# Patient Record
Sex: FEMALE | Race: WHITE | NOT HISPANIC OR LATINO | Employment: FULL TIME | URBAN - NONMETROPOLITAN AREA
[De-identification: names, ages, dates, MRNs, and addresses within clinical notes are randomized per-mention and may not be internally consistent; named-entity substitution may affect disease eponyms.]

---

## 2018-07-30 ENCOUNTER — OFFICE VISIT CONVERTED (OUTPATIENT)
Dept: FAMILY MEDICINE CLINIC | Age: 38
End: 2018-07-30
Attending: NURSE PRACTITIONER

## 2018-08-10 ENCOUNTER — OFFICE VISIT CONVERTED (OUTPATIENT)
Dept: FAMILY MEDICINE CLINIC | Age: 38
End: 2018-08-10
Attending: NURSE PRACTITIONER

## 2018-09-26 ENCOUNTER — OFFICE VISIT CONVERTED (OUTPATIENT)
Dept: FAMILY MEDICINE CLINIC | Age: 38
End: 2018-09-26
Attending: FAMILY MEDICINE

## 2018-09-26 ENCOUNTER — OFFICE VISIT CONVERTED (OUTPATIENT)
Dept: OTOLARYNGOLOGY | Facility: CLINIC | Age: 38
End: 2018-09-26
Attending: OTOLARYNGOLOGY

## 2018-09-26 ENCOUNTER — CONVERSION ENCOUNTER (OUTPATIENT)
Dept: OTOLARYNGOLOGY | Facility: CLINIC | Age: 38
End: 2018-09-26

## 2018-10-09 ENCOUNTER — CONVERSION ENCOUNTER (OUTPATIENT)
Dept: OTHER | Facility: HOSPITAL | Age: 38
End: 2018-10-09

## 2019-02-12 ENCOUNTER — HOSPITAL ENCOUNTER (OUTPATIENT)
Dept: OTHER | Facility: HOSPITAL | Age: 39
Discharge: HOME | End: 2019-02-12

## 2019-02-12 ENCOUNTER — OFFICE VISIT CONVERTED (OUTPATIENT)
Dept: FAMILY MEDICINE CLINIC | Age: 39
End: 2019-02-12
Attending: NURSE PRACTITIONER

## 2019-02-12 LAB
ALBUMIN SERPL-MCNC: 4 G/DL (ref 3.5–5)
ALBUMIN/GLOB SERPL: 1.3 {RATIO} (ref 1.4–2.6)
ALP SERPL-CCNC: 64 U/L (ref 42–98)
ALT SERPL-CCNC: 26 U/L (ref 10–40)
ANION GAP SERPL CALC-SCNC: 13 MMOL/L (ref 8–19)
AST SERPL-CCNC: 17 U/L (ref 15–50)
BASOPHILS # BLD MANUAL: 0.05 10*3/UL (ref 0–0.2)
BASOPHILS NFR BLD MANUAL: 0.5 % (ref 0–3)
BILIRUB SERPL-MCNC: 0.27 MG/DL (ref 0.2–1.3)
BUN SERPL-MCNC: 8 MG/DL (ref 5–25)
BUN/CREAT SERPL: 10 {RATIO} (ref 6–20)
CALCIUM SERPL-MCNC: 8.9 MG/DL (ref 8.7–10.4)
CHLORIDE SERPL-SCNC: 105 MMOL/L (ref 99–111)
CHOLEST SERPL-MCNC: 242 MG/DL (ref 107–200)
CHOLEST/HDLC SERPL: 5.9 {RATIO} (ref 3–6)
CONV CO2: 25 MMOL/L (ref 22–32)
CONV TOTAL PROTEIN: 7 G/DL (ref 6.3–8.2)
CREAT UR-MCNC: 0.83 MG/DL (ref 0.5–0.9)
DEPRECATED RDW RBC AUTO: 43.7 FL
EOSINOPHIL # BLD MANUAL: 0.12 10*3/UL (ref 0–0.7)
EOSINOPHIL NFR BLD MANUAL: 1.1 % (ref 0–7)
ERYTHROCYTE [DISTWIDTH] IN BLOOD BY AUTOMATED COUNT: 12.8 % (ref 11.5–14.5)
GFR SERPLBLD BASED ON 1.73 SQ M-ARVRAT: >60 ML/MIN/{1.73_M2}
GLOBULIN UR ELPH-MCNC: 3 G/DL (ref 2–3.5)
GLUCOSE SERPL-MCNC: 87 MG/DL (ref 65–99)
GRANS (ABSOLUTE): 6.78 10*3/UL (ref 2–8)
GRANS: 61.3 % (ref 30–85)
HBA1C MFR BLD: 13.5 G/DL (ref 12–16)
HCT VFR BLD AUTO: 40.5 % (ref 37–47)
HDLC SERPL-MCNC: 41 MG/DL (ref 40–60)
IMM GRANULOCYTES # BLD: 0.02 10*3/UL (ref 0–0.54)
IMM GRANULOCYTES NFR BLD: 0.2 % (ref 0–0.43)
LDLC SERPL CALC-MCNC: 165 MG/DL (ref 70–100)
LYMPHOCYTES # BLD MANUAL: 3.53 10*3/UL (ref 1–5)
LYMPHOCYTES NFR BLD MANUAL: 5 % (ref 3–10)
MCH RBC QN AUTO: 30.6 PG (ref 27–31)
MCHC RBC AUTO-ENTMCNC: 33.3 G/DL (ref 33–37)
MCV RBC AUTO: 91.8 FL (ref 81–99)
MONOCYTES # BLD AUTO: 0.55 10*3/UL (ref 0.2–1.2)
OSMOLALITY SERPL CALC.SUM OF ELEC: 286 MOSM/KG (ref 273–304)
PLATELET # BLD AUTO: 386 10*3/UL (ref 130–400)
PMV BLD AUTO: 8.7 FL (ref 7.4–10.4)
POTASSIUM SERPL-SCNC: 4.2 MMOL/L (ref 3.5–5.3)
RBC # BLD AUTO: 4.41 10*6/UL (ref 4.2–5.4)
SODIUM SERPL-SCNC: 139 MMOL/L (ref 135–147)
TRIGL SERPL-MCNC: 181 MG/DL (ref 40–150)
TSH SERPL-ACNC: 1.62 M[IU]/L (ref 0.27–4.2)
VARIANT LYMPHS NFR BLD MANUAL: 31.9 % (ref 20–45)
VLDLC SERPL-MCNC: 36 MG/DL (ref 5–37)
WBC # BLD AUTO: 11.05 10*3/UL (ref 4.8–10.8)

## 2019-02-13 LAB
ASO AB SERPL-ACNC: 59 [IU]/ML (ref 0–200)
CONV ANTI NUCLEAR ANTIBODY WITH REFLEX: NEGATIVE
CONV RHEUMATOID FACTOR IGM: <10 [IU]/ML (ref 0–14)
CRP SERPL-MCNC: 8.9 MG/L (ref 0–5)
ERYTHROCYTE [SEDIMENTATION RATE] IN BLOOD: 19 MM/H (ref 0–20)
PHOSPHATE SERPL-MCNC: 3.1 MG/DL (ref 2.4–4.5)
URATE SERPL-MCNC: 5.9 MG/DL (ref 2.5–7.5)

## 2019-03-19 ENCOUNTER — HOSPITAL ENCOUNTER (OUTPATIENT)
Dept: OTHER | Facility: HOSPITAL | Age: 39
Discharge: HOME | End: 2019-03-19
Attending: FAMILY MEDICINE

## 2019-03-19 ENCOUNTER — OFFICE VISIT CONVERTED (OUTPATIENT)
Dept: FAMILY MEDICINE CLINIC | Age: 39
End: 2019-03-19
Attending: FAMILY MEDICINE

## 2019-03-21 LAB — BACTERIA SPEC AEROBE CULT: NORMAL

## 2019-08-01 ENCOUNTER — OFFICE VISIT CONVERTED (OUTPATIENT)
Dept: FAMILY MEDICINE CLINIC | Age: 39
End: 2019-08-01
Attending: NURSE PRACTITIONER

## 2019-08-01 ENCOUNTER — HOSPITAL ENCOUNTER (OUTPATIENT)
Dept: OTHER | Facility: HOSPITAL | Age: 39
Discharge: HOME | End: 2019-08-01

## 2019-08-15 ENCOUNTER — HOSPITAL ENCOUNTER (OUTPATIENT)
Dept: OTHER | Facility: HOSPITAL | Age: 39
Discharge: HOME | End: 2019-08-15

## 2019-08-15 ENCOUNTER — OFFICE VISIT CONVERTED (OUTPATIENT)
Dept: FAMILY MEDICINE CLINIC | Age: 39
End: 2019-08-15
Attending: NURSE PRACTITIONER

## 2019-08-15 LAB
ALBUMIN SERPL-MCNC: 4.3 G/DL (ref 3.5–5)
ALBUMIN/GLOB SERPL: 1.4 {RATIO} (ref 1.4–2.6)
ALP SERPL-CCNC: 72 U/L (ref 42–98)
ALT SERPL-CCNC: 45 U/L (ref 10–40)
ANION GAP SERPL CALC-SCNC: 18 MMOL/L (ref 8–19)
AST SERPL-CCNC: 24 U/L (ref 15–50)
BASOPHILS # BLD AUTO: 0.07 10*3/UL (ref 0–0.2)
BASOPHILS # BLD: 0 % (ref 0–3)
BASOPHILS NFR BLD AUTO: 0.7 % (ref 0–3)
BILIRUB SERPL-MCNC: 0.33 MG/DL (ref 0.2–1.3)
BUN SERPL-MCNC: 12 MG/DL (ref 5–25)
BUN/CREAT SERPL: 16 {RATIO} (ref 6–20)
CALCIUM SERPL-MCNC: 9.2 MG/DL (ref 8.7–10.4)
CHLORIDE SERPL-SCNC: 105 MMOL/L (ref 99–111)
CHOLEST SERPL-MCNC: 256 MG/DL (ref 107–200)
CHOLEST/HDLC SERPL: 6.2 {RATIO} (ref 3–6)
CONV ABS BANDS: 0 % (ref 1–5)
CONV ABS IMM GRAN: 0.03 10*3/UL (ref 0–0.2)
CONV ATYPICAL LYMPHOCYTES: 19 % (ref 0–5)
CONV CO2: 20 MMOL/L (ref 22–32)
CONV IMMATURE GRAN: 0.3 % (ref 0–1.8)
CONV SEGMENTED NEUTROPHILS: 53 % (ref 45–70)
CONV TOTAL PROTEIN: 7.3 G/DL (ref 6.3–8.2)
CREAT UR-MCNC: 0.75 MG/DL (ref 0.5–0.9)
DEPRECATED RDW RBC AUTO: 44.3 FL (ref 36.4–46.3)
EOSINOPHIL # BLD AUTO: 0.13 10*3/UL (ref 0–0.7)
EOSINOPHIL # BLD AUTO: 1.2 % (ref 0–7)
EOSINOPHIL NFR BLD AUTO: 1 % (ref 0–7)
ERYTHROCYTE [DISTWIDTH] IN BLOOD BY AUTOMATED COUNT: 13 % (ref 11.7–14.4)
GFR SERPLBLD BASED ON 1.73 SQ M-ARVRAT: >60 ML/MIN/{1.73_M2}
GLOBULIN UR ELPH-MCNC: 3 G/DL (ref 2–3.5)
GLUCOSE SERPL-MCNC: 90 MG/DL (ref 65–99)
HCT VFR BLD AUTO: 39 % (ref 37–47)
HDLC SERPL-MCNC: 41 MG/DL (ref 40–60)
HGB BLD-MCNC: 13.1 G/DL (ref 12–16)
LDLC SERPL CALC-MCNC: 177 MG/DL (ref 70–100)
LYMPHOCYTES # BLD AUTO: 4.07 10*3/UL (ref 1–5)
LYMPHOCYTES NFR BLD AUTO: 37.9 % (ref 20–45)
MCH RBC QN AUTO: 31.2 PG (ref 27–31)
MCHC RBC AUTO-ENTMCNC: 33.6 G/DL (ref 33–37)
MCV RBC AUTO: 92.9 FL (ref 81–99)
MONOCYTES # BLD AUTO: 0.58 10*3/UL (ref 0.2–1.2)
MONOCYTES NFR BLD AUTO: 5.4 % (ref 3–10)
MONOCYTES NFR BLD MANUAL: 7 % (ref 3–10)
NEUTROPHILS # BLD AUTO: 5.86 10*3/UL (ref 2–8)
NEUTROPHILS NFR BLD AUTO: 54.5 % (ref 30–85)
NRBC CBCN: 0 % (ref 0–0.7)
NUC CELL # PRT MANUAL: 0 /100{WBCS}
OSMOLALITY SERPL CALC.SUM OF ELEC: 287 MOSM/KG (ref 273–304)
PLAT MORPH BLD: NORMAL
PLATELET # BLD AUTO: 364 10*3/UL (ref 130–400)
PMV BLD AUTO: 9.2 FL (ref 9.4–12.3)
POTASSIUM SERPL-SCNC: 4 MMOL/L (ref 3.5–5.3)
RBC # BLD AUTO: 4.2 10*6/UL (ref 4.2–5.4)
SMALL PLATELETS BLD QL SMEAR: ADEQUATE
SODIUM SERPL-SCNC: 139 MMOL/L (ref 135–147)
TRIGL SERPL-MCNC: 192 MG/DL (ref 40–150)
TSH SERPL-ACNC: 1.37 M[IU]/L (ref 0.27–4.2)
VARIANT LYMPHS NFR BLD MANUAL: 20 % (ref 20–45)
VLDLC SERPL-MCNC: 38 MG/DL (ref 5–37)
WBC # BLD AUTO: 10.74 10*3/UL (ref 4.8–10.8)

## 2019-12-02 ENCOUNTER — HOSPITAL ENCOUNTER (OUTPATIENT)
Dept: OTHER | Facility: HOSPITAL | Age: 39
Discharge: HOME | End: 2019-12-02
Attending: FAMILY MEDICINE

## 2019-12-02 ENCOUNTER — OFFICE VISIT CONVERTED (OUTPATIENT)
Dept: FAMILY MEDICINE CLINIC | Age: 39
End: 2019-12-02
Attending: FAMILY MEDICINE

## 2019-12-04 LAB — BACTERIA SPEC AEROBE CULT: NORMAL

## 2019-12-17 ENCOUNTER — OFFICE VISIT CONVERTED (OUTPATIENT)
Dept: FAMILY MEDICINE CLINIC | Age: 39
End: 2019-12-17
Attending: NURSE PRACTITIONER

## 2020-01-02 ENCOUNTER — HOSPITAL ENCOUNTER (OUTPATIENT)
Dept: OTHER | Facility: HOSPITAL | Age: 40
Discharge: HOME | End: 2020-01-02
Attending: FAMILY MEDICINE

## 2020-01-02 LAB
ALT SERPL-CCNC: 36 U/L (ref 10–40)
CHOLEST SERPL-MCNC: 265 MG/DL (ref 107–200)
CHOLEST/HDLC SERPL: 5.8 {RATIO} (ref 3–6)
HDLC SERPL-MCNC: 46 MG/DL (ref 40–60)
LDLC SERPL CALC-MCNC: 177 MG/DL (ref 70–100)
TRIGL SERPL-MCNC: 208 MG/DL (ref 40–150)
VLDLC SERPL-MCNC: 42 MG/DL (ref 5–37)

## 2020-01-03 LAB — TSH SERPL-ACNC: 2.67 M[IU]/L (ref 0.27–4.2)

## 2020-01-15 ENCOUNTER — HOSPITAL ENCOUNTER (OUTPATIENT)
Dept: OTHER | Facility: HOSPITAL | Age: 40
Discharge: HOME | End: 2020-01-15
Attending: NURSE PRACTITIONER

## 2020-01-20 ENCOUNTER — HOSPITAL ENCOUNTER (OUTPATIENT)
Dept: OTHER | Facility: HOSPITAL | Age: 40
Discharge: HOME | End: 2020-01-20
Attending: NURSE PRACTITIONER

## 2020-03-09 ENCOUNTER — HOSPITAL ENCOUNTER (OUTPATIENT)
Dept: OTHER | Facility: HOSPITAL | Age: 40
Discharge: HOME | End: 2020-03-09
Attending: NURSE PRACTITIONER

## 2020-03-09 ENCOUNTER — OFFICE VISIT CONVERTED (OUTPATIENT)
Dept: FAMILY MEDICINE CLINIC | Age: 40
End: 2020-03-09
Attending: NURSE PRACTITIONER

## 2020-03-11 LAB — BACTERIA SPEC AEROBE CULT: NORMAL

## 2020-03-31 ENCOUNTER — OFFICE VISIT CONVERTED (OUTPATIENT)
Dept: FAMILY MEDICINE CLINIC | Age: 40
End: 2020-03-31
Attending: NURSE PRACTITIONER

## 2020-07-20 ENCOUNTER — HOSPITAL ENCOUNTER (OUTPATIENT)
Dept: OTHER | Facility: HOSPITAL | Age: 40
Discharge: HOME | End: 2020-07-20
Attending: NURSE PRACTITIONER

## 2020-07-21 LAB
CHOLEST SERPL-MCNC: 164 MG/DL (ref 107–200)
CHOLEST/HDLC SERPL: 5 {RATIO} (ref 3–6)
HDLC SERPL-MCNC: 33 MG/DL (ref 40–60)
LDLC SERPL CALC-MCNC: 105 MG/DL (ref 70–100)
TRIGL SERPL-MCNC: 129 MG/DL (ref 40–150)
VLDLC SERPL-MCNC: 26 MG/DL (ref 5–37)

## 2020-08-12 ENCOUNTER — HOSPITAL ENCOUNTER (OUTPATIENT)
Dept: OTHER | Facility: HOSPITAL | Age: 40
Discharge: HOME | End: 2020-08-12
Attending: NURSE PRACTITIONER

## 2020-08-12 ENCOUNTER — OFFICE VISIT CONVERTED (OUTPATIENT)
Dept: FAMILY MEDICINE CLINIC | Age: 40
End: 2020-08-12
Attending: NURSE PRACTITIONER

## 2020-08-13 ENCOUNTER — HOSPITAL ENCOUNTER (OUTPATIENT)
Dept: OTHER | Facility: HOSPITAL | Age: 40
Discharge: HOME | End: 2020-08-13
Attending: NURSE PRACTITIONER

## 2020-08-14 LAB
BACTERIA SPEC AEROBE CULT: NORMAL
SARS-COV-2 RNA SPEC QL NAA+PROBE: NOT DETECTED

## 2020-10-14 ENCOUNTER — OFFICE VISIT CONVERTED (OUTPATIENT)
Dept: FAMILY MEDICINE CLINIC | Age: 40
End: 2020-10-14
Attending: NURSE PRACTITIONER

## 2020-10-19 ENCOUNTER — OFFICE VISIT CONVERTED (OUTPATIENT)
Dept: FAMILY MEDICINE CLINIC | Age: 40
End: 2020-10-19
Attending: NURSE PRACTITIONER

## 2020-10-28 ENCOUNTER — HOSPITAL ENCOUNTER (OUTPATIENT)
Dept: OTHER | Facility: HOSPITAL | Age: 40
Discharge: HOME | End: 2020-10-28
Attending: NURSE PRACTITIONER

## 2020-10-28 ENCOUNTER — OFFICE VISIT CONVERTED (OUTPATIENT)
Dept: FAMILY MEDICINE CLINIC | Age: 40
End: 2020-10-28
Attending: NURSE PRACTITIONER

## 2020-10-28 LAB
25(OH)D3 SERPL-MCNC: 18.9 NG/ML (ref 30–100)
ALBUMIN SERPL-MCNC: 4.3 G/DL (ref 3.5–5)
ALBUMIN/GLOB SERPL: 1.3 {RATIO} (ref 1.4–2.6)
ALP SERPL-CCNC: 82 U/L (ref 42–98)
ALT SERPL-CCNC: 18 U/L (ref 10–40)
ANION GAP SERPL CALC-SCNC: 16 MMOL/L (ref 8–19)
AST SERPL-CCNC: 12 U/L (ref 15–50)
BILIRUB SERPL-MCNC: 0.29 MG/DL (ref 0.2–1.3)
BUN SERPL-MCNC: 14 MG/DL (ref 5–25)
BUN/CREAT SERPL: 15 {RATIO} (ref 6–20)
CALCIUM SERPL-MCNC: 9.8 MG/DL (ref 8.7–10.4)
CHLORIDE SERPL-SCNC: 102 MMOL/L (ref 99–111)
CHOLEST SERPL-MCNC: 163 MG/DL (ref 107–200)
CHOLEST/HDLC SERPL: 3.8 {RATIO} (ref 3–6)
CONV CO2: 24 MMOL/L (ref 22–32)
CONV TOTAL PROTEIN: 7.6 G/DL (ref 6.3–8.2)
CREAT UR-MCNC: 0.91 MG/DL (ref 0.5–0.9)
ERYTHROCYTE [DISTWIDTH] IN BLOOD BY AUTOMATED COUNT: 13.5 % (ref 11.7–14.4)
GFR SERPLBLD BASED ON 1.73 SQ M-ARVRAT: >60 ML/MIN/{1.73_M2}
GLOBULIN UR ELPH-MCNC: 3.3 G/DL (ref 2–3.5)
GLUCOSE SERPL-MCNC: 103 MG/DL (ref 65–99)
HCT VFR BLD AUTO: 43.2 % (ref 37–47)
HDLC SERPL-MCNC: 43 MG/DL (ref 40–60)
HGB BLD-MCNC: 14.3 G/DL (ref 12–16)
LDLC SERPL CALC-MCNC: 95 MG/DL (ref 70–100)
MCH RBC QN AUTO: 30.9 PG (ref 27–31)
MCHC RBC AUTO-ENTMCNC: 33.1 G/DL (ref 33–37)
MCV RBC AUTO: 93.3 FL (ref 81–99)
OSMOLALITY SERPL CALC.SUM OF ELEC: 285 MOSM/KG (ref 273–304)
PLATELET # BLD AUTO: 394 10*3/UL (ref 130–400)
PMV BLD AUTO: 9.1 FL (ref 9.4–12.3)
POTASSIUM SERPL-SCNC: 5 MMOL/L (ref 3.5–5.3)
RBC # BLD AUTO: 4.63 10*6/UL (ref 4.2–5.4)
SODIUM SERPL-SCNC: 137 MMOL/L (ref 135–147)
TRIGL SERPL-MCNC: 127 MG/DL (ref 40–150)
TSH SERPL-ACNC: 1.33 M[IU]/L (ref 0.27–4.2)
VLDLC SERPL-MCNC: 25 MG/DL (ref 5–37)
WBC # BLD AUTO: 10.43 10*3/UL (ref 4.8–10.8)

## 2020-11-25 ENCOUNTER — HOSPITAL ENCOUNTER (OUTPATIENT)
Dept: OTHER | Facility: HOSPITAL | Age: 40
Discharge: HOME | End: 2020-11-25
Attending: NURSE PRACTITIONER

## 2020-12-08 ENCOUNTER — HOSPITAL ENCOUNTER (OUTPATIENT)
Dept: PHYSICAL THERAPY | Facility: CLINIC | Age: 40
Setting detail: RECURRING SERIES
Discharge: HOME | End: 2021-01-07
Attending: NURSE PRACTITIONER

## 2020-12-09 ENCOUNTER — OFFICE VISIT CONVERTED (OUTPATIENT)
Dept: FAMILY MEDICINE CLINIC | Age: 40
End: 2020-12-09
Attending: NURSE PRACTITIONER

## 2021-01-04 ENCOUNTER — OFFICE VISIT CONVERTED (OUTPATIENT)
Dept: FAMILY MEDICINE CLINIC | Age: 41
End: 2021-01-04
Attending: NURSE PRACTITIONER

## 2021-01-05 ENCOUNTER — OFFICE VISIT CONVERTED (OUTPATIENT)
Dept: FAMILY MEDICINE CLINIC | Age: 41
End: 2021-01-05
Attending: NURSE PRACTITIONER

## 2021-01-30 ENCOUNTER — HOSPITAL ENCOUNTER (OUTPATIENT)
Dept: OTHER | Facility: HOSPITAL | Age: 41
Discharge: HOME | End: 2021-01-30
Attending: FAMILY MEDICINE

## 2021-02-03 LAB — SARS-COV-2 RNA SPEC QL NAA+PROBE: NOT DETECTED

## 2021-02-17 ENCOUNTER — OFFICE VISIT CONVERTED (OUTPATIENT)
Dept: FAMILY MEDICINE CLINIC | Age: 41
End: 2021-02-17
Attending: NURSE PRACTITIONER

## 2021-02-18 ENCOUNTER — OFFICE VISIT CONVERTED (OUTPATIENT)
Dept: FAMILY MEDICINE CLINIC | Age: 41
End: 2021-02-18
Attending: FAMILY MEDICINE

## 2021-03-29 ENCOUNTER — OFFICE VISIT CONVERTED (OUTPATIENT)
Dept: CARDIOLOGY | Facility: CLINIC | Age: 41
End: 2021-03-29
Attending: INTERNAL MEDICINE

## 2021-04-14 ENCOUNTER — CONVERSION ENCOUNTER (OUTPATIENT)
Dept: CARDIOLOGY | Facility: CLINIC | Age: 41
End: 2021-04-14
Attending: INTERNAL MEDICINE

## 2021-04-21 ENCOUNTER — OFFICE VISIT CONVERTED (OUTPATIENT)
Dept: FAMILY MEDICINE CLINIC | Age: 41
End: 2021-04-21
Attending: NURSE PRACTITIONER

## 2021-05-10 NOTE — H&P
History and Physical      Patient Name: Aurora Mendez   Patient ID: [id number]   Sex: Female   YOB: 1980    Primary Care Provider: Gabbie ARCEO   Referring Provider: Gabbie ARCEO    Visit Date: March 29, 2021    Provider: Nader Duran MD   Location: [facility]   Location Address: [address] [place] [address], [address]   [place]  [id number]   Location Phone: [phone]          Chief Complaint     Chest pain and shortness of breath.       History Of Present Illness  Consult requested by: Gabbie ARCEO   Aurora Mendez is a 40 year old /White female with morbid obesity, polycystic ovarian disease, and manic depressive disorder. She was referred for cardiac evaluation because of chest pain and shortness of breath. The symptoms are present for the past several months. It is described as a sharp and intermittent pain on the left side of the chest, which is present both at rest and sometimes on activities. She also has shortness of breath on activities along with heaviness. The shortness of breath also happens while she is lying down in the bed at night. She denies any palpitations. No dizziness. No syncopal episodes. No pedal edema. She had a visit to the emergency room on 02/18/2021 for chest pain. Acute coronary syndrome was ruled out and she was discharged home. She has no history of any previous cardiac illness and denies any recent cardiac testing as well.   PAST MEDICAL HISTORY: 1) Manic depressive disorder. 2) Polycystic ovarian disease. 3) Hyperlipidemia. 4) Hypothyroidism. 5) Negative for diabetes mellitus or coronary artery disease.   PSYCHOSOCIAL HISTORY: Currently smoker who smokes 1/2 pack of cigarettes per day. She drinks alcohol rarely. No recreational drug usage. She reports some mood changes and depression.   FAMILY HISTORY: Positive for diabetes and hypertension. No family history of premature coronary artery disease.   CURRENT MEDICATIONS:  "Metformin 400 mg b.i.d.; buspirone 10 mg b.i.d.; Wellbutrin 450 mg daily; atorvastatin 40 mg daily; Synthroid 125 mcg daily; Lamictal 100 mg daily; norethindrone 35 mg daily; Seroquel 25 mg p.r.n.; vitamin D 1000 units daily.   ALLERGIES: No known drug allergies.       Review of Systems  · Constitutional  o Admits  o : fatigue  o Denies  o : recent weight changes   · Eyes  o Admits  o : double vision  · HENT  o Denies  o : hearing loss or ringing, chronic sinus problem, swollen glands in neck  · Cardiovascular  o Admits  o : chest pain, palpitations (fast, fluttering, or skipping beats), swelling (feet, ankles, hands), shortness of breath while walking or lying flat  · Respiratory  o Denies  o : chronic or frequent cough, asthma or wheezing, COPD  · Gastrointestinal  o Denies  o : ulcers, nausea or vomiting  · Neurologic  o Admits  o : lightheaded or dizzy, headaches  o Denies  o : stroke  · Musculoskeletal  o Admits  o : joint pain, back pain  · Endocrine  o Admits  o : excessive thirst or urination  o Denies  o : thyroid disease, diabetes, heat or cold intolerance  · Heme-Lymph  o Denies  o : bleeding or bruising tendency, anemia      Vitals  Date Time BP Position Site L\R Cuff Size HR RR TEMP (F) WT  HT  BMI kg/m2 BSA m2 O2 Sat FR L/min FiO2 HC       03/29/2021 08:33 /82 Sitting    82 - R   259lbs 0oz 5'  7\" 40.56 2.36             Physical Examination  · Constitutional  o Appearance  o : Awake, alert, in no acute distress.  · Head and Face  o HEENT  o : No pallor, anicteric. Eyes normal. Moist mucous membranes.  · Neck  o Inspection/Palpation  o : Supple.   o Jugular Veins  o : No JVD. No carotid bruits.  · Respiratory  o Auscultation of Lungs  o : Clear to auscultation bilaterally. No crackles or wheezing.  · Cardiovascular  o Heart  o : S1, S2 is normally heard. No S3. No murmur, rubs, or gallops.  · Gastrointestinal  o Abdominal Examination  o : Soft, non-distended. No palpable hepatosplenomegaly. Bowel " sounds heard in all four quadrants.  · Musculoskeletal  o General  o : Normal muscle tone and strength.  · Skin and Subcutaneous Tissue  o General Inspection  o : No skin rashes.  · Extremities  o Extremities  o : Warm and well perfused. Distal pulses present. No pitting pedal edema.  · EKG  o EKG  o : EKG done at [facility] emergency room on 02/18/2021 showed sinus rhythm, borderline right axis deviation, nonspecific T-wave changes in multiple leads, abnormal EKG.   · Labs  o Labs  o : Done on 02/18/2021: Sodium 135, potassium 3.8, chloride 100, BUN 12, creatinine 0.74, magnesium 1.78, AST 16, ALT 26, total CK 89, total protein 6.9. On 10/28/2020 triglycerides were 127, total cholesterol 163, LDL 95, HDL 43.           Assessment     ASSESSMENT & PLAN:    1.  Chest pain/shortness of breath.  Symptoms have both typical and atypical features for angina.  She has        multiple risk factors for coronary artery disease including hyperlipidemia and tobacco use.  EKG showed        nonsignificant T-wave abnormalities in multiple leads.  Ischemia needs to be ruled out.  We will proceed        with an echocardiogram to assess the LV systolic and diastolic function and to rule out significant valvular        abnormalities.  The patient will also need a SPECT stress test to rule out reversible ischemia.  SPECT is        needed since baseline EKG is abnormal.  Moreover, the patient is unable to exercise in a satisfactory        manner to reach the goal heart rate.   2.  Follow up with the echocardiogram and SPECT reports.     Nader Duran MD   JV/rt                Electronically Signed by: Jerrica Montero-, Other -Author on April 10, 2021 09:35:22 AM  Electronically Co-signed by: Nader Duran MD -Reviewer on April 11, 2021 06:09:56 PM

## 2021-05-11 NOTE — PROCEDURES
"   Procedure Note      Patient Name: Aurora Mendez   Patient ID: [id number]   Sex: Female   YOB: 1980    Primary Care Provider: Gabbie ARCEO   Referring Provider: Gabbie ARCEO    Visit Date: April 14, 2021    Provider: Nader Duran MD   Location[facility]   Location Address: [address] [place] [address]  [address]  [place], [place]  [id number]   Location Phone: [phone]          FINAL REPORT   TRANSTHORACIC ECHOCARDIOGRAM REPORT    Diagnosis: Chest pain, shortness of breath   Height: 5'7\" Weight: 259 B/P: 138/82 BSA: 2.26   Tech: JLW   MEASUREMENTS:  RVID (Diastole) : RVID. (NORMAL: 0.7 to 2.4 cm max)   LVID (Systole): 2.7 cm (Diastole): 4.1 cm . (NORMAL: 3.7 - 5.4 cm)   Posterior Wall Thickness (Diastole): 1 cm. (NORMAL: 0.8 - 1.1 cm)   Septal Thickness (Diastole): 1 cm. (NORMAL: 0.7 - 1.2 cm)   LAID (Systole): 3.1 cm. (NORMAL: 1.9 - 3.8 cm)   Aortic Root Diameter (Diastole): 3.2 cm. (NORMAL: 2.0 - 3.7 cm)   COMMENTS:  The patient underwent 2-D, M-Mode, and Doppler examination, including pulse-wave, continuous-wave, and color-flow analysis; the study is technically adequate.   FINDINGS:  MITRAL VALVE: Normal in appearance, opens well. No evidence of mitral valve prolapse. No mitral stenosis. Trace mitral regurgitation.   AORTIC VALVE: Normal trileaflet aortic valve, opens well. No evidence of aortic stenosis or regurgitation on Doppler examination.   TRICUSPID VALVE: Normal in appearance, opens well. Trace tricuspid regurgitation. Normal pulmonary artery systolic pressure.   PULMONIC VALVE: Grossly normal.   AORTIC ROOT: Normal in size with adequate motion.   LEFT ATRIUM: Normal in size. No intracavitary masses or clots seen. LA volume index is 28 mL/m2.   LEFT VENTRICLE: The left ventricular chamber size is normal. The left ventricular wall thickness is normal. Left ventricular systolic function is normal. Estimated LV ejection fraction is 55-60%. There are no " regional wall motion abnormalities.   RIGHT VENTRICLE: Normal size and function.   RIGHT ATRIUM: Normal in size.   PERICARDIUM: No evidence of pericardial effusion.   INFERIOR VENA CAVA: Measures 1.3 cm in diameter and there is more than 50% collapse during inspiration.   DOPPLER: E/A ratio is 1.4. DT= 202 msec. IVRT is 90 msec. E/E' is 11.   Faxed: 04/20/2021      CONCLUSION:  1.  Normal left ventricular systolic function with estimated LV ejection fraction of 55-60%.  2.  No hemodynamically significant valvular abnormalities.      Nader Duran MD  JV/pap                 Electronically Signed by: Abimbola Fisher-, Other -Author on April 20, 2021 03:35:31 PM  Electronically Co-signed by: Nader Duran MD -Reviewer on April 20, 2021 03:36:07 PM

## 2021-05-14 VITALS
HEIGHT: 67 IN | BODY MASS INDEX: 40.65 KG/M2 | HEART RATE: 82 BPM | WEIGHT: 259 LBS | DIASTOLIC BLOOD PRESSURE: 82 MMHG | SYSTOLIC BLOOD PRESSURE: 138 MMHG

## 2021-05-16 VITALS
OXYGEN SATURATION: 98 % | WEIGHT: 250 LBS | HEART RATE: 90 BPM | RESPIRATION RATE: 20 BRPM | BODY MASS INDEX: 39.24 KG/M2 | TEMPERATURE: 98.4 F | DIASTOLIC BLOOD PRESSURE: 78 MMHG | SYSTOLIC BLOOD PRESSURE: 131 MMHG | HEIGHT: 67 IN

## 2021-05-18 NOTE — PROGRESS NOTES
Aurora Mendez  1980     Office/Outpatient Visit    Visit Date: Tue, Jan 5, 2021 09:42 am    Provider: Gabbie Barnard N.P. (Assistant: Veena Osborne MA)    Location: [facility]        Electronically signed by Gabbie Barnard N.P. on  01/05/2021 10:02:02 AM                             Subjective:        CC: Ms. Mendez is a 40 year old White female.  note for work/fmla; pt states she hasnt started new dosage of metformin [phone] phone call         HPI:           Complaint of adjustment disorder with depressed mood..  The symptom began years ago.  Aurora is following up today due to her inability to return to work as planned from her FMLA. She reports that she did not get any sleep last night due to her anxiety and worry over her return to work She is planning on intermittent FMLA in the future should she need to have time off related to her appt/ anxiety but she feels like she is at the point now that she needs to remain continuous for 2 more weeks until we resolve her current sleep problems           Complaint of psychophysiologic insomnia..  Aurora was unable to sleep last night . She has been up since 6am yesterday and feels like it is due to her anxeity over returning to work She is not taking any medication at this point to help with her sleep nor has she in the past     ROS:     CONSTITUTIONAL:  Negative for chills, fatigue and fever.      CARDIOVASCULAR:  Negative for chest pain and pedal edema.      RESPIRATORY:  Negative for recent cough and dyspnea.      PSYCHIATRIC:  Positive for anxiety, crying spells, depression, feelings of stress, anhedonia, mood swings, difficulty concentrating, sadness, sleep disturbance and suicidal thoughts ( fleeting thoughts, no plan ).          Past Medical History / Family History / Social History:         Last Reviewed on 12/22/2019 10:57 PM by Gabbie Barnard    Past Medical History:             PREVENTIVE HEALTH MAINTENANCE              DENTAL CLEANING: was last done - [place]     EYE EXAM: was last done been some time     MAMMOGRAM: Done within last 2 years and results in are chart was last done 2020 with normal results     PAP SMEAR: hysterectomy         Surgical History:         Hysterectomy: stage III dysplasia;         Family History:         Positive for Coronary Artery Disease ( pat. GM ) and Myocardial Infarction ( mat. GF ).      Positive for Type 2 Diabetes ( pat. GM ).  Father:  at age 63; Cause of death was pancreatic cacner     Mother: Healthy     Son(s): Healthy; 2 son(s) total     Daughter(s): 1 daughter(s) total; 1 ;  pneumonia/ at 17         Social History:     Occupation: [employer] supervisor     Marital Status:      Children: 3 children         Tobacco/Alcohol/Supplements:     Last Reviewed on 2021 08:47 AM by Tori Pickard    Tobacco: Current Smoker: She currently smokes some days, 1/2 pack per day.      Caffeine:  She admits to consuming caffeine via coffee ( 2 servings per day ) and tea ( 2 servings per day ).          Substance Abuse History:     Last Reviewed on 2019 10:57 PM by Gabbie Barnard    NEGATIVE         Mental Health History:     Last Reviewed on 2019 10:57 PM by Gabbie Barnard        Generalized Anxiety Disorder    Posttraumatic Stress Disorder ( from loss of her daughter in  )     Major Depression         Communicable Diseases (eg STDs):     Last Reviewed on 2019 10:57 PM by Gabibe Barnard        Current Problems:     Last Reviewed on 2020 09:47 PM by Gabbie Barnard    Hypothyroidism, unspecified    Dysthymic disorder    Vitamin D deficiency, unspecified    Polycystic ovarian syndrome    Pure hyperglyceridemia    Anogenital (venereal) warts    Mixed hyperlipidemia    Nicotine dependence, unspecified, uncomplicated    Major depressive disorder, recurrent severe without psychotic features    Generalized anxiety disorder    Strain of  unspecified muscle, fascia and tendon at shoulder and upper arm level, left arm, initial encounter    Pain in right knee    Pain in left knee    Adjustment disorder with depressed mood        Immunizations:     Fluzone Quadrivalent (3+ years) 10/30/2017    PPD 1/30/2007        Allergies:     Last Reviewed on 1/05/2021 09:44 AM by Veena Osborne    Effexor: palpitations     Prozac:          Current Medications:     Last Reviewed on 1/05/2021 09:44 AM by Veena Osborne    TylenoL 325 mg oral tablet [take 1 - 2 tablets (325 - 650 mg) by oral route every 4-6 hours as needed]    buPROPion  mg oral Tablet, Extended Release 24 hr [take 1 tablet (450 mg) by oral route once daily swallowing whole. Do not crush, chew and/or divide.]    lamoTRIgine 100 mg oral tablet [take 1 tablet (100 mg) by oral route at night ]    Synthroid 125 mcg oral tablet [1 tab daily]    norethindrone (contraceptive) 0.35 mg oral tablet [Take 1 tablet(s) by mouth daily as directed.]    atorvastatin 40 mg oral tablet [take 1 tablet (40 mg) by oral route once daily @ HS]    busPIRone 10 mg oral tablet    metFORMIN 500 mg oral Tablet, Extended Release 24 hr [take 1 tablet (500 mg) by oral route twice daily]        Assessment:         F43.21   Adjustment disorder with depressed mood       F41.1   Generalized anxiety disorder       F51.04   Psychophysiologic insomnia           Plan:         Adjustment disorder with depressed mood        RECOMMENDATIONS given include: we will extend her FMLA through 2 more weeks.  She will follderic tavarez in office here next week after her [facility] visits.  Telehealth: Verbal consent obtained for visit to occur via phone call; Staff, other than provider, present during telephone visit include only patient and provider; Total time spent was 7 minutes; [id number]--Wswwxlbou E/M 5-10 minutes         Generalized anxiety disorder        RECOMMENDATIONS given include: follow up with Mohini as recommended on Friday.           Psychophysiologic insomnia        RECOMMENDATIONS given include: I have recommended that Aurora try OTC melatonin for now  start with 4mg  add 2mg after 1 hour if no improvement - Next night, start with 6 mg  up to 10mg max of OTC.  She will discuss this further with Mohini on Friday for insomnia intervention.              Patient Recommendations:        For  Adjustment disorder with depressed mood:    I also recommend we will extend her FMLA through 2 more weeks.  She will follderic tavarez in office here next week after her [employer] visits.          For  Generalized anxiety disorder:    I also recommend follow up with Mohini as recommended on Friday.          For  Psychophysiologic insomnia:    I also recommend I have recommended that Aurora try OTC melatonin for now  start with 4mg  add 2mg after 1 hour if no improvement - Next night, start with 6 mg  up to 10mg max of OTC.  She will discuss this further with Mohini on Friday for insomnia intervention.              Charge Capture:         Primary Diagnosis:     F43.21  Adjustment disorder with depressed mood           Orders:      [id number]  Phys/QHP telephone evaluation 5-10 min  (In-House)              F41.1  Generalized anxiety disorder     F51.04  Psychophysiologic insomnia

## 2021-05-18 NOTE — PROGRESS NOTES
Aurora MendezSue 1980     Office/Outpatient Visit    Visit Date:  01:46 pm    Provider: Gabbie Barnard N.P. (Assistant: Sabra Messina)    Location: [facility]        Electronically signed by Gabbie Barnard N.P. on  2018 11:38:52 AM                             SUBJECTIVE:        CC:     Ms. Mendez is a 37 year old White female.  presents today due to Nausea, sore throat         HPI:         With regard to the upper respiratory illness, these have been present for the past 3 days.  The symptoms include sore throat nausea without vomiting.  She denies fever, headache, nasal congestion or sputum production.  She denies exposure to ill contacts.  She has already tried to relieve the symptoms with nyquil.      ROS:     CONSTITUTIONAL:  Negative for chills, fatigue, fever, and weight change.      E/N/T:  Positive for nasal congestion and sore throat.      CARDIOVASCULAR:  Negative for chest pain, orthopnea, paroxysmal nocturnal dyspnea and pedal edema.      RESPIRATORY:  Negative for dyspnea.      GASTROINTESTINAL:  Positive for nausea.   Negative for abdominal pain or vomiting.      GENITOURINARY:  Positive for vaginal discharge (thin - fishy odor, history of BV).      ALLERGIC/IMMUNOLOGIC:  Positive for seasonal allergies.          PMH/FMH/SH:     Last Reviewed on 2017 04:04 PM by Abimbola Roman    Past Medical History:             PREVENTIVE HEALTH MAINTENANCE             PAP SMEAR: hysterectomy         Surgical History:         Hysterectomy: stage III dysplasia;         Family History:         Positive for Coronary Artery Disease ( pat. GM ) and Myocardial Infarction ( mat. GF ).      Positive for Type 2 Diabetes ( pat. GM ).  Father:  at age 63; Cause of death was pancreatic cacner     Mother: Healthy     Son(s): Healthy; 2 son(s) total     Daughter(s): 1 daughter(s) total; 1 ;  pneumonia/ at 17         Social History:     Occupation: [employer]  supervisor     Marital Status:      Children: 3 children         Tobacco/Alcohol/Supplements:     Last Reviewed on 7/30/2018 01:53 PM by Sabra Messina    Tobacco: Current Smoker: She currently smokes every day, 1 pack per day.      Caffeine:  She admits to consuming caffeine via coffee ( 2 servings per day ) and tea ( 2 servings per day ).          Substance Abuse History:     Last Reviewed on 12/11/2017 04:04 PM by Abimbola Roman    NEGATIVE         Mental Health History:     Last Reviewed on 12/11/2017 04:04 PM by Abimbola Roman        Communicable Diseases (eg STDs):     Last Reviewed on 12/11/2017 04:04 PM by Abimbola Roman            Current Problems:     Last Reviewed on 12/11/2017 04:04 PM by Abimbola Roman    Nasal Congestion     Leukocytosis, unspecified     Plantar wart     Acquired hypothyroidism, unspecified cause     Urinary frequency     Elevated fasting glucose     History of recurrent UTI's     GERD     Vitamin D deficiency     Ovarian cyst     High-risk sexual behavior     Obstructive sleep apnea (adult) (pediatric)     Depression with anxiety     Acquired hypothyroidism     Allergic rhinitis, unspecified cause     Nausea         Immunizations:     Fluzone Quadrivalent (3+ years) 10/30/2017     PPD 1/30/2007         Allergies:     Last Reviewed on 7/30/2018 01:46 PM by Sabra Messina    Effexor: palpitations    Prozac:        Current Medications:     Last Reviewed on 7/30/2018 01:52 PM by Sabra Messina    Norethindrone Acetate 0.35mg Tablet Take 1 tablet(s) by mouth daily as directed.     Metformin HCl 750mg Tablets, Extended Release 1 tab daily     Vitamin D3 2,000IU Capsules 1 capsule daily     Synthroid 0.125mg Tablet 1 tab daily     Metronidazole 500mg Tablet Take 1 tablet(s) by mouth bid for 7 days         OBJECTIVE:        Vitals:         Current: 7/30/2018 1:49:56 PM    Ht:  5 ft, 6.5 in;  Wt: 248.2 lbs;  BMI: 39.5    T: 97.3 F (oral);  BP: 115/78 mm Hg  (left arm, sitting);  P: 81 bpm (left arm (BP Cuff), sitting);  sCr: 0.76 mg/dL;  GFR: 126.15        Exams:     PHYSICAL EXAM:     GENERAL: vital signs recorded - well developed, well nourished;  no apparent distress;     E/N/T: EARS:  normal external auditory canals and tympanic membranes;  grossly normal hearing; OROPHARYNX: posterior pharynx shows 3+ tonsils, cobblestone appearance, and erythema;     NECK: range of motion is normal; thyroid is non-palpable;     RESPIRATORY: normal respiratory rate and pattern with no distress; normal breath sounds with no rales, rhonchi, wheezes or rubs;     CARDIOVASCULAR: normal rate; rhythm is regular;  no systolic murmur; no edema;     GASTROINTESTINAL: nontender; normal bowel sounds;     NEUROLOGICAL:  cranial nerves, motor and sensory function, reflexes, gait and coordination are all intact;     PSYCHIATRIC:  appropriate affect and demeanor; normal speech pattern; grossly normal memory;         ASSESSMENT:           477.9   J30.9  Allergic rhinitis, unspecified cause              DDx:     787.02   R11.0  Nausea              DDx:         ORDERS:         Meds Prescribed:       Zofran (Ondansetron HCl) 4mg Tablet 1 po q 6 hours prn  #20 (Twenty) tablet(s) Refills: 0                 PLAN:          Allergic rhinitis, unspecified cause         RECOMMENDATIONS given include: This appears to be nasal drainage - will recommend OTC allegra / zyrtec to help dry up drainage - humidifier at night  - If not better 2 weeks RTO or sooner if worsening or new symptoms.      FOLLOW-UP:.   Chronic visit follow up          Nausea           Prescriptions:       Zofran (Ondansetron HCl) 4mg Tablet 1 po q 6 hours prn  #20 (Twenty) tablet(s) Refills: 0             Patient Recommendations:        For  Allergic rhinitis, unspecified cause:     I also recommend This appears to be nasal drainage - will recommend OTC allegra / zyrtec to help dry up drainage - humidifier at night  - If not better 2 weeks  RTO or sooner if worsening or new symptoms.                  APPOINTMENT INFORMATION:        Monday Tuesday Wednesday Thursday Friday Saturday Sunday            Time:___________________AM  PM   Date:_____________________             CHARGE CAPTURE:           Primary Diagnosis:     477.9 Allergic rhinitis, unspecified cause            J30.9    Allergic rhinitis, unspecified              Orders:          68574   Office/outpatient visit; established patient, level 3  (In-House)           787.02 Nausea            R11.0    Nausea

## 2021-05-18 NOTE — PROGRESS NOTES
Lakeisha Mendez  1980     Office/Outpatient Visit    Visit Date: Wed, Oct 28, 2020 09:55 am    Provider: Gabbie Barnard N.P. (Assistant: Sury Trejo, )    Location: [facility]        Electronically signed by Gabbie Barnard N.P. on  10/28/2020 01:09:11 PM                             Subjective:        CC: Ms. Mendez is a 39 year old White female.  discuss FMLA paperwork;         HPI:           Complaint of major depressive disorder, recurrent severe without psychotic features..  lakeisha is here today for follow up on her anxiety disorder/ depression/ PTSD from the death of her daughter at a young age.  She has been referred to [facility] for counseling and medication management  She is here today needing FMLA to be able to get her mental health under better control and to protect her job.  She has , in the past, had breakdowns at work requiring her to leave.  She did see [facility] on 10/22 and is scheduled for a follow up iwnt the counselor again on 11/16  but is scheduled to see the NP(Moh[facility])  tomorrow for medication management  .  She reports that the current medication is not doing much for her symptoms.  she is trying to get out of bed daily however, Monday was a bad day and was not able to get up.            Complaint of strain of unspecified muscle, fascia and tendon at shoulder and upper arm level, left arm, initial encounter..  The symptom began several weeks ago.  Lakeisha reports that she has noticed a pain in her left upper arm/axillary area when she is applying pressure or weight to the arm.  She does not remember straining or injuring that area.  She has not done any repetative movements She has been trying to take tylenol but no relief     ROS:     CONSTITUTIONAL:  Positive for fatigue ( severe ).   Negative for chills or fever.      CARDIOVASCULAR:  Negative for chest pain and pedal edema.      RESPIRATORY:  Negative for recent cough and dyspnea.      MUSCULOSKELETAL:   Positive for arthralgias and (bialteral knees) myalgias (left axillary area when weight bearing to left arm).      NEUROLOGICAL:  Negative for paresthesias.      PSYCHIATRIC:  Positive for anxiety, depression, feelings of stress, anhedonia, difficulty concentrating, sadness and sleep disturbance.   Negative for suicidal thoughts.          Past Medical History / Family History / Social History:         Last Reviewed on 2019 10:57 PM by Gabbie Barnard    Past Medical History:             PREVENTIVE HEALTH MAINTENANCE             DENTAL CLEANING: was last done - [place]     EYE EXAM: was last done been some time     MAMMOGRAM: Done within last 2 years and results in are chart was last done 2020 with normal results     PAP SMEAR: hysterectomy         Surgical History:         Hysterectomy: stage III dysplasia;         Family History:         Positive for Coronary Artery Disease ( pat. GM ) and Myocardial Infarction ( mat. GF ).      Positive for Type 2 Diabetes ( pat. GM ).  Father:  at age 63; Cause of death was pancreatic cacner     Mother: Healthy     Son(s): Healthy; 2 son(s) total     Daughter(s): 1 daughter(s) total; 1 ;  pneumonia/ at 17         Social History:     Occupation: [employer] supervisor     Marital Status:      Children: 3 children         Tobacco/Alcohol/Supplements:     Last Reviewed on 10/19/2020 03:47 PM by Veena Osborne    Tobacco: Current Smoker: She currently smokes some days, 1/2 pack per day.      Caffeine:  She admits to consuming caffeine via coffee ( 2 servings per day ) and tea ( 2 servings per day ).          Substance Abuse History:     Last Reviewed on 2019 10:57 PM by Gabbie Barnard    NEGATIVE         Mental Health History:     Last Reviewed on 2019 10:57 PM by Gabbie Barnard        Communicable Diseases (eg STDs):     Last Reviewed on 2019 10:57 PM by Gabbie Barnard        Current Problems:     Last Reviewed on  3/31/2020 11:58 AM by Spurling, Sarah C    Hypothyroidism, unspecified    Dysthymic disorder    Vitamin D deficiency, unspecified    Polycystic ovarian syndrome    Pure hyperglyceridemia    Anogenital (venereal) warts    Mixed hyperlipidemia    Encounter for general adult medical examination without abnormal findings    Encounter for screening mammogram for malignant neoplasm of breast    Inconclusive mammogram    Streptococcal pharyngitis    Nicotine dependence, unspecified, uncomplicated    Encounter for screening for depression    Cough    Headache    Acute upper respiratory infection, unspecified    Encounter for screening for other viral diseases    Encounter for screening for cardiovascular disorders    Major depressive disorder, recurrent severe without psychotic features    Generalized anxiety disorder        Immunizations:     Fluzone Quadrivalent (3+ years) 10/30/2017    PPD 1/30/2007        Allergies:     Last Reviewed on 10/19/2020 03:47 PM by Veena Osborne    Effexor: palpitations     Prozac:          Current Medications:     Last Reviewed on 10/19/2020 03:44 PM by Veena Osborne    TylenoL 325 mg oral tablet [take 1 - 2 tablets (325 - 650 mg) by oral route every 4-6 hours as needed]    Birth control pill  [1 daily po]    Synthroid 125 mcg oral tablet [1 tab daily]    metFORMIN 750 mg oral Tablet, Extended Release 24 hr [1 tab daily]    norethindrone (contraceptive) 0.35 mg oral tablet [Take 1 tablet(s) by mouth daily as directed.]    buPROPion  mg oral Tablet, Extended Release 24 hr [take two tablets once daily ]    atorvastatin 40 mg oral tablet [take 1 tablet (40 mg) by oral route once daily @ HS]        Objective:        Vitals:         Current: 10/28/2020 10:00:41 AM    Ht:  5 ft, 6.5 in;  Wt: 248.4 lbs;  BMI: 39.5T: 97.7 F (temporal);  BP: 137/82 mm Hg (right arm, sitting);  P: 93 bpm (right arm (BP Cuff), sitting);  sCr: 0.75 mg/dL;  GFR: 125.43        Exams:     PHYSICAL EXAM:     GENERAL:  vital signs recorded - well developed, well nourished;  no apparent distress, tired-appearing, tearful;     RESPIRATORY: normal appearance and symmetric expansion of chest wall; normal respiratory rate and pattern with no distress; normal breath sounds with no rales, rhonchi, wheezes or rubs;     CARDIOVASCULAR: normal rate; rhythm is regular;  no edema;     LYMPHATIC: no enlargement of cervical or facial nodes; no supraclavicular nodes;     MUSCULOSKELETAL: normal gait; normal range of motion of all major muscle groups; pain with range of motion in: left shoulder;     NEUROLOGIC: mental status: alert and oriented x 3;     PSYCHIATRIC: affect/demeanor: depressed, tearful, flat;  normal speech pattern; normal thought and perception;         Assessment:         F33.2   Major depressive disorder, recurrent severe without psychotic features       F41.1   Generalized anxiety disorder       S46.912A   Strain of unspecified muscle, fascia and tendon at shoulder and upper arm level, left arm, initial encounter           ORDERS:         Meds Prescribed:       [New Rx] diclofenac sodium 75 mg oral tablet, delayed release (enteric coated) [take 1 tablet (75 mg) by oral route 2 times per day], #60 (sixty) tablets, Refills: 1 (one)                 Plan:         Major depressive disorder, recurrent severe without psychotic featuresI am going to give her continuous FMLA through December 14, 2020- this will allow her to be on medication for ample amount of time, as well as have a few sessions with the counselor.  I will have her follow up here in the office on 12/11  She will come in sooner should she not get in sooner with the counselor. She is on a wait list .  We did discuss at the time of return, we can do intermittent FMLA to allow her to continue with her appts and keep ongoing treatment I do feel, at minimum she should be in counseling 3 solid months with long term follow up after         Generalized anxiety disorderkeep her  appt with [facility] tomorrow and follow up as recommended with counselor        Strain of unspecified muscle, fascia and tendon at shoulder and upper arm level, left arm, initial encountersounds like a strain - if this is not improved after 2 weeks, she will need to follow up for further recommendations          Prescriptions:       [New Rx] diclofenac sodium 75 mg oral tablet, delayed release (enteric coated) [take 1 tablet (75 mg) by oral route 2 times per day], #60 (sixty) tablets, Refills: 1 (one)             Charge Capture:         Primary Diagnosis:     F33.2  Major depressive disorder, recurrent severe without psychotic features           Orders:      16501  Office/outpatient visit; established patient, level 4  (In-House)              F41.1  Generalized anxiety disorder     S46.912A  Strain of unspecified muscle, fascia and tendon at shoulder and upper arm level, left arm, initial encounter

## 2021-05-18 NOTE — PROGRESS NOTES
Aurora Mendez  1980     Office/Outpatient Visit    Visit Date: Wed, Oct 14, 2020 10:12 am    Provider: Gabbie Barnard N.P. (Assistant: Giselle Raman RN)    Location: [facility]        Electronically signed by Gabbie Barnard N.P. on  10/18/2020 11:42:55 PM                             Subjective:        CC: Ms. Mendez is a 39 year old White female.  medication refills (phone call- [phone]) *phone unable to support video);         HPI:           Ms. Mendez presents with hypothyroidism, unspecified.  This was first diagnosed several years ago.  She is currently taking Levothyroid, 125 mcg daily.  TSH was last checked more than 6 months ago.  The result was reported as normal.  She denies any related symptoms.            With regard to the dysthymic disorder, her anxiety disorder was originally diagnosed many years ago.  currently taking bupropion feels like she may need increase           Complaint of polycystic ovarian syndrome..  PCOS  controlled with medication  no current problems or concern           Additionally, she presents with history of mixed hyperlipidemia.  compliance with treatment has been good.  She specifically denies associated symptoms, including muscle pain and weakness.            Complaint of nicotine dependence, unspecified, uncomplicated..  The symptom began years ago.  no inteest in quitting     ROS:     CONSTITUTIONAL:  Negative for chills, fatigue and fever.      CARDIOVASCULAR:  Negative for chest pain and pedal edema.      RESPIRATORY:  Negative for recent cough and dyspnea.      NEUROLOGICAL:  Negative for dizziness, fainting, headaches and paresthesias.      ENDOCRINE:  Negative for hair loss, polydipsia and polyphagia.      ALLERGIC/IMMUNOLOGIC:  Negative for seasonal allergies.      PSYCHIATRIC:  Negative for anxiety, depression and suicidal thoughts.          Past Medical History / Family History / Social History:         Last Reviewed on  2019 10:57 PM by Gabbie Barnard    Past Medical History:             PREVENTIVE HEALTH MAINTENANCE             DENTAL CLEANING: was last done 2019- [place]     EYE EXAM: was last done been some time     MAMMOGRAM: Done within last 2 years and results in are chart was last done 2020 with normal results     PAP SMEAR: hysterectomy         Surgical History:         Hysterectomy: stage III dysplasia;         Family History:         Positive for Coronary Artery Disease ( pat. GM ) and Myocardial Infarction ( mat. GF ).      Positive for Type 2 Diabetes ( pat. GM ).  Father:  at age 63; Cause of death was pancreatic cacner     Mother: Healthy     Son(s): Healthy; 2 son(s) total     Daughter(s): 1 daughter(s) total; 1 ;  pneumonia/ at 17         Social History:     Occupation: [employer] supervisor     Marital Status:      Children: 3 children         Tobacco/Alcohol/Supplements:     Last Reviewed on 2020 08:49 AM by Veena Osborne    Tobacco: Current Smoker: She currently smokes some days, 1/2 pack per day.      Caffeine:  She admits to consuming caffeine via coffee ( 2 servings per day ) and tea ( 2 servings per day ).          Substance Abuse History:     Last Reviewed on 2019 10:57 PM by Gabbie Barnard    NEGATIVE         Mental Health History:     Last Reviewed on 2019 10:57 PM by Gabbie Barnard        Communicable Diseases (eg STDs):     Last Reviewed on 2019 10:57 PM by Gabbie Barnard        Current Problems:     Last Reviewed on 3/31/2020 11:58 AM by Spurling, Sarah C    Hypothyroidism, unspecified    Dysthymic disorder    Vitamin D deficiency, unspecified    Polycystic ovarian syndrome    Pure hyperglyceridemia    Anogenital (venereal) warts    Mixed hyperlipidemia    Encounter for general adult medical examination without abnormal findings    Encounter for screening mammogram for malignant neoplasm of breast    Inconclusive mammogram    Encounter  for screening for depression    Nicotine dependence, unspecified, uncomplicated    Streptococcal pharyngitis    Cough    Headache    Acute upper respiratory infection, unspecified    Encounter for screening for other viral diseases        Immunizations:     Fluzone Quadrivalent (3+ years) 10/30/2017    PPD 1/30/2007        Allergies:     Last Reviewed on 10/14/2020 10:12 AM by Giselle Raman    Effexor: palpitations     Prozac:          Current Medications:     Last Reviewed on 10/14/2020 10:14 AM by Giselle Raman    TylenoL 325 mg oral tablet [take 1 - 2 tablets (325 - 650 mg) by oral route every 4-6 hours as needed]    Birth control pill  [1 daily po]    Synthroid 125 mcg oral tablet [1 tab daily]    metFORMIN 750 mg oral Tablet, Extended Release 24 hr [1 tab daily]    buPROPion  mg oral Tablet, Extended Release 24 hr [take 1 tablet (150 mg) by oral route once daily]    atorvastatin 40 mg oral tablet [take 1 tablet (40 mg) by oral route once daily @ HS]    fluticasone propionate 50 mcg/actuation Intranasal Spray, Suspension [inhale 1 spray (50 mcg) in each nostril by intranasal route 1 times per day]        Assessment:         E03.9   Hypothyroidism, unspecified       F34.1   Dysthymic disorder       E55.9   Vitamin D deficiency, unspecified       E28.2   Polycystic ovarian syndrome       E78.2   Mixed hyperlipidemia       F17.200   Nicotine dependence, unspecified, uncomplicated       Z13.6   Encounter for screening for cardiovascular disorders           ORDERS:         Meds Prescribed:       [Refilled] Synthroid 125 mcg oral tablet [1 tab daily], #90 (ninety) tablets, Refills: 0 (zero)       [Refilled] atorvastatin 40 mg oral tablet [take 1 tablet (40 mg) by oral route once daily @ HS], #90 (ninety) tablets, Refills: 3 (three)       [Refilled] metFORMIN 750 mg oral Tablet, Extended Release 24 hr [1 tab daily], #90 (ninety) tablets, Refills: 3 (three)       [Refilled] norethindrone (contraceptive) 0.35  mg oral tablet [Take 1 tablet(s) by mouth daily as directed.], #3 (three) packets, Refills: 3 (three)         Lab Orders:       FUTURE  Future order to be done at patients convenience  (Send-Out)            80598  TSH - [facility] TSH  (Send-Out)            FUTURE  Future order to be done at patients convenience  (Send-Out)            70837  BDCB2 - [facility] CBC w/o diff  (Send-Out)            51885  COMP - [facility] Comp. Metabolic Panel  (Send-Out)            53220  LPDP - [facility] Lipid Panel  (Send-Out)            FUTURE  Future order to be done at patients convenience  (Send-Out)            41607  VITD - [facility] Vitamin D, 25 Hydroxy  (Send-Out)                      Plan:         Hypothyroidism, unspecified    Telehealth: Verbal consent obtained for visit to occur via phone call; Staff, other than provider, present during telephone visit include only patient and provider; Total time spent was 11 minutes; 59884--Rklvvhcoa E/M 11-20 minutes     FOLLOW-UP TESTING #1: FOLLOW-UP LABORATORY:  Labs to be scheduled in the future include TSH.            Prescriptions:       [Refilled] Synthroid 125 mcg oral tablet [1 tab daily], #90 (ninety) tablets, Refills: 0 (zero)           Orders:       FUTURE  Future order to be done at patients convenience  (Send-Out)            30200  [facility] TSH  (Send-Out)              Vitamin D deficiency, unspecified        FOLLOW-UP TESTING #1: FOLLOW-UP LABORATORY:  Labs to be scheduled in the future include Vitamin D 25.            Orders:       FUTURE  Future order to be done at patients convenience  (Send-Out)            34093  VITD - [facility] Vitamin D, 25 Hydroxy  (Send-Out)              Polycystic ovarian syndrome          Prescriptions:       [Refilled] metFORMIN 750 mg oral Tablet, Extended Release 24 hr [1 tab daily], #90 (ninety) tablets, Refills: 3 (three)       [Refilled] norethindrone (contraceptive) 0.35 mg oral tablet [Take 1 tablet(s) by mouth daily as directed.], #3 (three) packets, Refills: 3 (three)          Mixed hyperlipidemia          Prescriptions:       [Refilled] atorvastatin 40 mg oral tablet [take 1 tablet (40 mg) by oral route once daily @ HS], #90 (ninety) tablets, Refills: 3 (three)         Encounter for screening for cardiovascular disorders        FOLLOW-UP TESTING #1: FOLLOW-UP LABORATORY:  Labs to be scheduled in the future include CBC without diff, CMP, and lipid panel.            Orders:       FUTURE  Future order to be done at patients convenience  (Send-Out)            90067  BDCB2 - [facility] CBC w/o diff  (Send-Out)            22188  COMP [facility] Comp. Metabolic Panel  (Send-Out)            52453  DP [facility] Lipid Panel  (Send-Out)                  Patient Recommendations:        For  Hypothyroidism, unspecified:            The following laboratory testing has been ordered: TSH         For  Vitamin D deficiency, unspecified:            The following laboratory testing has been ordered:         For  Encounter for screening for cardiovascular disorders:            The following laboratory testing has been ordered: metabolic panel, comprehensive lipid panel             Charge Capture:         Primary Diagnosis:     E03.9  Hypothyroidism, unspecified           Orders:      82066  Phys/QHP telephone evaluation 11-20 minutes  (In-House)              F34.1  Dysthymic disorder     E55.9  Vitamin D deficiency, unspecified     E28.2  Polycystic ovarian syndrome     E78.2  Mixed hyperlipidemia     F17.200  Nicotine dependence, unspecified, uncomplicated     Z13.6  Encounter for screening for cardiovascular disorders

## 2021-05-18 NOTE — PROGRESS NOTES
"Aurora Mendez  1980     Office/Outpatient Visit    Visit Date: Tue, Mar 31, 2020 11:58 am    Provider: Gabbie Barnard N.P. (Assistant: Spurling, Sarah C, MA)    Location: [facility]        Electronically signed by Gabbie Barnard N.P. on  2020 05:51:00 PM                             Subjective:        CC: Ms. Mendez is a 39 year old White female.  headache, no fever, but she said she is exhausted and her body has been hurting.;         HPI:           Headache noted.  Onset was yesterday.  Associated symptoms include ccough, chest discomfort/pleuritic chest discomfort.  She denies fever.  recently treat for possible strep (although culture and swab negative)  recent flu negative - but continues with some symptoms  works in factory around people.  She is very fatigued and body aches     ROS:     CONSTITUTIONAL:  Positive for fatigue.      CARDIOVASCULAR:  Negative for chest pain, orthopnea, paroxysmal nocturnal dyspnea and pedal edema.      RESPIRATORY:  Positive for recent cough ( typically dry; \"normal smoker cough\"  nothing new for her ) and pleuritic chest pain.   Negative for dyspnea.      NEUROLOGICAL:  Positive for headaches.      PSYCHIATRIC:  Negative for anxiety, depression, and sleep disturbances.          Past Medical History / Family History / Social History:         Last Reviewed on 2019 10:57 PM by Gabbie Barnard    Past Medical History:             PREVENTIVE HEALTH MAINTENANCE             DENTAL CLEANING: was last done - [place]     EYE EXAM: was last done been some time     MAMMOGRAM: Done within last 2 years and results in are chart was last done 2020 with normal results     PAP SMEAR: hysterectomy         Surgical History:         Hysterectomy: stage III dysplasia;         Family History:         Positive for Coronary Artery Disease ( pat. GM ) and Myocardial Infarction ( mat. GF ).      Positive for Type 2 Diabetes ( pat. GM ).  Father:  " at age 63; Cause of death was pancreatic cacner     Mother: Healthy     Son(s): Healthy; 2 son(s) total     Daughter(s): 1 daughter(s) total; 1 ;  pneumonia/ at 17         Social History:     Occupation: [employer] supervisor     Marital Status:      Children: 3 children         Tobacco/Alcohol/Supplements:     Last Reviewed on 3/31/2020 11:24 AM by Veena Osborne    Tobacco: Current Smoker: She currently smokes some days, 1/2 pack per day.      Caffeine:  She admits to consuming caffeine via coffee ( 2 servings per day ) and tea ( 2 servings per day ).          Substance Abuse History:     Last Reviewed on 2019 10:57 PM by Gabbie Barnard    NEGATIVE         Mental Health History:     Last Reviewed on 2019 10:57 PM by Gabbie Barnard        Communicable Diseases (eg STDs):     Last Reviewed on 2019 10:57 PM by Gabbie Barnard        Current Problems:     Last Reviewed on 3/31/2020 11:58 AM by Spurling, Sarah C    Hypothyroidism, unspecified    Dysthymic disorder    Vitamin D deficiency, unspecified    Polycystic ovarian syndrome    Pure hyperglyceridemia    Anogenital (venereal) warts    Mixed hyperlipidemia    Encounter for general adult medical examination without abnormal findings    Encounter for screening mammogram for malignant neoplasm of breast    Inconclusive mammogram    Encounter for screening for depression    Nicotine dependence, unspecified, uncomplicated    Streptococcal pharyngitis        Immunizations:     Fluzone Quadrivalent (3+ years) 10/30/2017    PPD 2007        Allergies:     Last Reviewed on 3/31/2020 11:58 AM by Spurling, Sarah C    Effexor: palpitations     Prozac:          Current Medications:     Last Reviewed on 3/31/2020 12:00 PM by Spurling, Sarah C    Synthroid 0.125mg Tablet [1 tab daily]    metFORMIN 750 mg oral Tablet, Extended Release 24 hr [1 tab daily]    buPROPion HCl 150 mg oral Tablet, Extended Release 24 hr [take 1 tablet (150 mg)  by oral route once daily]    atorvastatin 40 mg oral tablet [take 1 tablet (40 mg) by oral route once daily @ HS]    TylenoL 325 mg oral tablet [take 1 - 2 tablets (325 - 650 mg) by oral route every 4-6 hours as needed]    Birth control pill  [1 daily po]        Objective:        Exams:     PHYSICAL EXAM:     GENERAL: vital signs recorded - well developed, well nourished;  no apparent distress;     EYES: extraocular movements intact; conjunctiva and cornea are normal; PERRLA;     E/N/T:  normal EACs, TMs, nasal/oral mucosa, teeth, gingiva, and oropharynx;     NECK: range of motion is normal; thyroid is non-palpable;     RESPIRATORY: normal respiratory rate and pattern with no distress; normal breath sounds with no rales, rhonchi, wheezes or rubs;     CARDIOVASCULAR: normal rate; rhythm is regular;  no systolic murmur; no edema;     GASTROINTESTINAL: nontender; normal bowel sounds; no organomegaly;     NEUROLOGICAL:  cranial nerves, motor and sensory function, reflexes, gait and coordination are all intact;     PSYCHIATRIC:  appropriate affect and demeanor; normal speech pattern; grossly normal memory;         Assessment:         R51   Headache       R05   Cough           ORDERS:         Meds Prescribed:       [New Rx] cetirizine 10 mg oral tablet [take 1 tablet (10 mg) by oral route once daily], #30 (thirty) tablets, Refills: 0 (zero)       [New Rx] fluticasone propionate 50 mcg/actuation Intranasal Spray, Suspension [inhale 1 spray (50 mcg) in each nostril by intranasal route 1 times per day], #16 (sixteen) grams, Refills: 0 (zero)                 Plan:         Headache    Telehealth: Verbal consent obtained for visit to occur via phone call; Total time spent was 9 minutes; [id number]--Gsaplojdg E/M 5-10 minutes           Prescriptions:       [New Rx] cetirizine 10 mg oral tablet [take 1 tablet (10 mg) by oral route once daily], #30 (thirty) tablets, Refills: 0 (zero)       [New Rx] fluticasone propionate 50  mcg/actuation Intranasal Spray, Suspension [inhale 1 spray (50 mcg) in each nostril by intranasal route 1 times per day], #16 (sixteen) grams, Refills: 0 (zero)         CoughRecommend that she self isolate - this has been ongoing for some time - her symptoms are quite vague - but  given her symptoms and that she works closely with others would recommend isolate for 7 days from the start of symptoms and to be at least 3 days symptoms freeShe has been instructed to stay home - quarantine and good hand hygiene            Charge Capture:         Primary Diagnosis:     R51  Headache           Orders:      38573  Phys/QHP telephone evaluation 5-10 min  (In-House)              R05  Cough

## 2021-05-18 NOTE — PROGRESS NOTES
Aurora Mendez  1980     Office/Outpatient Visit    Visit Date: Mon, Oct 19, 2020 03:44 pm    Provider: Gabbie Barnard N.P. (Assistant: Veena Osborne MA)    Location: [facility]        Electronically signed by Gabbie Barnard N.P. on  10/22/2020 05:47:22 AM                             Subjective:        CC: Ms. Mendez is a 39 year old White female.  anxiety;         HPI:           Concerning major depressive disorder, recurrent severe without psychotic features, visit today is because of worsening symptoms.  The diagnosis of depression was made several years ago.  Ms Mendez is here today for follow up for her worsening symptoms.  She reports today taht she is having a hard time with day to day functions.  Her mother passed away in june and they went over to clean out her house and she found some of her daughters things there that she was not pre[pared to find.Her daughter passed away in 2014 from pneumonia and she has never fully recovered from the grief and the guilt of this event. She reports today that she is struggling with getting out of bed  struggling with relationships She reports today that she has 2 sons that keep her going, and if not for them 'I would not be here today'  The depression is one that comes and goes, but never fully goes away     ROS:     CONSTITUTIONAL:  Negative for chills, fatigue and fever.      CARDIOVASCULAR:  Negative for chest pain and pedal edema.      RESPIRATORY:  Negative for recent cough and dyspnea.      GASTROINTESTINAL:  Negative for abdominal pain, constipation, diarrhea, heartburn, nausea and vomiting.      GENITOURINARY:  Negative for dysuria and change in urine stream.      MUSCULOSKELETAL:  Negative for arthralgias and myalgias.      INTEGUMENTARY/BREAST:  Negative for pruritis and rash.      NEUROLOGICAL:  Negative for dizziness, fainting, headaches and paresthesias.      ENDOCRINE:  Negative for hair loss, polydipsia and polyphagia.       ALLERGIC/IMMUNOLOGIC:  Negative for seasonal allergies.      PSYCHIATRIC:  Positive for crying spells, depression, anhedonia, difficulty concentrating, sadness, sleep disturbance and suicidal thoughts ( no actual plan ).   Negative for anxiety.          Past Medical History / Family History / Social History:         Last Reviewed on 2019 10:57 PM by Gabbie Barnard    Past Medical History:             PREVENTIVE HEALTH MAINTENANCE             DENTAL CLEANING: was last done - [place]     EYE EXAM: was last done been some time     MAMMOGRAM: Done within last 2 years and results in are chart was last done 2020 with normal results     PAP SMEAR: hysterectomy         Surgical History:         Hysterectomy: stage III dysplasia;         Family History:         Positive for Coronary Artery Disease ( pat. GM ) and Myocardial Infarction ( mat. GF ).      Positive for Type 2 Diabetes ( pat. GM ).  Father:  at age 63; Cause of death was pancreatic cacner     Mother: Healthy     Son(s): Healthy; 2 son(s) total     Daughter(s): 1 daughter(s) total; 1 ;  pneumonia/ at 17         Social History:     Occupation: [employer] supervisor     Marital Status:      Children: 3 children         Tobacco/Alcohol/Supplements:     Last Reviewed on 10/14/2020 10:14 AM by Giselle Raman    Tobacco: Current Smoker: She currently smokes some days, 1/2 pack per day.      Caffeine:  She admits to consuming caffeine via coffee ( 2 servings per day ) and tea ( 2 servings per day ).          Substance Abuse History:     Last Reviewed on 2019 10:57 PM by Gabbie Barnard    NEGATIVE         Mental Health History:     Last Reviewed on 2019 10:57 PM by Gabbie Barnard        Communicable Diseases (eg STDs):     Last Reviewed on 2019 10:57 PM by Gabbie Barnard        Current Problems:     Last Reviewed on 3/31/2020 11:58 AM by Spurling, Sarah C    Hypothyroidism, unspecified    Dysthymic  disorder    Vitamin D deficiency, unspecified    Polycystic ovarian syndrome    Pure hyperglyceridemia    Anogenital (venereal) warts    Mixed hyperlipidemia    Encounter for general adult medical examination without abnormal findings    Encounter for screening mammogram for malignant neoplasm of breast    Inconclusive mammogram    Encounter for screening for depression    Nicotine dependence, unspecified, uncomplicated    Streptococcal pharyngitis    Cough    Headache    Acute upper respiratory infection, unspecified    Encounter for screening for other viral diseases    Encounter for screening for cardiovascular disorders        Immunizations:     Fluzone Quadrivalent (3+ years) 10/30/2017    PPD 1/30/2007        Allergies:     Last Reviewed on 10/19/2020 03:47 PM by Veena Osborne    Effexor: palpitations     Prozac:          Current Medications:     Last Reviewed on 10/19/2020 03:44 PM by Veena Osborne    TylenoL 325 mg oral tablet [take 1 - 2 tablets (325 - 650 mg) by oral route every 4-6 hours as needed]    Birth control pill  [1 daily po]    Synthroid 125 mcg oral tablet [1 tab daily]    metFORMIN 750 mg oral Tablet, Extended Release 24 hr [1 tab daily]    norethindrone (contraceptive) 0.35 mg oral tablet [Take 1 tablet(s) by mouth daily as directed.]    buPROPion  mg oral Tablet, Extended Release 24 hr [take two tablets once daily ]    atorvastatin 40 mg oral tablet [take 1 tablet (40 mg) by oral route once daily @ HS]        Objective:        Vitals:         Current: 10/19/2020 3:50:49 PM    Ht:  5 ft, 6.5 in;  Wt: 250.6 lbs;  BMI: 39.8T: 98 F (temporal);  BP: 139/78 mm Hg (right arm, sitting);  P: 92 bpm (right arm (BP Cuff), sitting);  sCr: 0.75 mg/dL;  GFR: 125.90        Exams:     PHYSICAL EXAM:     GENERAL: vital signs recorded - well developed, well nourished;  no apparent distress;     RESPIRATORY: normal appearance and symmetric expansion of chest wall; normal respiratory rate and pattern with  no distress; normal breath sounds with no rales, rhonchi, wheezes or rubs;     MUSCULOSKELETAL: normal gait; normal range of motion of all major muscle groups; no limb or joint pain with range of motion;     NEUROLOGIC: mental status: alert and oriented x 3;     PSYCHIATRIC: affect/demeanor: depressed, tearful;  normal speech pattern;         Assessment:         F41.1   Generalized anxiety disorder       F33.2   Major depressive disorder, recurrent severe without psychotic features           ORDERS:         Procedures Ordered:       REFER  Referral to Specialist or Other Facility  (Send-Out)              Other Orders:         Depression screen positive and follow up plan documented  (In-House)                      Plan:         Generalized anxiety disorderref[facility] minutes spent face to face consultation         REFERRALS:  Referral initiated to a psychologist ( [facility] ).            Orders:       REFER  Referral to Specialist or Other Facility  (Send-Out)              Major depressive disorder, recurrent severe without psychotic features    MIPS PHQ-9 Depression Screening: Completed form scanned and in chart; Total Score 27 Positive Depression Screen: Suicide Risk Assessment completed--denies suicidal/homicidal ideation; Referral will be initated to provider qualified to treat depression; Pharmacologic intervention initiated/modified           Orders:         Depression screen positive and follow up plan documented  (In-House)                  Charge Capture:         Primary Diagnosis:     F41.1  Generalized anxiety disorder           Orders:      24767  Office/outpatient visit; established patient, level 4 (31 minutes)  (In-House)              F33.2  Major depressive disorder, recurrent severe without psychotic features           Orders:        Depression screen positive and follow up plan documented  (In-House)

## 2021-05-18 NOTE — PROGRESS NOTES
Auorra Mendez  1980     Office/Outpatient Visit    Visit Date: Mon, Dec 2, 2019 01:40 pm    Provider: Jimmy Fenton MD (Assistant: Giselle Raman RN)    Location: [facility]        Electronically signed by Jimmy Fenton MD on  2019 06:47:23 AM                             Subjective:        CC: congested    HPI:       Aurora is in today for follow up on congestion and sore throat.  She says that this developed over the weekend.  She has had some cough and sore throat.  She is also congested in her head.  She has not felt well all weekend.  She did have some chills and achy feeling today.  She has not had flu shot.  She will cough up some phlegm if she coughs hard enough.    ROS:     CONSTITUTIONAL:  Positive for chills.   Negative for fever.      CARDIOVASCULAR:  Negative for chest pain and palpitations.      RESPIRATORY:  Positive for recent cough.   Negative for dyspnea.      GASTROINTESTINAL:  Negative for abdominal pain, nausea and vomiting.      INTEGUMENTARY/BREAST:  Negative for atypical mole(s) and rash.          Past Medical History / Family History / Social History:         Last Reviewed on 2019 01:59 PM by Jimmy Fenton    Past Medical History:             PREVENTIVE HEALTH MAINTENANCE             PAP SMEAR: hysterectomy         Surgical History:         Hysterectomy: stage III dysplasia;         Family History:         Positive for Coronary Artery Disease ( pat. GM ) and Myocardial Infarction ( mat. GF ).      Positive for Type 2 Diabetes ( pat. GM ).  Father:  at age 63; Cause of death was pancreatic cacner     Mother: Healthy     Son(s): Healthy; 2 son(s) total     Daughter(s): 1 daughter(s) total; 1 ;  pneumonia/ at 17         Social History:     Occupation: [employer] supervisor     Marital Status:      Children: 3 children         Tobacco/Alcohol/Supplements:     Last Reviewed on 2019 01:59 PM by Jimmy Fenton  Jhoan    Tobacco: Current Smoker: She currently smokes some days, 1-5 cigarettes per day.      Caffeine:  She admits to consuming caffeine via coffee ( 2 servings per day ) and tea ( 2 servings per day ).          Substance Abuse History:     Last Reviewed on 12/02/2019 01:59 PM by Jimmy Fenton    NEGATIVE         Mental Health History:     Last Reviewed on 12/02/2019 01:59 PM by Jimmy Fenton        Communicable Diseases (eg STDs):     Last Reviewed on 12/02/2019 01:59 PM by Jimmy Fenton        Current Problems:     Last Reviewed on 12/02/2019 01:59 PM by Jimmy Fenton    Hypothyroidism, unspecified    Dysthymic disorder    Vitamin D deficiency, unspecified    Polycystic ovarian syndrome    Pure hyperglyceridemia    Anogenital (venereal) warts    Acute pharyngitis, unspecified    Myalgia, unspecified site        Immunizations:     Fluzone Quadrivalent (3+ years) 10/30/2017    PPD 1/30/2007        Allergies:     Last Reviewed on 12/02/2019 01:59 PM by Jimmy Fenton    Effexor: palpitations     Prozac:          Current Medications:     Last Reviewed on 12/02/2019 01:59 PM by Jimmy Fenton    Synthroid 0.125mg Tablet [1 tab daily]    Norethindrone Acetate 0.35mg Tablet [Take 1 tablet(s) by mouth daily as directed.]    Metformin HCl 750mg Tablets, Extended Release [1 tab daily]    Chantix Continuing Month Box 1mg Tablet [1 tablet twice daily]    Nystatin 100,000U/1gm Topical Powder [Apply twice per day until rash cleared, then as needed]    buPROPion HCl 75 mg oral tablet [once daily]        Objective:        Vitals:         Current: 12/2/2019 1:46:33 PM    Ht:  5 ft, 6.5 in;  Wt: 252.2 lbs;  BMI: 40.1T: 98.2 F (oral);  BP: 136/72 mm Hg (left arm, sitting);  P: 99 bpm (left arm (BP Cuff), sitting);  sCr: 0.75 mg/dL;  GFR: 127.47        Exams:     PHYSICAL EXAM:     GENERAL: vital signs recorded - well developed, well nourished;  no apparent distress;     EYES:  extraocular movements intact; conjunctiva and cornea are normal; PERRL;     E/N/T:  normal EACs, TMs, nasal/oral mucosa, teeth, gingiva, and oropharynx;     NECK: range of motion is normal; thyroid is non-palpable;     RESPIRATORY: normal respiratory rate and pattern with no distress; normal breath sounds with no rales, rhonchi, wheezes or rubs;     CARDIOVASCULAR: normal rate; rhythm is regular;  no systolic murmur; no edema;     GASTROINTESTINAL: nontender; normal bowel sounds; no organomegaly;     LYMPHATIC: no enlargement of cervical or facial nodes; no supraclavicular nodes;     BREAST/INTEGUMENT: SKIN: no significant rashes or lesions; no suspicious moles;         Lab/Test Results:         Rapid Strep Screen: Negative (12/02/2019),     Performed by:: tls (12/02/2019),     Influenza A and B: Negative (12/02/2019),             Assessment:         J02.9   Acute pharyngitis, unspecified       M79.10   Myalgia, unspecified site       E78.2   Mixed hyperlipidemia           ORDERS:         Meds Prescribed:       [New Rx] pseudoephedrine-guaifenesin  mg oral Tablet, Extended Release 12 hr [Take 1 tablet by mouth every 12 hours as needed], #20 (twenty) tablets, Refills: 0 (zero)         Lab Orders:       [id number]  Group A Streptococcus detection by immunoassay with direct optical observation  (In-House)            83534-54  Infectious agent antigen detection by immunoassay; Influenza  (In-House)            54132  Infectious agent antigen detection by immunoassay; Influenza  (In-House)            01299  [facility] Throat culture, strep  (Send-Out)            92611  ALT - [facility] ALT (SGPT)  (Send-Out)            29746  DP [facility] Lipid Panel  (Send-Out)                      Plan:         Acute pharyngitis, unspecified        RECOMMENDATIONS given include: This is most likely a viral syndrome.  Her strep and flu tests look good.  We will cover her symptoms as noted and ask her to contact us if not improving later in the  week.  No other changes are anticipated.  She is due for repeat evaluation on the cholesterol.  We will make those arrangements..  She did leave work early today and will be off tomorrow.  She will plan to return on Wednesday.           Prescriptions:       [New Rx] pseudoephedrine-guaifenesin  mg oral Tablet, Extended Release 12 hr [Take 1 tablet by mouth every 12 hours as needed], #20 (twenty) tablets, Refills: 0 (zero)           Orders:       [id number]  Group A Streptococcus detection by immunoassay with direct optical observation  (In-House)            96722  [facility] Throat culture, strep  (Send-Out)                Patient Education Handouts:       Antibiotics          Myalgia, unspecified site          Orders:       87160-40  Infectious agent antigen detection by immunoassay; Influenza  (In-House)            48306  Infectious agent antigen detection by immunoassay; Influenza  (In-House)              Mixed hyperlipidemia    LABORATORY:  Labs ordered to be performed today include ALT and lipid panel.            Orders:       78320  ALT - [facility] ALT (SGPT)  (Send-Out)            15394  [facility] Lipid Panel  (Send-Out)                  Charge Capture:         Primary Diagnosis:     J02.9  Acute pharyngitis, unspecified           Orders:      11828  Office/outpatient visit; established patient, level 4  (In-House)            99574  Group A Streptococcus detection by immunoassay with direct optical observation  (In-House)              M79.10  Myalgia, unspecified site           Orders:      [id number]  Infectious agent antigen detection by immunoassay; Influenza  (In-House)            07880  Infectious agent antigen detection by immunoassay; Influenza  (In-House)              E78.2  Mixed hyperlipidemia

## 2021-05-18 NOTE — PROGRESS NOTES
Aurora Mendez 1980     Office/Outpatient Visit    Visit Date: Tue, Feb 12, 2019 10:41 am    Provider: Abimbola Roman N.P. (Assistant: Cris Tavera MA)    Location: [facility]        Electronically signed by Abimbola Roman N.P. on  02/16/2019 10:56:16 AM                             SUBJECTIVE:        CC:     Ms. Mendez is a 38 year old White female.  This is a follow-up visit.  Patient presents today for six month follow up, also wants to discuss back and hip pain, complaints of vaginal discharge and odor, bump on left eye;         HPI:         Patient complains of hip pain.      States R hip pain x 5 months. States family hx of bad joints. States laying in bed with pain and not lying on that side. She wonders if her weight is part of the problem.  Denies injury.         With regard to the vaginal discharge, pt states vaginal discharge x several weeks. She could not come in to the office. She has had BV in the past. She had cream at home and ran out. States seeing a difference when using just didn't have enought to treat it completely.          L eye: Pt states L eye pain and nodule x 4-5 months. States finding a nodule to the L ourter upper lid. She feels a sharp stabbing pain. She was worried about infection but it hasn't gone away.          Acquired hypothyroidism details; states doing well on med. Here for f/u and refills.          Concerning hyperlipidemia, pt had a slight elevation of cholesterol at her last visit in 8/2018. Here for f/u. States she does not eat healthy or exercise. She is wanting to make changes.          PCOS: States doing well on meds. Here for f/u and refills.      ROS:     CONSTITUTIONAL:  Negative for chills, fatigue, fever, and weight change.      EYES:  Negative for blurred vision.      CARDIOVASCULAR:  Negative for chest pain, orthopnea, paroxysmal nocturnal dyspnea and pedal edema.      RESPIRATORY:  Negative for dyspnea.      GASTROINTESTINAL:   Positive for vaginal discharge.   Negative for abdominal pain, constipation, diarrhea, nausea or vomiting.      MUSCULOSKELETAL:  Positive for hip pain.      NEUROLOGICAL:  Negative for dizziness, headaches, paresthesias, and weakness.      PSYCHIATRIC:  Negative for anxiety, depression, and sleep disturbances.          PMH/FMH/SH:     Last Reviewed on 2019 11:04 AM by Abimbola Roman    Past Medical History:             PREVENTIVE HEALTH MAINTENANCE             PAP SMEAR: hysterectomy         Surgical History:         Hysterectomy: stage III dysplasia;         Family History:         Positive for Coronary Artery Disease ( pat. GM ) and Myocardial Infarction ( mat. GF ).      Positive for Type 2 Diabetes ( pat. GM ).  Father:  at age 63; Cause of death was pancreatic cacner     Mother: Healthy     Son(s): Healthy; 2 son(s) total     Daughter(s): 1 daughter(s) total; 1 ;  pneumonia/ at 17         Social History:     Occupation: [employer] supervisor     Marital Status:      Children: 3 children         Tobacco/Alcohol/Supplements:     Last Reviewed on 2019 11:04 AM by Abimbola Roman    Tobacco: She has a past history of cigarette smoking; quit date:  .      Caffeine:  She admits to consuming caffeine via coffee ( 2 servings per day ) and tea ( 2 servings per day ).          Substance Abuse History:     Last Reviewed on 2019 11:04 AM by Abimbola Roman    NEGATIVE         Mental Health History:     Last Reviewed on 2019 11:04 AM by Abimbola Roman        Communicable Diseases (eg STDs):     Last Reviewed on 2019 11:04 AM by Abimbola Roman            Current Problems:     Last Reviewed on 2019 11:04 AM by Abimbola Roman    Sinus headaches     Hypertrophy of tonsils     Screening for hyperlipidemia     Polycystic ovaries     Nasal Congestion     Leukocytosis, unspecified     Plantar wart     Acquired hypothyroidism, unspecified cause     Urinary  frequency     Elevated fasting glucose     History of recurrent UTI's     GERD     Vitamin D deficiency     Ovarian cyst     High-risk sexual behavior     Obstructive sleep apnea (adult) (pediatric)     Depression with anxiety     Acquired hypothyroidism         Immunizations:     Fluzone Quadrivalent (3+ years) 10/30/2017     PPD 1/30/2007         Allergies:     Last Reviewed on 2/12/2019 11:04 AM by Abimbola Roman    Effexor: palpitations    Prozac:        Current Medications:     Last Reviewed on 2/12/2019 11:04 AM by Abimbola Roman    Chantix Continuing Month Box 1mg Tablet 1 tablet twice daily     Synthroid 0.125mg Tablet 1 tab daily     Claritin 10mg Tablet 1 tab(s) by mouth daily     Vitamin D3 5,000IU Capsules 1 capsule every day     OTC Calcium w/ Vitamin D3 Take one tablet once daily     Norethindrone Acetate 0.35mg Tablet Take 1 tablet(s) by mouth daily as directed.     Metformin HCl 750mg Tablets, Extended Release 1 tab daily         OBJECTIVE:        Vitals:         Current: 2/12/2019 10:47:10 AM    Ht:  5 ft, 6.5 in;  Wt: 250.8 lbs;  BMI: 39.9    T: 98.2 F (oral);  BP: 113/82 mm Hg (left arm, sitting);  P: 101 bpm (left arm (BP Cuff), sitting);  sCr: 0.82 mg/dL;  GFR: 116.31        Exams:     PHYSICAL EXAM:     GENERAL: vital signs recorded - well developed, well nourished;  no apparent distress;     EYES: extraocular movements intact; conjunctiva and cornea are normal; PERRLA;     NECK: range of motion is normal; thyroid is non-palpable;     RESPIRATORY: normal respiratory rate and pattern with no distress; normal breath sounds with no rales, rhonchi, wheezes or rubs;     CARDIOVASCULAR: normal rate; rhythm is regular;  no systolic murmur; no edema;     GASTROINTESTINAL: nontender; normal bowel sounds; no organomegaly;     BREAST/INTEGUMENT: L upper lateral eylid with hard, nonmobile nodule, no erythema.;     MUSCULOSKELETAL: R hip tenderness;     NEUROLOGICAL:  cranial nerves, motor and sensory  function, reflexes, gait and coordination are all intact;     PSYCHIATRIC:  appropriate affect and demeanor; normal speech pattern; grossly normal memory;         ASSESSMENT:           719.45   M25.551  Hip pain              DDx:     616.10   N76.0  Vaginal discharge              DDx:     379.91   H57.12  Eye pain              DDx:     244.8   E03.9  Acquired hypothyroidism              DDx:     272.4   E78.1  Hyperlipidemia              DDx:     256.4   E28.2  Polycystic ovaries              DDx:         ORDERS:         Meds Prescribed:       MetroGel (Metronidazole) 0.75% Vaginal Gel Insert 1 applicatorful(s) in vagina at bedtime for 7 days  #70 (Seventy) gm Refills: 0       Refill of: Synthroid (Levothyroxine Sodium) 0.125mg Tablet 1 tab daily  #90 (Ninety) tablet(s) Refills: 1       Refill of: Metformin HCl 750mg Tablets, Extended Release 1 tab daily  #90 (Ninety) tablet(s) Refills: 1         Lab Orders:       [id number]  [facility] Lipid Panel  (Send-Out)         93277  [facility] CBC with 3 part diff  (Send-Out)         45546  COMP - [facility] Comp. Metabolic Panel  (Send-Out)         35418  TSH - [facility] TSH  (Send-Out)         [facility]  [facility]  ([facility]           Procedures Ordered:       REFER  Referral to Specialist or Other Facility  (Send-Out)                   PLAN:          Hip pain     LABORATORY:  Labs ordered to be performed today include Arthritis Profile.            Orders:       [id number]  [facility] Arthritis Profile  (Send-Out)            Vaginal discharge Recommended Luvena           Prescriptions:       MetroGel (Metronidazole) 0.75% Vaginal Gel Insert 1 applicatorful(s) in vagina at bedtime for 7 days  #70 (Seventy) gm Refills: 0          Eye pain         REFERRALS:  Referral initiated to a dermatologist ( Dr. Rubi Pollock; for evaluation of L eyelid nodule ).           Acquired hypothyroidism     LABORATORY:  Labs ordered to be performed today include CBC, Comprehensive metabolic  panel, lipid panel, and TSH.            Prescriptions:       Refill of: Synthroid (Levothyroxine Sodium) 0.125mg Tablet 1 tab daily  #90 (Ninety) tablet(s) Refills: 1           Orders:       [id number]  BDCBC - [facility] CBC with 3 part diff  (Send-Out)         26087  COMP [facility] Comp. Metabolic Panel  (Send-Out)         76828  TSH - [facility] TSH  (Send-Out)            Hyperlipidemia         REFERRALS:  Referral initiated to Dietitian.            Orders:       [id number]  [facility] Lipid Panel  (Send-Out)         REFER  Referral to Specialist or Other Facility  (Send-Out)            Polycystic ovaries           Prescriptions:       Refill of: Metformin HCl 750mg Tablets, Extended Release 1 tab daily  #90 (Ninety) tablet(s) Refills: 1             Patient Recommendations:        For  Hip pain:     I also recommend ^.              CHARGE CAPTURE:           Primary Diagnosis:     719.45 Hip pain            M25.551    Pain in right hip              Orders:          [id number]   Office/outpatient visit; established patient, level 4  (In-House)           616.10 Vaginal discharge            N76.0    Acute vaginitis    379.91 Eye pain            H57.12    Ocular pain, left eye    244.8 Acquired hypothyroidism            E03.9    Hypothyroidism, unspecified    272.4 Hyperlipidemia            E78.1    Pure hyperglyceridemia    256.4 Polycystic ovaries            E28.2    Polycystic ovarian syndrome

## 2021-05-18 NOTE — PROGRESS NOTES
Aurora Mendez 1980     Office/Outpatient Visit    Visit Date: Tue, Mar 19, 2019 11:29 am    Provider: Ginger Contreras MD (Assistant: Cris Tavera MA)    Location: [facility]        Electronically signed by Ginger Contreras MD on  2019 01:31:57 PM                             SUBJECTIVE:        CC:     Ms. Mendez is a 38 year old White female.  presents today due to complaints of sore throat, body aches, cough X 1 day         HPI:     Aurora is here today with complaint of cough and body aches for the past day.  Sx started last night with sore throat.  +Night sweats.   +Fatigue and malaise, no fevers or chills.  No headaches.  No rhinorrhea, +congestion.  No sore throat.  +Cough, minimal production of yellow sputum.  +Chest tightness but no SOB.  No abd pain, N/V/D.  +Body aches.  Sick contacts: none known (no flu).  Coworker last week missed for Strep throat.     ROS:     CONSTITUTIONAL:  Positive for fatigue and malaise.   Negative for chills or fever.      EYES:  Negative for blurred vision.      E/N/T:  Positive for nasal congestion and sore throat.   Negative for ear pain, frequent rhinorrhea or sinus pressure.      CARDIOVASCULAR:  Negative for chest pain and palpitations.      RESPIRATORY:  Positive for recent cough.   Negative for dyspnea, pleuritic chest pain or frequent wheezing.      GASTROINTESTINAL:  Negative for abdominal pain, diarrhea, nausea and vomiting.      MUSCULOSKELETAL:  Positive for myalgias.          [facility]/:     Last Reviewed on 3/19/2019 11:49 AM by Ginger Contreras    Past Medical History:             PREVENTIVE HEALTH MAINTENANCE             PAP SMEAR: hysterectomy         Surgical History:         Hysterectomy: stage III dysplasia;         Family History:         Positive for Coronary Artery Disease ( pat. GM ) and Myocardial Infarction ( mat. GF ).      Positive for Type 2 Diabetes ( pat. GM ).  Father:  at age 63; Cause of death was pancreatic  sahra     Mother: Healthy     Son(s): Healthy; 2 son(s) total     Daughter(s): 1 daughter(s) total; 1 ;  pneumonia/ at 17         Social History:     Occupation: [employer] supervisor     Marital Status:      Children: 3 children         Tobacco/Alcohol/Supplements:     Last Reviewed on 3/19/2019 11:49 AM by Ginger Contreras    Tobacco: She has a past history of cigarette smoking; quit date:  .      Caffeine:  She admits to consuming caffeine via coffee ( 2 servings per day ) and tea ( 2 servings per day ).          Substance Abuse History:     Last Reviewed on 3/19/2019 11:49 AM by Ginger Contreras    NEGATIVE         Mental Health History:     Last Reviewed on 3/19/2019 11:49 AM by Ginger Contreras        Communicable Diseases (eg STDs):     Last Reviewed on 3/19/2019 11:49 AM by Ginger Contreras            Current Problems:     Last Reviewed on 2019 11:04 AM by Abimbola Roman    Hyperlipidemia     Hip pain     Hypertrophy of tonsils     Screening for hyperlipidemia     Polycystic ovaries     Nasal Congestion     Acquired hypothyroidism, unspecified cause     Elevated fasting glucose     History of recurrent UTI's     GERD     Vitamin D deficiency     High-risk sexual behavior     Obstructive sleep apnea (adult) (pediatric)     Depression with anxiety     Acquired hypothyroidism     Eye pain     Vaginal discharge         Immunizations:     Fluzone Quadrivalent (3+ years) 10/30/2017     PPD 2007         Allergies:     Last Reviewed on 2019 11:04 AM by Abimbola Roman    Effexor: palpitations    Prozac:        Current Medications:     Last Reviewed on 2019 11:04 AM by Abimbola Roman    Synthroid 0.125mg Tablet 1 tab daily     Chantix Continuing Month Box 1mg Tablet 1 tablet twice daily     Metformin HCl 750mg Tablets, Extended Release 1 tab daily     Claritin 10mg Tablet 1 tab(s) by mouth daily     Vitamin D3 5,000IU Capsules 1 capsule every day     OTC Calcium w/  Vitamin D3 Take one tablet once daily     Norethindrone Acetate 0.35mg Tablet Take 1 tablet(s) by mouth daily as directed.         OBJECTIVE:        Vitals:         Current: 3/19/2019 11:33:18 AM    Ht:  5 ft, 6.5 in;  Wt: 248.8 lbs;  BMI: 39.6    T: 98.8 F (oral);  BP: 123/67 mm Hg (left arm, sitting);  P: 97 bpm (left arm (BP Cuff), sitting);  sCr: 0.83 mg/dL;  GFR: 114.52        Exams:     PHYSICAL EXAM:     GENERAL: vital signs recorded - well developed, well nourished;  well groomed;  no apparent distress;     EYES: extraocular movements intact; conjunctiva and cornea are normal; PERRLA;     E/N/T: NOSE: nasal mucosa is edematous and erythematous;  OROPHARYNX: oral mucosa reveals erythema;  posterior pharynx shows no exudate;     RESPIRATORY: normal respiratory rate and pattern with no distress; normal breath sounds with no rales, rhonchi, wheezes or rubs;     CARDIOVASCULAR: normal rate; rhythm is regular;  no systolic murmur;     GASTROINTESTINAL: nontender; normal bowel sounds;     LYMPHATIC: no enlargement of cervical or facial nodes;     MUSCULOSKELETAL: normal gait; normal overall tone         Lab/Test Results:             Rapid Strep Screen:  Negative (03/19/2019),     Influenza A and B:  Negative (03/19/2019),     Performed by::  osito (03/19/2019),             ASSESSMENT           465.9   J00  Viral URI              DDx:     786.2   R05  Cough              DDx:     462   J02.8  Sore throat              DDx:         ORDERS:         Meds Prescribed:       Tessalon Perles (Benzonatate) 100mg Capsules Take 1 capsule PO TID PRN for cough  #45 (Forty Five) capsule(s) Refills: 0         Lab Orders:       [id number]  Infectious agent antigen detection by immunoassay; Influenza  (In-House)         48916-67  Infectious agent antigen detection by immunoassay; Influenza  (In-House)         30741  Group A Streptococcus detection by immunoassay with direct optical observation  (In-House)         12383  [facility] Throat  culture, strep  (Send-Out)                   PLAN:          Viral URI Viral URI.  No evidence of bacterial infection.  Symptomatic care recommended with OTC analgesics for pain/fever, OTC decongestants and cough suppressants prn.  Stay well hydrated.  Humidifier in the bedroom at night.  Hot tea with lemon and honey.  RTC prn worsening or failure to improve of sx.  Work note given for today and tomorrow; however, if she is still feeling sick on Thursday, she may call back and it is OK to give an extension.           Prescriptions:       Tessalon Perles (Benzonatate) 100mg Capsules Take 1 capsule PO TID PRN for cough  #45 (Forty Five) capsule(s) Refills: 0           Patient Education Handouts:       Common Cold           Cough           Orders:       [id number]  Infectious agent antigen detection by immunoassay; Influenza  (In-House)         34924-39  Infectious agent antigen detection by immunoassay; Influenza  (In-House)            Sore throat           Orders:       [id number]  Group A Streptococcus detection by immunoassay with direct optical observation  (In-House)         42877  [facility] Throat culture, strep  (Send-Out)               CHARGE CAPTURE           **Please note: ICD descriptions below are intended for billing purposes only and may not represent clinical diagnoses**        Primary Diagnosis:         465.9 Viral URI            J00    Acute nasopharyngitis [common cold]              Orders:          [id number]   Office/outpatient visit; established patient, level 3  (In-House)           786.2 Cough            R05    Cough              Orders:          39970   Infectious agent antigen detection by immunoassay; Influenza  (In-House)             31670 -59  Infectious agent antigen detection by immunoassay; Influenza  (In-House)           462 Sore throat            J02.8    Acute pharyngitis due to other specified organisms              Orders:          12011   Group A Streptococcus detection by immunoassay with  direct optical observation  (In-House)

## 2021-05-18 NOTE — PROGRESS NOTES
Aurora Mendez  1980     Office/Outpatient Visit    Visit Date: Mon, Jan 4, 2021 08:44 am    Provider: Gabbie Barnard N.P. (Assistant: Tori Pickard MA)    Location: [facility]        Electronically signed by Gabbie Barnard N.P. on  01/04/2021 12:37:44 PM                             Subjective:        CC: Ms. Mendez is a 40 year old White female.  This is a follow-up visit.          HPI:           Dx with adjustment disorder with depressed mood; this is a routine follow-up.  The diagnosis of depression was made several years ago.  Aurora is here for follow up  - she is currently on FMLA for her depression.  We have anticipated her to RTW tomorrow 1/5.  At her last visit on 12/9 - we discussed some activities that she shoudl try to encourage her to help with her grieving process - she was encouraged to find a grief support group / make a plan to help her be more active during the day - Today she reports that her counselor is trying to find a support person for her to reach out to on an individual basis to see if this will help.  She also has been avoiding naps during the day, but continues to have a hard time with insomnia - she is unable to shut her mind off with worry and contemplating what the next day will bring.  [place] 1/8 appt for follow up  continues to see Chery zapata  Aurora would like a referral to psychiatrist Dr. Welch for further discussion as she is concerned that there may be more going on with her than her current providers are able to provide at [facility]    ROS:     CONSTITUTIONAL:  Positive for fatigue.   Negative for chills or fever.      CARDIOVASCULAR:  Negative for chest pain and pedal edema.      RESPIRATORY:  Negative for recent cough and dyspnea.      GASTROINTESTINAL:  Negative for abdominal pain, constipation, diarrhea, heartburn, nausea and vomiting.      PSYCHIATRIC:  Positive for crying spells, depression, feelings of stress, anhedonia, mood swings,  difficulty concentrating, sadness, sleep disturbance and suicidal thoughts ( fleeting thoughts, no plan ).   Negative for anxiety.          Past Medical History / Family History / Social History:         Last Reviewed on 2019 10:57 PM by Gabbie Barnard    Past Medical History:             PREVENTIVE HEALTH MAINTENANCE             DENTAL CLEANING: was last done - [place]     EYE EXAM: was last done been some time     MAMMOGRAM: Done within last 2 years and results in are chart was last done 2020 with normal results     PAP SMEAR: hysterectomy         Surgical History:         Hysterectomy: stage III dysplasia;         Family History:         Positive for Coronary Artery Disease ( pat. GM ) and Myocardial Infarction ( mat. GF ).      Positive for Type 2 Diabetes ( pat. GM ).  Father:  at age 63; Cause of death was pancreatic cacner     Mother: Healthy     Son(s): Healthy; 2 son(s) total     Daughter(s): 1 daughter(s) total; 1 ;  pneumonia/ at 17         Social History:     Occupation: [employer] supervisor     Marital Status:      Children: 3 children         Tobacco/Alcohol/Supplements:     Last Reviewed on 2020 09:40 AM by Veena Osborne    Tobacco: Current Smoker: She currently smokes some days, 1/2 pack per day.      Caffeine:  She admits to consuming caffeine via coffee ( 2 servings per day ) and tea ( 2 servings per day ).          Substance Abuse History:     Last Reviewed on 2019 10:57 PM by Gabbie Barnard    NEGATIVE         Mental Health History:     Last Reviewed on 2019 10:57 PM by Gabbie Barnard        Generalized Anxiety Disorder    Posttraumatic Stress Disorder ( from loss of her daughter in  )     Major Depression         Communicable Diseases (eg STDs):     Last Reviewed on 2019 10:57 PM by Gabbie Barnard        Current Problems:     Last Reviewed on 2020 09:47 PM by Gabbie Barnard    Hypothyroidism, unspecified     Dysthymic disorder    Vitamin D deficiency, unspecified    Polycystic ovarian syndrome    Pure hyperglyceridemia    Anogenital (venereal) warts    Mixed hyperlipidemia    Nicotine dependence, unspecified, uncomplicated    Major depressive disorder, recurrent severe without psychotic features    Generalized anxiety disorder    Strain of unspecified muscle, fascia and tendon at shoulder and upper arm level, left arm, initial encounter    Pain in right knee    Pain in left knee    Adjustment disorder with depressed mood        Immunizations:     Fluzone Quadrivalent (3+ years) 10/30/2017    PPD 1/30/2007        Allergies:     Last Reviewed on 12/09/2020 09:40 AM by Veena Osborne    Effexor: palpitations     Prozac:          Current Medications:     Last Reviewed on 12/09/2020 09:42 AM by Veena Osborne    TylenoL 325 mg oral tablet [take 1 - 2 tablets (325 - 650 mg) by oral route every 4-6 hours as needed]    buPROPion  mg oral Tablet, Extended Release 24 hr [take 1 tablet (450 mg) by oral route once daily swallowing whole. Do not crush, chew and/or divide.]    busPIRone 7.5 mg oral tablet [take 1 tablet (7.5 mg) by oral route 2 times per day prn ]    lamoTRIgine 100 mg oral tablet [take 1 tablet (100 mg) by oral route at night ]    Synthroid 125 mcg oral tablet [1 tab daily]    metFORMIN 750 mg oral Tablet, Extended Release 24 hr [1 tab daily]    norethindrone (contraceptive) 0.35 mg oral tablet [Take 1 tablet(s) by mouth daily as directed.]    atorvastatin 40 mg oral tablet [take 1 tablet (40 mg) by oral route once daily @ HS]    diclofenac sodium 75 mg oral tablet, delayed release (enteric coated) [take 1 tablet (75 mg) by oral route 2 times per day]        Objective:        Vitals:         Current: 1/4/2021 8:48:45 AM    Ht:  5 ft, 6.5 in;  Wt: 264.6 lbs;  BMI: 42.1T: 96.9 F (temporal);  BP: 143/85 mm Hg (left arm, sitting);  P: 104 bpm (left arm (BP Cuff), sitting);  sCr: 0.91 mg/dL;  GFR: 105.15         Exams:     PHYSICAL EXAM:     GENERAL: vital signs recorded - well developed, well nourished;  no apparent distress;     RESPIRATORY: normal appearance and symmetric expansion of chest wall; normal respiratory rate and pattern with no distress; normal breath sounds with no rales, rhonchi, wheezes or rubs;     CARDIOVASCULAR: normal rate; rhythm is regular;  no edema;     MUSCULOSKELETAL: normal gait; normal range of motion of all major muscle groups; no limb or joint pain with range of motion;     NEUROLOGIC: mental status: alert and oriented x 3;     PSYCHIATRIC: affect/demeanor: depressed, flat;  normal speech pattern; normal thought and perception;         Assessment:         F33.2   Major depressive disorder, recurrent severe without psychotic features       F43.21   Adjustment disorder with depressed mood       E28.2   Polycystic ovarian syndrome           ORDERS:         Meds Prescribed:       [New Rx] metFORMIN 500 mg oral Tablet, Extended Release 24 hr [take 1 tablet (500 mg) by oral route twice daily], #60 (sixty) tablets, Refills: 2 (two)         Procedures Ordered:       REFER  Referral to Specialist or Other Facility  (Send-Out)                      Plan:         Major depressive disorder, recurrent severe without psychotic featuresAurora will return to work tomorrow, but we have discussed intermittent FMLA for her as well.  I would recommend that she take 8 days per month x 3 months to cover her counseling/ appts  and will reevaluate at that time        REFERRALS:  Referral initiated to a psychiatrist and a psychologist ( Dr. Dr Welch ).            Orders:       REFER  Referral to Specialist or Other Facility  (Send-Out)              Adjustment disorder with depressed moodAurora still struggles with the loss of her daughter in 2014 from pneumonia - from the guilt - she has been unable to fully get through this. She has been undergoing counseling for some time with [facility] and is currently seeing [place]  BAR[facility] there as well and has had some medication adjustments.  She is concerned that she may need more so we will refer her to Psychiatris Dr. Welch for further evaluation         Polycystic ovarian syndromeDue to weight gain, will try to increase her metformin and see if this helps  I have discussed that often , medications can sometimes cause the weight gain as well as currently unable to participate in her normal day to day activities          Prescriptions:       [New Rx] metFORMIN 500 mg oral Tablet, Extended Release 24 hr [take 1 tablet (500 mg) by oral route twice daily], #60 (sixty) tablets, Refills: 2 (two)             Charge Capture:         Primary Diagnosis:     F33.2  Major depressive disorder, recurrent severe without psychotic features           Orders:      15953  Office/outpatient visit; established patient, level 4  (In-House)              F43.21  Adjustment disorder with depressed mood     E28.2  Polycystic ovarian syndrome

## 2021-05-18 NOTE — PROGRESS NOTES
Aurora Mendez  1980     Office/Outpatient Visit    Visit Date: Wed, Feb 17, 2021 03:45 pm    Provider: Gabbie Barnard N.P. (Assistant: Veena Osborne MA)    Location: [facility]        Electronically signed by Gabbie Barnard N.P. on  02/18/2021 11:18:24 PM                             Subjective:        CC: [phone] phone call Ms. Mendez is a 40 year old White female.  This is a follow-up visit.          HPI:           Chest pain, unspecified noted.  The discomfort is located primarily in the left anterior chest.  There is no radiation.  The pain initially began weeks ago.  Typically, individual episodes of chest pain are variable in duration.  She characterizes the pain as mild in intensity and mild to moderate in intensity.  There are no identifiable aggravating factors.  Associated symptoms include anxiety, tingling, rapid heart beat, palpitations and dyspnea.  She denies associated clammy sensation, nausea or diaphoresis.  The patient gives a history of prior chest discomfort symptoms not similar to the symptoms described today.  Cardiac risk factors include a family history of coronary artery disease.  Pertinent medical history is positive for gastroesophageal reflux, anxiety disorder, gastritis and panic attacks.            Additionally, she presents with history of rash and other nonspecific skin eruption.  this has been a problem for the past several weeks.  The rash has not occurred previously.  It is located primarily inguinal region and under breasts,  bilateral axilla.  She describes the rash as pink and foul odor under arms, moist.  She denies any associated symptoms.            Has been in quarantine for 2 weeks (today last day) since son diagnosed with covid.  She has been completely symptom free           Concerning dysthymic disorder, her anxiety disorder was originally diagnosed many years ago.  Aurora reports that she is doing much better.  She is back to work and  still has intremittent FMLA to cover her appts from time to time.  She is still speaking with counselor every 2 weeks now.  She is stable on her medications     ROS:     CONSTITUTIONAL:  Negative for chills, fatigue and fever.      CARDIOVASCULAR:  Positive for chest pain ( unrelated to exertion ) and palpitations.   Negative for pedal edema.      RESPIRATORY:  Negative for recent cough and dyspnea.      MUSCULOSKELETAL:  Negative for arthralgias and myalgias.      INTEGUMENTARY/BREAST:  Positive for rash.   Negative for pruritis.      PSYCHIATRIC:  Positive for anxiety, geronimo depression ( (stable) ) and feelings of stress ( (stable) ).   Negative for sleep disturbance or suicidal thoughts.          Past Medical History / Family History / Social History:         Last Reviewed on 2021 04:52 PM by Ginger Contreras    Past Medical History:             PREVENTIVE HEALTH MAINTENANCE             DENTAL CLEANING: was last done - [place]     EYE EXAM: was last done been some time     MAMMOGRAM: Done within last 2 years and results in are chart was last done 2020 with normal results     PAP SMEAR: hysterectomy         Surgical History:         Hysterectomy: stage III dysplasia;         Family History:         Positive for Coronary Artery Disease ( pat. GM ) and Myocardial Infarction ( mat. GF ).      Positive for Type 2 Diabetes ( pat. GM ).  Father:  at age 63; Cause of death was pancreatic cacner     Mother: Healthy     Son(s): Healthy; 2 son(s) total     Daughter(s): 1 daughter(s) total; 1 ;  pneumonia/ at 17         Social History:     Occupation: [employer] supervisor     Marital Status:      Children: 3 children         Tobacco/Alcohol/Supplements:     Last Reviewed on 2021 04:52 PM by Ginger Contreras    Tobacco: Current Smoker: She currently smokes some days, 1/2 pack per day.      Caffeine:  She admits to consuming caffeine via coffee ( 2 servings per day ) and tea ( 2 servings  per day ).          Substance Abuse History:     Last Reviewed on 2/18/2021 04:52 PM by Ginger Contreras    NEGATIVE         Mental Health History:     Last Reviewed on 2/18/2021 04:52 PM by Ginger Contreras        Generalized Anxiety Disorder    Posttraumatic Stress Disorder ( from loss of her daughter in 2014 )     Major Depression         Communicable Diseases (eg STDs):     Last Reviewed on 2/18/2021 04:52 PM by Ginger Contreras        Current Problems:     Last Reviewed on 2/18/2021 04:52 PM by Ginger Contreras    Hypothyroidism, unspecified    Dysthymic disorder    Vitamin D deficiency, unspecified    Polycystic ovarian syndrome    Pure hyperglyceridemia    Anogenital (venereal) warts    Mixed hyperlipidemia    Nicotine dependence, unspecified, uncomplicated    Contact with and (suspected) exposure to other viral communicable diseases    Major depressive disorder, recurrent severe without psychotic features    Generalized anxiety disorder    Strain of unspecified muscle, fascia and tendon at shoulder and upper arm level, left arm, initial encounter    Pain in right knee    Pain in left knee    Adjustment disorder with depressed mood    Psychophysiologic insomnia    Chest pain, unspecified    Contact with and (suspected) exposure to COVID-19    Candidiasis of skin and nail        Immunizations:     Fluzone Quadrivalent (3+ years) 10/30/2017    PPD 1/30/2007        Allergies:     Last Reviewed on 2/18/2021 04:52 PM by Ginger Contreras    Effexor: palpitations     Prozac:          Current Medications:     Last Reviewed on 2/18/2021 04:52 PM by Ginger Contreras    buPROPion  mg oral Tablet, Extended Release 24 hr [take 1 tablet (450 mg) by oral route once daily swallowing whole. Do not crush, chew and/or divide.]    lamoTRIgine 100 mg oral tablet [take 1 tablet (100 mg) by oral route at night ]    TylenoL 325 mg oral tablet [take 1 - 2 tablets (325 - 650 mg) by oral route every 4-6 hours as needed]    Synthroid  125 mcg oral tablet [1 tab daily]    norethindrone (contraceptive) 0.35 mg oral tablet [Take 1 tablet(s) by mouth daily as directed.]    atorvastatin 40 mg oral tablet [take 1 tablet (40 mg) by oral route once daily @ HS]    busPIRone 10 mg oral tablet    metFORMIN 500 mg oral Tablet, Extended Release 24 hr [take 1 tablet (500 mg) by oral route twice daily]    QUEtiapine 25 mg oral tablet [take 1/2-1 tablet by oral route prn ]        Assessment:         R07.9   Chest pain, unspecified       B37.2   Candidiasis of skin and nail       Z20.822   Contact with and (suspected) exposure to COVID-19       F34.1   Dysthymic disorder           ORDERS:         Meds Prescribed:       [New Rx] Desenex 2 % Topical Powder [apply to the affected area(s) by topical route 2 times per day in the morning and evening], #85 (eighty five) grams, Refills: 0 (zero)       [New Rx] ketoconazole 2 % Topical Cream [apply to the affected area(s) by topical route 2 times per day apply after visible rash gone x 1 week], #30 (thirty) grams, Refills: 0 (zero)       [New Rx] cephALEXin 500 mg oral capsule [take 1 capsule (500 mg) by oral route TID x 5 days], #15 (fifteen) capsules, Refills: 0 (zero)         Radiology/Test Orders:       [id number]  Electrocardiogram, routine with at least 12 leads; with interpretation and report  (Send-Out)            28898  Radiologic exam chest 2 views  (Send-Out)                      Plan:         Chest pain, unspecifiedI have instructed Aurora to go to ER when chest pain is occurring.  (not present now) but to stop by the office in the morning so we can get a baseline EKG to see if any changes.          TESTS/PROCEDURES:  Will proceed with an ECG to be performed/scheduled now.  Telehealth: Verbal consent obtained for visit to occur via phone call; Staff, other than provider, present during telephone visit include only patient and provider; Total time spent was 9 minutes; 28999--Ejwhwhryy E/M 5-10 minutes     FOLLOW-UP  TESTING #1:    RADIOLOGY:  I have ordered a chest x-ray (PA and lateral) to be done today.            Orders:       [id number]  Electrocardiogram, routine with at least 12 leads; with interpretation and report  (Send-Out)            04877  Radiologic exam chest 2 views  (Send-Out)              Candidiasis of skin and nail          Prescriptions:       [New Rx] Desenex 2 % Topical Powder [apply to the affected area(s) by topical route 2 times per day in the morning and evening], #85 (eighty five) grams, Refills: 0 (zero)       [New Rx] ketoconazole 2 % Topical Cream [apply to the affected area(s) by topical route 2 times per day apply after visible rash gone x 1 week], #30 (thirty) grams, Refills: 0 (zero)       [New Rx] cephALEXin 500 mg oral capsule [take 1 capsule (500 mg) by oral route TID x 5 days], #15 (fifteen) capsules, Refills: 0 (zero)         Contact with and (suspected) exposure to COVID-19follow up should she develop symptoms        Dysthymic disordercontinue current medications and follow up with [facility] as recommended            Charge Capture:         Primary Diagnosis:     R07.9  Chest pain, unspecified           Orders:      [id number]  Phys/QHP telephone evaluation 5-10 min  (In-House)              B37.2  Candidiasis of skin and nail     Z20.822  Contact with and (suspected) exposure to COVID-19     F34.1  Dysthymic disorder         ADDENDUMS:      ____________________________________    Addendum: 02/22/2021 01:05 PM - One, Team         Visit Note Faxed to:        Nader Duran  (Cardiology); Number [phone]

## 2021-05-18 NOTE — PROGRESS NOTES
Aurora Mendez  1980     Office/Outpatient Visit    Visit Date: Mon, Mar 9, 2020 03:15 pm    Provider: Gabbie Barnard N.P. (Assistant: Spurling, Sarah C, MA)    Location: [facility]        Electronically signed by Gabbie Barnard N.P. on  03/09/2020 04:04:24 PM                             Subjective:        CC: Ms. Mendez is a 39 year old White female.  She presents with sore throat.  Sore throat;         HPI:           She complains of a sore throat.  This has been noted for the past 3 days.  The discomfort occurs constantly.  Associated symptoms include chest congestion, chills and nausea.  She denies cough, fever or nasal congestion.  She reports recent exposure to illness from family members.  She has already tried to relieve the symptoms with taking byquil  tylenol for pain.  Pertinent past medical history is unremarkable.  She did go to ER yesterday to rule out the flu - was told viral  but they did not check her throat - but reports today, that now her throat is 'killing her'    ROS:     CONSTITUTIONAL:  Positive for chills, fatigue and fever.      E/N/T:  Positive for nasal congestion, sinus pressure, oral sores/ulcers and sore throat.      CARDIOVASCULAR:  Negative for chest pain, orthopnea, paroxysmal nocturnal dyspnea and pedal edema.      RESPIRATORY:  Negative for recent cough and dyspnea.      GASTROINTESTINAL:  Negative for abdominal pain, constipation, diarrhea, nausea and vomiting.      NEUROLOGICAL:  Negative for dizziness, headaches, paresthesias, and weakness.          Past Medical History / Family History / Social History:         Last Reviewed on 12/22/2019 10:57 PM by Gabbie Barnard    Past Medical History:             PREVENTIVE HEALTH MAINTENANCE             DENTAL CLEANING: was last done 2019- [place]     EYE EXAM: was last done been some time     MAMMOGRAM: Done within last 2 years and results in are chart was last done 1- with normal results     PAP  SMEAR: hysterectomy         Surgical History:         Hysterectomy: stage III dysplasia;         Family History:         Positive for Coronary Artery Disease ( pat. GM ) and Myocardial Infarction ( mat. GF ).      Positive for Type 2 Diabetes ( pat. GM ).  Father:  at age 63; Cause of death was pancreatic cacner     Mother: Healthy     Son(s): Healthy; 2 son(s) total     Daughter(s): 1 daughter(s) total; 1 ;  pneumonia/ at 17         Social History:     Occupation: [employer] supervisor     Marital Status:      Children: 3 children         Tobacco/Alcohol/Supplements:     Last Reviewed on 2019 10:57 PM by Gabbie Barnard    Tobacco: Current Smoker: She currently smokes some days, 1/2 pack per day.      Caffeine:  She admits to consuming caffeine via coffee ( 2 servings per day ) and tea ( 2 servings per day ).          Substance Abuse History:     Last Reviewed on 2019 10:57 PM by Gabbie Barnard    NEGATIVE         Mental Health History:     Last Reviewed on 2019 10:57 PM by Gabbie Barnard        Communicable Diseases (eg STDs):     Last Reviewed on 2019 10:57 PM by Gabbie Barnard        Current Problems:     Last Reviewed on 3/09/2020 03:15 PM by Spurling, Sarah C    Hypothyroidism, unspecified    Dysthymic disorder    Vitamin D deficiency, unspecified    Polycystic ovarian syndrome    Pure hyperglyceridemia    Anogenital (venereal) warts    Mixed hyperlipidemia    Subacute and chronic vaginitis    Encounter for general adult medical examination without abnormal findings    Encounter for screening mammogram for malignant neoplasm of breast    Inconclusive mammogram        Immunizations:     Fluzone Quadrivalent (3+ years) 10/30/2017    PPD 2007        Allergies:     Last Reviewed on 3/09/2020 03:15 PM by Spurling, Sarah C    Effexor: palpitations     Prozac:          Current Medications:     Last Reviewed on 3/09/2020 03:18 PM by Spurling, Sarah C     Synthroid 0.125mg Tablet [1 tab daily]    metFORMIN 750 mg oral Tablet, Extended Release 24 hr [1 tab daily]    buPROPion HCl 150 mg oral Tablet, Extended Release 24 hr [take 1 tablet (150 mg) by oral route once daily]    atorvastatin 40 mg oral tablet [take 1 tablet (40 mg) by oral route once daily @ HS]        Objective:        Vitals:         Current: 3/9/2020 3:22:53 PM    Ht:  5 ft, 6.5 in;  Wt: 246.2 lbs;  BMI: 39.1T: 98.4 F (oral);  BP: 130/73 mm Hg (left arm, sitting);  P: 105 bpm (left arm (BP Cuff), sitting);  sCr: 0.75 mg/dL;  GFR: 124.95        Exams:     PHYSICAL EXAM:     GENERAL: vital signs recorded - well developed, well nourished;  no apparent distress, diaphoretic, appears moderately ill;     E/N/T: EARS: external auditory canal edematous;  OROPHARYNX: posterior pharynx shows a posterior pharyngeal exudate, erythema, and white patches bilaterally and midline;     NECK: range of motion is normal; thyroid is non-palpable;     RESPIRATORY: normal respiratory rate and pattern with no distress; normal breath sounds with no rales, rhonchi, wheezes or rubs;     CARDIOVASCULAR: normal rate; rhythm is regular;  no systolic murmur; no edema;     GASTROINTESTINAL: nontender; normal bowel sounds; no organomegaly;     LYMPHATIC: bilateral submandibular nodes ( tender );     MUSCULOSKELETAL: normal gait; normal range of motion of all major muscle groups; no limb or joint pain with range of motion;     NEUROLOGICAL:  cranial nerves, motor and sensory function, reflexes, gait and coordination are all intact;     PSYCHIATRIC:  appropriate affect and demeanor; normal speech pattern; grossly normal memory;         Lab/Test Results:         Rapid Strep Screen: Negative (03/09/2020),     Performed by:: KATI (03/09/2020),             Assessment:         J02.0   Streptococcal pharyngitis       Z13.31   Encounter for screening for depression       F17.200   Nicotine dependence, unspecified, uncomplicated           ORDERS:          Meds Prescribed:       [New Rx] Augmentin 875-125 mg oral tablet [take 1 tablet by oral route every 12 hours x 10 days], #20 (twenty) tablets, Refills: 0 (zero)       [New Rx] Lidocaine Viscous 2 % Mucous Membrane Solution [take 15 milliliters and gargle and spit out by oral route every 4-6 hours PRN], #120 (one hundred and twenty) milliliters, Refills: 1 (one)         Lab Orders:       [id number]  Group A Streptococcus detection by immunoassay with direct optical observation  (In-House)            80543  [facility] Throat culture, strep  (Send-Out)              Other Orders:         Depression screen positive and follow up plan documented  (In-House)              Smoking and Tobacco Cessation 3 to 10 minutes  (In-House)                      Plan:         Streptococcal pharyngitistreating based on symptoms today - consider alto too viral in nature but will treat pending the culture and instructed that if she worsens, will need to get back in for further evaluation          Prescriptions:       [New Rx] Augmentin 875-125 mg oral tablet [take 1 tablet by oral route every 12 hours x 10 days], #20 (twenty) tablets, Refills: 0 (zero)       [New Rx] Lidocaine Viscous 2 % Mucous Membrane Solution [take 15 milliliters and gargle and spit out by oral route every 4-6 hours PRN], #120 (one hundred and twenty) milliliters, Refills: 1 (one)           Orders:       [id number]  Group A Streptococcus detection by immunoassay with direct optical observation  (In-House)            [id number]  [facility] Throat culture, strep  (Send-Out)              Encounter for screening for depression    MIPS PHQ-9 Depression Screening: Completed form scanned and in chart; Total Score 17 Positive Depression Screen: Stable on medications. No suicidal ideation.            Orders:         Depression screen positive and follow up plan documented  (In-House)              Nicotine dependence, unspecified, uncomplicateddiscussed options for  smoking - she has quit since this started - she will follow up if desire for medication assistance        RECOMMENDATIONS given include: Counseled on smoking cessation and advised of the benefits to patient's health if she were to stop smoking. Counseling for 3 to 10 minutes.  School/Work Excuse for Today Tomorrow           Orders:         Smoking and Tobacco Cessation 3 to 10 minutes  (In-House)                  Patient Recommendations:        For  Nicotine dependence, unspecified, uncomplicated:        Stop smoking.              Charge Capture:         Primary Diagnosis:     J02.0  Streptococcal pharyngitis           Orders:      10977  Office/outpatient visit; established patient, level 3  (In-House)            57710  Group A Streptococcus detection by immunoassay with direct optical observation  (In-House)              Z13.31  Encounter for screening for depression           Orders:        Depression screen positive and follow up plan documented  (In-House)              F17.200  Nicotine dependence, unspecified, uncomplicated           Orders:        Smoking and Tobacco Cessation 3 to 10 minutes  (In-House)

## 2021-05-18 NOTE — PROGRESS NOTES
Aurora Mednez  1980     Office/Outpatient Visit    Visit Date: Wed, Aug 12, 2020 08:47 am    Provider: Gabbie Barnard N.P. (Assistant: Veena Osborne MA)    Location: [facility]        Electronically signed by Gabbie Barnard N.P. on  08/12/2020 11:22:58 AM                             Subjective:        CC: Ms. Mendez is a 39 year old White female.  sore throat, headache, body aches, started monday; phone [phone]         HPI:           Ms. Mendez presents with acute upper respiratory infection, unspecified.  These have been present for the past one to two days.  The symptoms include body aches, Chills, cough, headache, nasal discharge and sore throat.  She denies chest congestion, ear complaints or fever.  She denies exposure to ill contacts.  She has already tried to relieve the symptoms with nyquil  tylenol.      ROS:     CONSTITUTIONAL:  Positive for chills.   Negative for fever.      E/N/T:  Positive for sore throat ( acute; This is her main complaint ).      CARDIOVASCULAR:  Negative for chest pain and pedal edema.      RESPIRATORY:  Negative for recent cough and dyspnea.      GASTROINTESTINAL:  Negative for abdominal pain, diarrhea, nausea and vomiting.      MUSCULOSKELETAL:  Positive for myalgias.      NEUROLOGICAL:  Positive for headaches.      ALLERGIC/IMMUNOLOGIC:  Positive for seasonal allergies.          Past Medical History / Family History / Social History:         Last Reviewed on 12/22/2019 10:57 PM by Gabbie Barnard    Past Medical History:             PREVENTIVE HEALTH MAINTENANCE             DENTAL CLEANING: was last done 2019- [place]     EYE EXAM: was last done been some time     MAMMOGRAM: Done within last 2 years and results in are chart was last done 1- with normal results     PAP SMEAR: hysterectomy         Surgical History:         Hysterectomy: stage III dysplasia;         Family History:         Positive for Coronary Artery Disease ( pat. GM )  and Myocardial Infarction ( mat. GF ).      Positive for Type 2 Diabetes ( pat. GM ).  Father:  at age 63; Cause of death was pancreatic cacner     Mother: Healthy     Son(s): Healthy; 2 son(s) total     Daughter(s): 1 daughter(s) total; 1 ;  pneumonia/ at 17         Social History:     Occupation: [employer] supervisor     Marital Status:      Children: 3 children         Tobacco/Alcohol/Supplements:     Last Reviewed on 3/31/2020 11:58 AM by Spurling, Sarah C    Tobacco: Current Smoker: She currently smokes some days, 1/2 pack per day.      Caffeine:  She admits to consuming caffeine via coffee ( 2 servings per day ) and tea ( 2 servings per day ).          Substance Abuse History:     Last Reviewed on 2019 10:57 PM by Gabbie Barnard    NEGATIVE         Mental Health History:     Last Reviewed on 2019 10:57 PM by Gabbie Barnard        Communicable Diseases (eg STDs):     Last Reviewed on 2019 10:57 PM by Gabbie Barnard        Current Problems:     Last Reviewed on 3/31/2020 11:58 AM by Spurling, Sarah C    Dysthymic disorder    Hypothyroidism, unspecified    Vitamin D deficiency, unspecified    Polycystic ovarian syndrome    Pure hyperglyceridemia    Anogenital (venereal) warts    Mixed hyperlipidemia    Encounter for general adult medical examination without abnormal findings    Encounter for screening mammogram for malignant neoplasm of breast    Inconclusive mammogram    Encounter for screening for depression    Nicotine dependence, unspecified, uncomplicated    Streptococcal pharyngitis    Cough    Headache        Immunizations:     Fluzone Quadrivalent (3+ years) 10/30/2017    PPD 2007        Allergies:     Last Reviewed on 2020 08:48 AM by Veena Osborne    Effexor: palpitations     Prozac:          Current Medications:     Last Reviewed on 2020 08:49 AM by Veena Osborne    TylenoL 325 mg oral tablet [take 1 - 2 tablets (325 - 650 mg) by oral route  every 4-6 hours as needed]    Birth control pill  [1 daily po]    Synthroid 125 mcg oral tablet [1 tab daily]    metFORMIN 750 mg oral Tablet, Extended Release 24 hr [1 tab daily]    buPROPion  mg oral Tablet, Extended Release 24 hr [take 1 tablet (150 mg) by oral route once daily]    atorvastatin 40 mg oral tablet [take 1 tablet (40 mg) by oral route once daily @ HS]    fluticasone propionate 50 mcg/actuation Intranasal Spray, Suspension [inhale 1 spray (50 mcg) in each nostril by intranasal route 1 times per day]        Objective:        Lab/Test Results:         Rapid Strep Screen: Negative (08/12/2020),     Performed by:: moe (08/12/2020),             Assessment:         J06.9   Acute upper respiratory infection, unspecified           ORDERS:         Lab Orders:       [id number]  [facility] - [facility] Rapid strep A  (In-House)            [id number]  [facility] Throat culture, strep  (Send-Out)                      Plan:         Acute upper respiratory infection, unspecifiedI have advised her to have a covid test - she declines stating that she has no fever and no cough - she will contact our office if she decides that she is not improving and would like to have this doneWarm salt gargles, throat lozenges and ibuprofen.  Follow up if no improvement or worsening    LABORATORY:  Labs ordered to be performed today include rapid strep test.  Telehealth: Verbal consent obtained for visit to occur via phone call; Staff, other than provider, present during telephone visit include only patient and provider; Total time spent was 8 minutes; 31008--Qbvapanhk E/M 5-10 minutes           Orders:       [id number]  [facility] - [facility] Rapid strep A  (In-House)            15940  [facility] Throat culture, strep  (Send-Out)                  Charge Capture:         Primary Diagnosis:     J06.9  Acute upper respiratory infection, unspecified           Orders:      [id number]  [facility] - [facility] Rapid strep A  (In-House)            [id number]  [facility]\"" telephone evaluation  5-10 min  (In-House)

## 2021-05-18 NOTE — PROGRESS NOTES
Aurora Mendez  1980     Office/Outpatient Visit    Visit Date: Wed, Dec 9, 2020 09:37 am    Provider: Gabbie Barnard N.P. (Assistant: Veena Osborne MA)    Location: [facility]        Electronically signed by Gabbie Barnard N.P. on  12/09/2020 10:01:51 PM                             Subjective:        CC: Ms. Mendez is a 39 year old White female.  This is a follow-up visit.  pt states she is not taking diclofenac         HPI:           Complaint of dysthymic disorder..  weekly visit with Chery, counselor   - at [facility], she is also seeing Mohini at [facility] for medication management.  Scheduled to see her on 12/24 but attempting to get in sooner.  Recently had an increase in dose of Lamictal from 25mg to 100mg - feels like she is calmer, but can not get her mind to stop dwelling , difficult time getting herself back in a routineDx with dysthymic disorder; continuing symptoms, not feeling like she is getting better.  The diagnosis of depression was made several years ago.  Presently, she admits to severe depression.  This episode of depression has been present for the past several months.  Current medications include an antidepressant, an anxiolytic, and mood stabilizer.  Current affective symptoms include anhedonia, anxious mood, a change in appetite, hypersomnia,  crying spells, decreased ability to concentrate, fatigue, guilt, sadness, feelings of worthlessness, feeling hopeless and Anger.  Other symptoms that the patient has been experiencing include palpitations, slowed movements, and weakness.  Presently, Ms. Mendez admits to fleeting thoughts of suicide ( wishes she were not here , but knows that her son needs her; she denies a suicide plan and is able to contract with me ).  She is worried since she feels that she has always in the past been able to pull out of her depression, but this time has been going on for some time.  Her depression stems from the grief she feels over the  loss of her daughter to pneumonia at the age of 17 in      ROS:     CONSTITUTIONAL:  Positive for fatigue ( severe ).   Negative for chills or fever.      CARDIOVASCULAR:  Negative for chest pain and pedal edema.      RESPIRATORY:  Negative for recent cough and dyspnea.      PSYCHIATRIC:  Positive for anxiety ( (improved on lamictal , but still present) ), crying spells, depression, feelings of stress, anhedonia, difficulty concentrating, sadness, hypersomnia and suicidal thoughts ( fleeting thoughts, no plan ).          Past Medical History / Family History / Social History:         Last Reviewed on 2019 10:57 PM by Gabbie Barnard    Past Medical History:             PREVENTIVE HEALTH MAINTENANCE             DENTAL CLEANING: was last done - [place]     EYE EXAM: was last done been some time     MAMMOGRAM: Done within last 2 years and results in are chart was last done 2020 with normal results     PAP SMEAR: hysterectomy         Surgical History:         Hysterectomy: stage III dysplasia;         Family History:         Positive for Coronary Artery Disease ( pat. GM ) and Myocardial Infarction ( mat. GF ).      Positive for Type 2 Diabetes ( pat. GM ).  Father:  at age 63; Cause of death was pancreatic cacner     Mother: Healthy     Son(s): Healthy; 2 son(s) total     Daughter(s): 1 daughter(s) total; 1 ;  pneumonia/ at 17         Social History:     Occupation: [employer] supervisor     Marital Status:      Children: 3 children         Tobacco/Alcohol/Supplements:     Last Reviewed on 2020 09:40 AM by Veena Osborne    Tobacco: Current Smoker: She currently smokes some days, 1/2 pack per day.      Caffeine:  She admits to consuming caffeine via coffee ( 2 servings per day ) and tea ( 2 servings per day ).          Substance Abuse History:     Last Reviewed on 2019 10:57 PM by Gabbie Barnard    NEGATIVE         Mental Health History:     Last Reviewed on  12/22/2019 10:57 PM by Gabbie Barnard        Generalized Anxiety Disorder    Posttraumatic Stress Disorder ( from loss of her daughter in 2014 )     Major Depression         Communicable Diseases (eg STDs):     Last Reviewed on 12/22/2019 10:57 PM by Gabbie Barnard        Current Problems:     Last Reviewed on 12/09/2020 09:47 PM by Gabbie Barnard    Dysthymic disorder    Hypothyroidism, unspecified    Vitamin D deficiency, unspecified    Polycystic ovarian syndrome    Pure hyperglyceridemia    Anogenital (venereal) warts    Mixed hyperlipidemia    Nicotine dependence, unspecified, uncomplicated    Major depressive disorder, recurrent severe without psychotic features    Generalized anxiety disorder    Strain of unspecified muscle, fascia and tendon at shoulder and upper arm level, left arm, initial encounter    Pain in right knee    Pain in left knee        Immunizations:     Fluzone Quadrivalent (3+ years) 10/30/2017    PPD 1/30/2007        Allergies:     Last Reviewed on 12/09/2020 09:40 AM by Veena Osborne    Effexor: palpitations     Prozac:          Current Medications:     Last Reviewed on 12/09/2020 09:42 AM by Veena Osborne    TylenoL 325 mg oral tablet [take 1 - 2 tablets (325 - 650 mg) by oral route every 4-6 hours as needed]    Synthroid 125 mcg oral tablet [1 tab daily]    metFORMIN 750 mg oral Tablet, Extended Release 24 hr [1 tab daily]    norethindrone (contraceptive) 0.35 mg oral tablet [Take 1 tablet(s) by mouth daily as directed.]    atorvastatin 40 mg oral tablet [take 1 tablet (40 mg) by oral route once daily @ HS]    diclofenac sodium 75 mg oral tablet, delayed release (enteric coated) [take 1 tablet (75 mg) by oral route 2 times per day]    buPROPion  mg oral Tablet, Extended Release 24 hr [take 1 tablet (450 mg) by oral route once daily swallowing whole. Do not crush, chew and/or divide.]    busPIRone 7.5 mg oral tablet [take 1 tablet (7.5 mg) by oral route 2 times per day prn  ]    lamoTRIgine 100 mg oral tablet [take 1 tablet (100 mg) by oral route at night ]        Objective:        Vitals:         Current: 12/9/2020 9:43:12 AM    Ht:  5 ft, 6.5 in;  Wt: 262 lbs;  BMI: 41.7T: 97.5 F (temporal);  BP: 132/87 mm Hg (left arm, sitting);  P: 89 bpm (left arm (BP Cuff), sitting);  sCr: 0.91 mg/dL;  GFR: 105.74        Exams:     PHYSICAL EXAM:     GENERAL: vital signs recorded - well developed, well nourished;  anxious, tearful;     RESPIRATORY: normal appearance and symmetric expansion of chest wall; normal respiratory rate and pattern with no distress; normal breath sounds with no rales, rhonchi, wheezes or rubs;     CARDIOVASCULAR: normal rate; rhythm is regular;  no edema;     MUSCULOSKELETAL: normal gait; normal range of motion of all major muscle groups; no limb or joint pain with range of motion;     NEUROLOGIC: mental status: alert and oriented x 3;     PSYCHIATRIC: affect/demeanor: depressed, tearful;  speech pattern: slowed;  normal thought and perception;         Assessment:         F34.1   Dysthymic disorder       F33.2   Major depressive disorder, recurrent severe without psychotic features       F43.21   Adjustment disorder with depressed mood           Plan: I have spent more than 30 min face to face counseling with patient today         Dysthymic disordercontinued follow up with [facility] as recommended         Major depressive disorder, recurrent severe without psychotic featuresI have encouraged Ms. Mendez to make a written list of goals for this week - measurable goals.  She will make one of her goals to get out of bed, out of pajamas every day-  get dressed, shoes  on and get herself back in a routine for her preparation of returning to work on January 5th.           Adjustment disorder with depressed moodMs. Mendez is experiencing prolonged grief over the loss of her daughter 6 years ago. She is finding this very difficult to move past.  I have highly recommended that she  reach out to a grief support group, try to find a point of contact that she may be able to reach out to on the phone, as she is finding it difficult to have face to face right now.  I have encouraged her to see if there are zoom meetings, etc that she may be able to be a bystander and observe a few sessions before she feels comfortable talking.  She will discuss this further with Chery at her next session             Charge Capture:         Primary Diagnosis:     F34.1  Dysthymic disorder           Orders:      [id number]  Office/outpatient visit; established patient, level 4 (32 minutes)  (In-House)              F33.2  Major depressive disorder, recurrent severe without psychotic features     F43.21  Adjustment disorder with depressed mood

## 2021-05-18 NOTE — PROGRESS NOTES
Aurora Mendez 1980     Office/Outpatient Visit    Visit Date: Fri, Aug 10, 2018 10:52 am    Provider: Abimbola Roman N.P. (Assistant: Mercedes Pedroza MA)    Location: [facility]        Electronically signed by Abimbola Roman N.P. on  08/10/2018 12:10:27 PM                             SUBJECTIVE:        CC:     Ms. Mendez is a 37 year old White female.  Patient is here for routine check up and medication refills. She also complains of vaginal discharge.;         HPI:         Patient to be evaluated for acquired hypothyroidism.  States doing well on med. Here for f/u and refills.          PCOS: Pt states starting on metformin from Dr. Tran. Doing well on it and would like to switch to coming here as her copay is more at his office.          Vit D deficiency: Pt currently supplementing, checking level today.          Candidiasis: Pt states being on flagyl recently. Now she has a white discharge.          Ms. Mendez desires help with smoking cessation.  Pt states being on chantix in the past. She took it for 3-4 months and then continued to be quit for 4-5 months. She then started back. She would like to take chantix again. Informed she should take for 6 months.          Depression with anxiety details; states being on lexapro, she stopped the med on her own d/t gaining weight. She is now still having emotional, blake concerns and would like to be on medication.          Hypertrophy of tonsils: Pt states having large tonsils for years. They bother her and cause her to have trouble swallowing. She would like to have a consult with an ENT to see if removal is an option.      ROS:     CONSTITUTIONAL:  Negative for chills, fatigue, fever, and weight change.      EYES:  Negative for blurred vision.      CARDIOVASCULAR:  Negative for chest pain, orthopnea, paroxysmal nocturnal dyspnea and pedal edema.      RESPIRATORY:  Negative for dyspnea.      GASTROINTESTINAL:  Negative for abdominal  pain, constipation, diarrhea, nausea and vomiting.      NEUROLOGICAL:  Negative for dizziness, headaches, paresthesias, and weakness.      PSYCHIATRIC:  Positive for depression.          PMH/FMH/SH:     Last Reviewed on 8/10/2018 11:22 AM by Abimbola Roman    Past Medical History:             PREVENTIVE HEALTH MAINTENANCE             PAP SMEAR: hysterectomy         Surgical History:         Hysterectomy: stage III dysplasia;         Family History:         Positive for Coronary Artery Disease ( pat. GM ) and Myocardial Infarction ( mat. GF ).      Positive for Type 2 Diabetes ( pat. GM ).  Father:  at age 63; Cause of death was pancreatic cacner     Mother: Healthy     Son(s): Healthy; 2 son(s) total     Daughter(s): 1 daughter(s) total; 1 ;  pneumonia/ at 17         Social History:     Occupation: [employer] supervisor     Marital Status:      Children: 3 children         Tobacco/Alcohol/Supplements:     Last Reviewed on 8/10/2018 11:22 AM by Abimbola Roman    Tobacco: Current Smoker: She currently smokes every day, 1 pack per day.      Caffeine:  She admits to consuming caffeine via coffee ( 2 servings per day ) and tea ( 2 servings per day ).          Substance Abuse History:     Last Reviewed on 8/10/2018 11:22 AM by Abimbola Roman    NEGATIVE         Mental Health History:     Last Reviewed on 8/10/2018 11:22 AM by Abimbola Roman        Communicable Diseases (eg STDs):     Last Reviewed on 8/10/2018 11:22 AM by Abimbola Roman            Current Problems:     Last Reviewed on 8/10/2018 11:22 AM by Abimbola Roman    Nasal Congestion     Leukocytosis, unspecified     Plantar wart     Acquired hypothyroidism, unspecified cause     Urinary frequency     Elevated fasting glucose     History of recurrent UTI's     GERD     Vitamin D deficiency     Ovarian cyst     High-risk sexual behavior     Obstructive sleep apnea (adult) (pediatric)     Depression with anxiety     Acquired  hypothyroidism     Allergic rhinitis, unspecified cause     Nausea         Immunizations:     Fluzone Quadrivalent (3+ years) 10/30/2017     PPD 1/30/2007         Allergies:     Last Reviewed on 8/10/2018 11:22 AM by Abimbola Roman    Effexor: palpitations    Prozac:        Current Medications:     Last Reviewed on 8/10/2018 11:22 AM by Abimbola Roman    Synthroid 0.125mg Tablet 1 tab daily     Zofran 4mg Tablet 1 po q 6 hours prn     Norethindrone Acetate 0.35mg Tablet Take 1 tablet(s) by mouth daily as directed.     Metformin HCl 750mg Tablets, Extended Release 1 tab daily     OTC Calcium w/ Vitamin D3 Take one tablet once daily         OBJECTIVE:        Vitals:         Current: 8/10/2018 10:57:32 AM    Ht:  5 ft, 6.5 in;  Wt: 249 lbs;  BMI: 39.6    T: 98.5 F (oral);  BP: 133/86 mm Hg (left arm, sitting);  P: 99 bpm (left arm (BP Cuff), sitting);  sCr: 0.76 mg/dL;  GFR: 126.32        Exams:     PHYSICAL EXAM:     GENERAL: vital signs recorded - well developed, well nourished;  no apparent distress;     EYES: extraocular movements intact; conjunctiva and cornea are normal; PERRLA;     E/N/T:  normal EACs, TMs, nasal/oral mucosa, teeth, gingiva, and oropharynx;     NECK: range of motion is normal; thyroid is non-palpable;     RESPIRATORY: normal respiratory rate and pattern with no distress; normal breath sounds with no rales, rhonchi, wheezes or rubs;     CARDIOVASCULAR: normal rate; rhythm is regular;  no systolic murmur; no edema;     GASTROINTESTINAL: nontender; normal bowel sounds; no organomegaly;     NEUROLOGICAL:  cranial nerves, motor and sensory function, reflexes, gait and coordination are all intact;     PSYCHIATRIC:  appropriate affect and demeanor; normal speech pattern; grossly normal memory;         ASSESSMENT:           244.8   E03.9  Acquired hypothyroidism              DDx:     256.4   E28.2  Polycystic ovaries              DDx:     268.9   E55.9  Vitamin D deficiency              DDx:      V77.91   Z13.220  Screening for hyperlipidemia              DDx:     112.9   B37.9  Candidiasis, unspecified site              DDx:     305.1   F17.200  Tobacco use disorder              DDx:     300.4   F34.1  Depression with anxiety              DDx:     474.11   J35.1  Hypertrophy of tonsils              DDx:         ORDERS:         Meds Prescribed:       Diflucan (Fluconazole) 150mg Tablet Take 1 tablet(s) by mouth on day #1, then 1 tablet on day #3  #2 (Two) tablet(s) Refills: 0       Varenicline Tartrate 0.5mg/1mg Tablet Take as directed per package instructions.  #56 (Fifty Six) box Refills: 0       Wellbutrin SR (Bupropion HCl) 150mg Tablets, Sustained Release Take 1 tablet(s) by mouth bid  #60 (Sixty) tablet(s) Refills: 1       Refill of: Synthroid (Levothyroxine Sodium) 0.125mg Tablet 1 tab daily  #90 (Ninety) tablet(s) Refills: 1         Lab Orders:       [id number]  TSH - [facility] TSH  (Send-Out)         95750  BDCB - [facility] CBC with 3 part diff  (Send-Out)         19812  COMP - [facility] Comp. Metabolic Panel  (Send-Out)         87232  VITD - [facility] Vitamin D, 25 Hydroxy  (Send-Out)         54000  LPDP - [facility] Lipid Panel  (Send-Out)         APPTO  Appointment need  (In-House)           Procedures Ordered:       REFER  Referral to Specialist or Other Facility  (Send-Out)                   PLAN:          Acquired hypothyroidism     LABORATORY:  Labs ordered to be performed today include CBC, Comprehensive metabolic panel, and TSH.      FOLLOW-UP: Schedule a follow-up visit in 6 months..   Chronic visit follow up           Prescriptions:       Refill of: Synthroid (Levothyroxine Sodium) 0.125mg Tablet 1 tab daily  #90 (Ninety) tablet(s) Refills: 1           Orders:       [id number]  TSH - [facility] TSH  (Send-Out)         04611  BDCB - [facility] CBC with 3 part diff  (Send-Out)         27369  COMP [facility] Comp. Metabolic Panel  (Send-Out)         APPTO  Appointment need  (In-House)             Patient Education Handouts:       Hypothyroidism            Polycystic ovaries           Prescriptions: Pt call when refill needed. Will start to fill from our office.          Vitamin D deficiency           Orders:       [id number]  VITD - [facility] Vitamin D, 25 Hydroxy  (Send-Out)            Screening for hyperlipidemia           Orders:       [id number]  LPDP [facility] Lipid Panel  (Send-Out)            Candidiasis, unspecified site           Prescriptions:       Diflucan (Fluconazole) 150mg Tablet Take 1 tablet(s) by mouth on day #1, then 1 tablet on day #3  #2 (Two) tablet(s) Refills: 0          Tobacco use disorder           Prescriptions:       Varenicline Tartrate 0.5mg/1mg Tablet Take as directed per package instructions.  #56 (Fifty Six) box Refills: 0          Depression with anxiety           Prescriptions:       Wellbutrin SR (Bupropion HCl) 150mg Tablets, Sustained Release Take 1 tablet(s) by mouth bid  #60 (Sixty) tablet(s) Refills: 1          Hypertrophy of tonsils         REFERRALS:  Referral initiated to an E/N/T ( Dr. Emiliano Ortiz, [facility] ).            Orders:       REFER  Referral to Specialist or Other Facility  (Send-Out)               Other Prescriptions: Pt to call when BCP needed, will take over at this office.         Patient Recommendations:        For  Acquired hypothyroidism:     Schedule a follow-up visit in 6 months.                APPOINTMENT INFORMATION:        Monday Tuesday Wednesday Thursday Friday Saturday Sunday            Time:___________________AM  PM   Date:_____________________             CHARGE CAPTURE:           Primary Diagnosis:     244.8 Acquired hypothyroidism            E03.9    Hypothyroidism, unspecified              Orders:          [id number]   Office/outpatient visit; established patient, level 4  (In-House)             APPTO   Appointment need  (In-House)           256.4 Polycystic ovaries            E28.2    Polycystic ovarian syndrome    268.9 Vitamin D deficiency            E55.9    Vitamin D deficiency, unspecified    V77.91  Screening for hyperlipidemia            Z13.220    Encounter for screening for lipoid disorders    112.9 Candidiasis, unspecified site            B37.9    Candidiasis, unspecified    305.1 Tobacco use disorder            F17.200    Nicotine dependence, unspecified, uncomplicated    300.4 Depression with anxiety            F34.1    Dysthymic disorder    474.11 Hypertrophy of tonsils            J35.1    Hypertrophy of tonsils        ADDENDUMS:      ____________________________________    Addendum: 08/10/2018 02:56 PM - Cynthia Ibarra         Visit Note Faxed to:        Emiliano Ortiz  (Otolaryngology); Number [phone]            Addendum: 09/12/2018 10:57 AM - Carol Ann Poon         Visit Note Faxed to:        Emiliano Ortiz  (Otolaryngology); Number [phone]

## 2021-05-18 NOTE — PROGRESS NOTES
Aurora Mendez 1980     Office/Outpatient Visit    Visit Date: Thu, Aug 1, 2019 05:16 pm    Provider: Abimbola Roman N.P. (Assistant: Veena Osborne MA)    Location: [facility]        Electronically signed by Abimbola Roman N.P. on  08/05/2019 11:36:12 AM                             SUBJECTIVE:        CC:     Ms. Mendez is a 38 year old White female.  spot has come up in vaginal area;         HPI:         Patient to be evaluated for vaginitis.  Pt states being worried about a spot that she found. Has had HPV in the past with genital warts. She thinks this may be another one.          Dx with skin nodule; skin nodule: Pt states nodule to L underarm. Has had these in the past and had to be lanced.          In regard to the depression with anxiety, pt states a lot of stress with work and home. She feels she needs medication to help calm her emotions.          Genital warts: Pt states having a history.          PHQ-9 Depression Screening: Completed form scanned and in chart; Total Score 21 Alcohol Consumption Screening: Completed form scanned and in chart; Total Score 1     ROS:     CONSTITUTIONAL:  Negative for chills, fatigue, fever, and weight change.      CARDIOVASCULAR:  Negative for chest pain, palpitations, tachycardia, orthopnea, and edema.      RESPIRATORY:  Negative for cough, dyspnea, and hemoptysis.      MUSCULOSKELETAL:  Negative for arthralgias, back pain, and myalgias.      NEUROLOGICAL:  Negative for dizziness, headaches, paresthesias, and weakness.      PSYCHIATRIC:  Negative for anxiety, depression, and sleep disturbances.          PMH/FM/:     Last Reviewed on 8/01/2019 05:50 PM by Abimbola Roman    Past Medical History:             PREVENTIVE HEALTH MAINTENANCE             PAP SMEAR: hysterectomy         Surgical History:         Hysterectomy: stage III dysplasia;         Family History:         Positive for Coronary Artery Disease ( patSue GM ) and Myocardial  Infarction ( mat. GF ).      Positive for Type 2 Diabetes ( pat. GM ).  Father:  at age 63; Cause of death was pancreatic cacner     Mother: Healthy     Son(s): Healthy; 2 son(s) total     Daughter(s): 1 daughter(s) total; 1 ;  pneumonia/ at 17         Social History:     Occupation: [employer] supervisor     Marital Status:      Children: 3 children         Tobacco/Alcohol/Supplements:     Last Reviewed on 2019 05:50 PM by Abimbola Roman    Tobacco: Current Smoker: She currently smokes some days, 1/2 pack per day.      Caffeine:  She admits to consuming caffeine via coffee ( 2 servings per day ) and tea ( 2 servings per day ).          Substance Abuse History:     Last Reviewed on 2019 05:50 PM by Abimbola Roman    NEGATIVE         Mental Health History:     Last Reviewed on 2019 05:50 PM by Abimbola Roman        Communicable Diseases (eg STDs):     Last Reviewed on 2019 05:50 PM by Abimbola Roman            Current Problems:     Last Reviewed on 2019 05:50 PM by Abimbola Roman    Hyperlipidemia     Hip pain     Hypertrophy of tonsils     Screening for hyperlipidemia     Polycystic ovaries     Nasal Congestion     Acquired hypothyroidism, unspecified cause     Elevated fasting glucose     History of recurrent UTI's     GERD     Vitamin D deficiency     High-risk sexual behavior     Obstructive sleep apnea (adult) (pediatric)     Depression with anxiety     Acquired hypothyroidism     Screening for depression         Immunizations:     Fluzone Quadrivalent (3+ years) 10/30/2017     PPD 2007         Allergies:     Last Reviewed on 2019 05:50 PM by Abimbola Roman    Effexor: palpitations    Prozac:        Current Medications:     Last Reviewed on 2019 05:50 PM by Abimbola Roman    Metformin HCl 750mg Tablets, Extended Release 1 tab daily     Synthroid 0.125mg Tablet 1 tab daily     Chantix Continuing Month Box 1mg Tablet 1 tablet twice  daily     Norethindrone Acetate 0.35mg Tablet Take 1 tablet(s) by mouth daily as directed.         OBJECTIVE:        Vitals:         Current: 8/1/2019 5:32:36 PM    Ht:  5 ft, 6.5 in;  Wt: 257.8 lbs;  BMI: 41.0    T: 98.4 F (oral);  BP: 125/83 mm Hg (left arm, sitting);  P: 95 bpm (left arm (BP Cuff), sitting);  sCr: 0.83 mg/dL;  GFR: 116.26        Exams:     PHYSICAL EXAM:     GENERAL:  well developed and nourished; appropriately groomed; in no apparent distress;     RESPIRATORY:  lungs clear to auscultation and percussion; symmetric expansion; no dyspnea;     CARDIOVASCULAR: regular rate and rhythm; normal S1, S2; no murmur, rub, or gallop; normal PMI;     GASTROINTESTINAL: nontender, nondistended; no hepatosplenomegaly or masses; no bruits;     GENITOURINARY: external genitalia: condyloma;     BREAST/INTEGUMENT: L underarm with nodule, tender, hardened, erythema.;     MUSCULOSKELETAL:  Normal range of motion, strength and tone;     NEUROLOGICAL:  cranial nerves, motor and sensory function, reflexes, gait and coordination are all intact;     PSYCHIATRIC: appropriate affect and demeanor; normal psychomotor function; normal speech pattern; normal thought and perception;         ASSESSMENT           616.10   N76.0  Vaginitis              DDx:     782.2   R22.9  Skin nodule              DDx:     300.4   F34.1  Depression with anxiety              DDx:     078.11   A63.0  Genital warts              DDx:     V79.0   Z13.31  Screening for depression              DDx:         ORDERS:         Meds Prescribed:       Bupropion HCl 75mg Tablet 1 tab daily in am  #90 (Ninety) tablet(s) Refills: 1       Bactrim DS (Trimethoprim/Sulfamethoxazole ) Tablet Take 1 tablet(s) by mouth q12h for 10 days  #20 (Twenty) tablet(s) Refills: 0       Imiquimod 5% Cream Apply thin film to affected area 3 times weekly before bedtime and leave on for 6 to 10 hours  #24 (Twenty Four) packet(s) Refills: 0       Diflucan (Fluconazole) 150mg Tablet  Take 1 tablet(s) by mouth on day #1, then 1 tablet on day #3  #2 (Two) tablet(s) Refills: 0         Lab Orders:       [id number]  VAGSC - [facility] VAG SCREEN CANDIDA,STARR, & TRICH [id number]  (Send-Out)                   PLAN:          Vaginitis Luvena recommeded     LABORATORY:  Labs ordered to be performed today include Vaginal Screen.            Orders:       [id number]  VAGSC - [facility] VAG SCREEN CANDIDA,STARR, & TRICH [id number]  (Send-Out)            Skin nodule PT to get otc probiotic           Prescriptions:       Bactrim DS (Trimethoprim/Sulfamethoxazole ) Tablet Take 1 tablet(s) by mouth q12h for 10 days  #20 (Twenty) tablet(s) Refills: 0       Diflucan (Fluconazole) 150mg Tablet Take 1 tablet(s) by mouth on day #1, then 1 tablet on day #3  #2 (Two) tablet(s) Refills: 0          Depression with anxiety           Prescriptions:       Bupropion HCl 75mg Tablet 1 tab daily in am  #90 (Ninety) tablet(s) Refills: 1          Genital warts           Prescriptions:       Imiquimod 5% Cream Apply thin film to affected area 3 times weekly before bedtime and leave on for 6 to 10 hours  #24 (Twenty Four) packet(s) Refills: 0             CHARGE CAPTURE           **Please note: ICD descriptions below are intended for billing purposes only and may not represent clinical diagnoses**        Primary Diagnosis:         616.10 Vaginitis            N76.0    Acute vaginitis              Orders:          [id number]   Office/outpatient visit; established patient, level 3  (In-House)           782.2 Skin nodule            R22.9    Localized swelling, mass and lump, unspecified    300.4 Depression with anxiety            F34.1    Dysthymic disorder    078.11 Genital warts            A63.0    Anogenital (venereal) warts    V79.0 Screening for depression            Z13.31    Encounter for screening for depression

## 2021-05-18 NOTE — PROGRESS NOTES
Aurora Mendez 1980     Office/Outpatient Visit    Visit Date: Wed, Sep 26, 2018 01:51 pm    Provider: Imtiaz Gloria,   (Assistant: Tori Pickard MA)    Location: [facility], [place]        Electronically signed by Imtiaz Gloria,   on  10/04/2018 07:49:35 AM                             SUBJECTIVE:        CC:     Ms. Mendez is a 37 year old White female.  presents today due to headache, congestion, sore throat         HPI:     Pt woke up with sore throat on Sunday, she took a nyquil and sore throat has been getting better. This morning patient reports having a headache, dizzy. No fever during this time. Coughing a lot on waking and nose is not congested. Pt gets headaches at least once a week. Headaches are behind and above the eyes. Today's headache is different in that its on the top of her head, sharp. Not releived by laying down or placing pressure on her eyes. Has not tried ibuprofen or tylenol.     As above, headache associated with sinusitis, somewhat different than her usual headache.     ROS:     CONSTITUTIONAL:  Negative for fatigue and fever.      EYES:  Negative for blurred vision.      E/N/T:  Positive for nasal congestion.   Negative for diminished hearing.      CARDIOVASCULAR:  Negative for chest pain and palpitations.      RESPIRATORY:  Positive for recent cough.   Negative for dyspnea.      GASTROINTESTINAL:  Negative for abdominal pain, constipation, diarrhea, nausea and vomiting.      GENITOURINARY:  Negative for dysuria and urinary incontinence.      MUSCULOSKELETAL:  Negative for arthralgias and myalgias.      INTEGUMENTARY/BREAST:  Negative for atypical mole(s) and rash.      NEUROLOGICAL:  Positive for dizziness and headaches.   Negative for paresthesias or weakness.      PSYCHIATRIC:  Negative for anxiety, depression and sleep disturbance.          PMH/FMH/SH:     Last Reviewed on 8/10/2018 11:22 AM by Abimbola Roman    Past Medical History:             PREVENTIVE HEALTH  MAINTENANCE             PAP SMEAR: hysterectomy         Surgical History:         Hysterectomy: stage III dysplasia;         Family History:         Positive for Coronary Artery Disease ( pat. GM ) and Myocardial Infarction ( mat. GF ).      Positive for Type 2 Diabetes ( pat. GM ).  Father:  at age 63; Cause of death was pancreatic cacner     Mother: Healthy     Son(s): Healthy; 2 son(s) total     Daughter(s): 1 daughter(s) total; 1 ;  pneumonia/ at 17         Social History:     Occupation: [employer] supervisor     Marital Status:      Children: 3 children         Tobacco/Alcohol/Supplements:     Last Reviewed on 8/10/2018 11:22 AM by Abimbola Roman    Tobacco: Current Smoker: She currently smokes every day, 1 pack per day.      Caffeine:  She admits to consuming caffeine via coffee ( 2 servings per day ) and tea ( 2 servings per day ).          Substance Abuse History:     Last Reviewed on 8/10/2018 11:22 AM by Abimbola Roman    NEGATIVE         Mental Health History:     Last Reviewed on 8/10/2018 11:22 AM by Abimbola Roman        Communicable Diseases (eg STDs):     Last Reviewed on 8/10/2018 11:22 AM by Abimbola Roman            Current Problems:     Last Reviewed on 8/10/2018 11:22 AM by Abimbola Roman    Hypertrophy of tonsils     Screening for hyperlipidemia     Polycystic ovaries     Nasal Congestion     Leukocytosis, unspecified     Plantar wart     Acquired hypothyroidism, unspecified cause     Urinary frequency     Elevated fasting glucose     History of recurrent UTI's     GERD     Vitamin D deficiency     Ovarian cyst     High-risk sexual behavior     Obstructive sleep apnea (adult) (pediatric)     Depression with anxiety     Acquired hypothyroidism     Tobacco use disorder     Candidiasis, unspecified site     Allergic rhinitis, unspecified cause     Nausea         Immunizations:     Fluzone Quadrivalent (3+ years) 10/30/2017     PPD 2007         Allergies:      Last Reviewed on 9/26/2018 01:55 PM by Tori Pickard    Effexor: palpitations    Prozac:        Current Medications:     Last Reviewed on 9/26/2018 01:56 PM by Tori Pickard    Synthroid 0.125mg Tablet 1 tab daily     Wellbutrin SR 150mg Tablets, Sustained Release Take 1 tablet(s) by mouth bid     Vitamin D3 5,000IU Capsules 1 capsule every day     OTC Calcium w/ Vitamin D3 Take one tablet once daily     Norethindrone Acetate 0.35mg Tablet Take 1 tablet(s) by mouth daily as directed.     Metformin HCl 750mg Tablets, Extended Release 1 tab daily         OBJECTIVE:        Vitals:         Current: 9/26/2018 1:58:49 PM    Ht:  5 ft, 6.5 in;  Wt: 250.6 lbs;  BMI: 39.8    T: 98.4 F (oral);  BP: 113/72 mm Hg (left arm, sitting);  P: 118 bpm (left arm (BP Cuff), sitting);  sCr: 0.82 mg/dL;  GFR: 117.40        Exams:     PHYSICAL EXAM:     GENERAL: vital signs recorded - well developed, well nourished;  well groomed;  no apparent distress;     EYES: extraocular movements intact; conjunctiva and cornea are normal; PERRLA;     E/N/T: OROPHARYNX:  normal mucosa, dentition, gingiva, and posterior pharynx;     NECK: range of motion is normal; thyroid exam reveals not enlarged;     RESPIRATORY: normal respiratory rate and pattern with no distress; normal breath sounds with no rales, rhonchi, wheezes or rubs;     CARDIOVASCULAR: normal rate; rhythm is regular;  no systolic murmur; no edema;     GASTROINTESTINAL: nontender; normal bowel sounds;     MUSCULOSKELETAL: normal gait; normal overall tone     NEUROLOGIC: mental status: alert and oriented x 3;     PSYCHIATRIC:  appropriate affect and demeanor; normal speech pattern; grossly normal memory;         ASSESSMENT           462   J02.8  Sore throat              DDx:     346.20   R51  Sinus headaches              DDx:         ORDERS:         Meds Prescribed:       Montelukast Sodium 10mg Tablet 1 tab hs  #30 (Thirty) tablet(s) Refills: 0       Claritin (Loratadine)  10mg Tablet 1 tab(s) by mouth daily  #30 (Thirty) tablet(s) Refills: 5       Flonase Allergy Relief (Fluticasone Propionate) 50mcg/1actuation Nasal Spray 1 spray each nostril bid x 1 week then 1 spray each nostril once daily  #16 (Sixteen) ml Refills: 0                 PLAN:          Sinus headaches     SInus headache, pt counseled to try these sinus medications to clear sinuses and to try ibuprofen and tylenol. Also discussed neti pot.            Prescriptions:       Montelukast Sodium 10mg Tablet 1 tab hs  #30 (Thirty) tablet(s) Refills: 0       Claritin (Loratadine) 10mg Tablet 1 tab(s) by mouth daily  #30 (Thirty) tablet(s) Refills: 5       Flonase Allergy Relief (Fluticasone Propionate) 50mcg/1actuation Nasal Spray 1 spray each nostril bid x 1 week then 1 spray each nostril once daily  #16 (Sixteen) ml Refills: 0             CHARGE CAPTURE           **Please note: ICD descriptions below are intended for billing purposes only and may not represent clinical diagnoses**        Primary Diagnosis:         462 Sore throat            J02.8    Acute pharyngitis due to other specified organisms              Orders:          18356   Office/outpatient visit; established patient, level 3  (In-House)           346.20 Sinus headaches            R51    Headache

## 2021-05-18 NOTE — PROGRESS NOTES
"Aurora Mendez  1980     Office/Outpatient Visit    Visit Date: Wed, Apr 21, 2021 03:22 pm    Provider: Gabbie Barnard N.P. (Assistant: Veena Osborne MA)    Location: [facility]        Electronically signed by Gabbie Barnard N.P. on  04/22/2021 07:13:04 PM                             Subjective:        CC: Ms. Mendez is a 40 year old White female.  This is a follow-up visit.  \"break out around breast/arms\"; pt states she is taking lamotrigine BID         HPI:           Complaint of furuncle of other sites..  The symptom began 1 month ago.  Aurora is in for follow up on skin concerns.  She was previously treated via telehealth for candida of the skin and upon return today, it is noted that this is not the case, these are single lesions on the bottom of her breasts  they are lesions that she reports as coming and going.  but she is never fully ever able to get rid of all of them.  She does report that she feels like they did improve with the antibiotics, but never fully went away     ROS:     CONSTITUTIONAL:  Negative for chills, fatigue and fever.      CARDIOVASCULAR:  Negative for chest pain and pedal edema.      RESPIRATORY:  Negative for recent cough and dyspnea.      INTEGUMENTARY/BREAST:  Positive for suspicous mole ( right anterior  chest wall ) and rash.          Past Medical History / Family History / Social History:         Last Reviewed on 2/18/2021 04:52 PM by Ginger Contreras    Past Medical History:             PREVENTIVE HEALTH MAINTENANCE             DENTAL CLEANING: was last done 2019- [place]     EYE EXAM: was last done been some time     MAMMOGRAM: Done within last 2 years and results in are chart was last done 1- with normal results     PAP SMEAR: hysterectomy         Surgical History:         Hysterectomy: stage III dysplasia;         Family History:         Positive for Coronary Artery Disease ( pat. GM ) and Myocardial Infarction ( mat. GF ).      Positive " for Type 2 Diabetes ( pat. GM ).  Father:  at age 63; Cause of death was pancreatic cacner     Mother: Cause of death was Neuroendocrine tumor - Age 67     Son(s): Healthy; 2 son(s) total     Daughter(s): 1 daughter(s) total; 1 ;  pneumonia/ at 17         Social History:     Occupation: [employer] supervisor     Marital Status:      Children: 3 children         Tobacco/Alcohol/Supplements:     Last Reviewed on 2021 03:31 PM by Veena Osborne    Tobacco: Current Smoker: She currently smokes some days, 1/2 pack per day.      Caffeine:  She admits to consuming caffeine via coffee ( 2 servings per day ) and tea ( 2 servings per day ).          Substance Abuse History:     Last Reviewed on 2021 04:52 PM by Ginger Contreras    NEGATIVE         Mental Health History:     Last Reviewed on 2021 04:52 PM by Ginger Contreras        Generalized Anxiety Disorder    Posttraumatic Stress Disorder ( from loss of her daughter in  )     Major Depression         Communicable Diseases (eg STDs):     Last Reviewed on 2021 04:52 PM by Ginger Contreras        Current Problems:     Last Reviewed on 2021 04:52 PM by Ginger Contreras    Hypothyroidism, unspecified    Dysthymic disorder    Vitamin D deficiency, unspecified    Polycystic ovarian syndrome    Pure hyperglyceridemia    Anogenital (venereal) warts    Mixed hyperlipidemia    Nicotine dependence, unspecified, uncomplicated    Contact with and (suspected) exposure to other viral communicable diseases    Major depressive disorder, recurrent severe without psychotic features    Generalized anxiety disorder    Strain of unspecified muscle, fascia and tendon at shoulder and upper arm level, left arm, initial encounter    Pain in right knee    Pain in left knee    Adjustment disorder with depressed mood    Psychophysiologic insomnia    Chest pain, unspecified    Candidiasis of skin and nail    Contact with and (suspected) exposure to COVID-19     Encounter for screening mammogram for malignant neoplasm of breast    Furuncle of other sites    Neoplasm of uncertain behavior of skin        Immunizations:     Fluzone Quadrivalent (3+ years) 10/30/2017    PPD 1/30/2007        Allergies:     Last Reviewed on 4/21/2021 03:31 PM by Veena Osborne    Effexor: palpitations     Prozac:          Current Medications:     Last Reviewed on 4/21/2021 03:31 PM by Veena Osborne    buPROPion  mg oral Tablet, Extended Release 24 hr [take 1 tablet (450 mg) by oral route once daily swallowing whole. Do not crush, chew and/or divide.]    lamoTRIgine 100 mg oral tablet [take 1 tablet (100 mg) by oral route BID]    QUEtiapine 25 mg oral tablet [take 1/2-1 tablet by oral route prn ]    TylenoL 325 mg oral tablet [take 1 - 2 tablets (325 - 650 mg) by oral route every 4-6 hours as needed]    Synthroid 125 mcg oral tablet [1 tab daily]    norethindrone (contraceptive) 0.35 mg oral tablet [Take 1 tablet(s) by mouth daily as directed.]    atorvastatin 40 mg oral tablet [take 1 tablet (40 mg) by oral route once daily @ HS]    metFORMIN 500 mg oral Tablet, Extended Release 24 hr [take 1 tablet (500 mg) by oral route twice daily]    Desenex 2 % Topical Powder [apply to the affected area(s) by topical route 2 times per day in the morning and evening]    busPIRone 15 mg oral tablet [take 1 tablet (15 mg) by oral route 2 times per day]        Objective:        Vitals:         Current: 4/21/2021 3:32:53 PM    Ht:  5 ft, 6.5 in;  Wt: 259 lbs;  BMI: 41.2T: 98.3 F (temporal);  BP: 135/85 mm Hg (left arm, sitting);  P: 98 bpm (left arm (BP Cuff), sitting);  sCr: 0.91 mg/dL;  GFR: 104.20        Exams:     PHYSICAL EXAM:     GENERAL: vital signs recorded - well developed, well nourished;  no apparent distress;     RESPIRATORY: normal appearance and symmetric expansion of chest wall; normal respiratory rate and pattern with no distress; normal breath sounds with no rales, rhonchi, wheezes or  rubs;     CARDIOVASCULAR: normal rate; rhythm is regular;  no edema;     LYMPHATIC: no enlargement of cervical or facial nodes; no axillary adenopathy;     BREAST/INTEGUMENT: Boil located on axilla,     MUSCULOSKELETAL: normal gait; normal range of motion of all major muscle groups; no limb or joint pain with range of motion;     NEUROLOGIC: mental status: alert and oriented x 3;     PSYCHIATRIC: appropriate affect and demeanor; normal speech pattern; normal thought and perception;         Assessment:         L02.828   Furuncle of other sites       D48.5   Neoplasm of uncertain behavior of skin           ORDERS:         Meds Prescribed:       [Refilled] metFORMIN 500 mg oral Tablet, Extended Release 24 hr [take 1 tablet (500 mg) by oral route twice daily], #60 (sixty) tablets, Refills: 2 (two)       [New Rx] doxycycline monohydrate 100 mg oral capsule [take 1 capsule (100 mg) by oral route daily x 30 days], #30 (thirty) capsules, Refills: 0 (zero)       [New Rx] Diflucan 150 mg oral tablet [take 1 tablet (150 mg) by oral route now  may repeat in 1 week], #2 (two) tablets, Refills: 0 (zero)         Procedures Ordered:       REFER  Referral to Specialist or Other Facility  (Send-Out)                      Plan:         Furuncle of other sitesWill treat for a longer period of time.  She may have recurring episodes  but will treat today.  No evidence of need for I&D on any lesions          Prescriptions:       [New Rx] doxycycline monohydrate 100 mg oral capsule [take 1 capsule (100 mg) by oral route daily x 30 days], #30 (thirty) capsules, Refills: 0 (zero)       [New Rx] Diflucan 150 mg oral tablet [take 1 tablet (150 mg) by oral route now  may repeat in 1 week], #2 (two) tablets, Refills: 0 (zero)         Neoplasm of uncertain behavior of skinAurora is concerned as her mother passed away from what was thought to be associated with a skin lesion - she did ultimately have Neuroendocrine cancer - but this lesion appears  to be an actinic keratosis.  Will refer to derm for skin check given her family history and this is a newly developed lesion        REFERRALS:  Referral initiated to a dermatologist ( Dr. Rubi Pollock ).            Orders:       REFER  Referral to Specialist or Other Facility  (Send-Out)                  Other Prescriptions:       [Refilled] metFORMIN 500 mg oral Tablet, Extended Release 24 hr [take 1 tablet (500 mg) by oral route twice daily], #60 (sixty) tablets, Refills: 2 (two)         Charge Capture:         Primary Diagnosis:     L02.828  Furuncle of other sites           Orders:      [id number]  Office/outpatient visit; established patient, level 3  (In-House)              D48.5  Neoplasm of uncertain behavior of skin

## 2021-05-18 NOTE — PROGRESS NOTES
Aurora Mendez  1980     Office/Outpatient Visit    Visit Date: Tue, Dec 17, 2019 05:21 pm    Provider: Gabbie Barnard N.P. (Assistant: Sury Trejo, )    Location: [facility]        Electronically signed by Gabbie Barnard N.P. on  12/22/2019 11:22:26 PM                             Subjective:        CC: Ms. Mendez is a 39 year old White female.  Physical-- establish care (patient of [facility]);         HPI:           Patient complains of encounter for general adult medical examination without abnormal findings.  Her last physical exam was several years ago.  She performs breast self-exams monthly.  She is not current with her influenza immunization.            In regard to the hypothyroidism, unspecified, she denies any related symptoms.  She reports no symptoms suggestive of adverse medication effect.  Last TSH was August and was normal  - has been stable on current dose for years          Dysthymic disorder details; her anxiety disorder was originally diagnosed many years ago.  Her symptom complex includes chest pain, a choking or smothering sensation, hyperventilation, palpitations, increased perspiration, and shortness of breath.  True panic attacks occur in addition to generalized anxiety.  Apparent triggers include new situations- meeting new people, changes.  Current treatment includes an antidepressant.  currently on bupropion - feels like she needs some improvement with her anxiety and depression           Complaint of vitamin D deficiency, unspecified..  history of Vitamin D deficiency - has not been taking supplements at this time           Complaint of polycystic ovarian syndrome..  history of PCOS - Takes Metformin and OC for control of her symptoms - well controlled other than currently having discharge/itching - (history of BV)           With regard to the pure hyperglyceridemia, compliance with treatment has been good.  Taking simvastatin dailyShe specifically  denies associated symptoms, including muscle pain and weakness.            Concerning mixed hyperlipidemia, compliance with treatment has been good.  She specifically denies associated symptoms, including muscle pain and weakness.      ROS:     CONSTITUTIONAL:  Positive for fatigue ( mild ).   Negative for chills or fever.      E/N/T:  Negative for diminished hearing and nasal congestion.      CARDIOVASCULAR:  Positive for palpitations ( when anxious ).   Negative for chest pain or pedal edema.      RESPIRATORY:  Negative for recent cough and dyspnea.      GASTROINTESTINAL:  Negative for abdominal pain, constipation, diarrhea, heartburn, nausea and vomiting.      GENITOURINARY:  Negative for dysuria and change in urine stream.      MUSCULOSKELETAL:  Negative for arthralgias and myalgias.      NEUROLOGICAL:  Negative for dizziness, fainting, headaches and paresthesias.      ENDOCRINE:  Negative for hair loss, polydipsia and polyphagia.      ALLERGIC/IMMUNOLOGIC:  Negative for seasonal allergies.      PSYCHIATRIC:  Positive for anxiety, depression, feelings of stress and mood swings.   Negative for sadness, sleep disturbance or suicidal thoughts.          Past Medical History / Family History / Social History:         Last Reviewed on 2019 10:57 PM by Gabbie Barnard    Past Medical History:             PREVENTIVE HEALTH MAINTENANCE             DENTAL CLEANING: was last done - [place]     EYE EXAM: was last done been some time     MAMMOGRAM: has never been done The next one is due  NOW     PAP SMEAR: hysterectomy         Surgical History:         Hysterectomy: stage III dysplasia;         Family History:         Positive for Coronary Artery Disease ( pat. GM ) and Myocardial Infarction ( mat. GF ).      Positive for Type 2 Diabetes ( pat. GM ).  Father:  at age 63; Cause of death was pancreatic cacner     Mother: Healthy     Son(s): Healthy; 2 son(s) total     Daughter(s): 1 daughter(s) total; 1  ;  pneumonia/ at 17         Social History:     Occupation: [employer] supervisor     Marital Status:      Children: 3 children         Tobacco/Alcohol/Supplements:     Last Reviewed on 2019 10:57 PM by Gabbie Barnard    Tobacco: Current Smoker: She currently smokes some days, 1/2 pack per day.      Caffeine:  She admits to consuming caffeine via coffee ( 2 servings per day ) and tea ( 2 servings per day ).          Substance Abuse History:     Last Reviewed on 2019 10:57 PM by Gabbie Barnard    NEGATIVE         Mental Health History:     Last Reviewed on 2019 10:57 PM by Gabbie Barnard        Communicable Diseases (eg STDs):     Last Reviewed on 2019 10:57 PM by Gabbie Barnard        Current Problems:     Last Reviewed on 2019 10:57 PM by Gabbie Barnard    Hypothyroidism, unspecified    Dysthymic disorder    Vitamin D deficiency, unspecified    Polycystic ovarian syndrome    Pure hyperglyceridemia    Anogenital (venereal) warts    Mixed hyperlipidemia    Subacute and chronic vaginitis    Encounter for general adult medical examination without abnormal findings    Encounter for screening mammogram for malignant neoplasm of breast        Immunizations:     Fluzone Quadrivalent (3+ years) 10/30/2017    PPD 2007        Allergies:     Last Reviewed on 2019 10:57 PM by Gabbie Barnard    Effexor: palpitations     Prozac:          Current Medications:     Last Reviewed on 2019 10:57 PM by Gabbie Barnard    Synthroid 0.125mg Tablet [1 tab daily]    norethindrone (contraceptive) 0.35 mg oral tablet [Take 1 tablet(s) by mouth daily as directed.]    metFORMIN 750 mg oral Tablet, Extended Release 24 hr [1 tab daily]    simvastatin 20 mg oral tablet [take 1 tablet (20 mg) by oral route once daily in the evening]    Clindamycin Phosphate 2% Vaginal Cream [Insert 1 applicatorful(s) in vagina at bedtime for 7 days]        Objective:        Vitals:          Historical:     12/2/2019  BP:   136/72 mm Hg ( (left arm, , sitting, );) 8/15/2019  BP:   133/86 mm Hg ( (left arm, , sitting, );) 8/1/2019  BP:   125/83 mm Hg ( (left arm, , sitting, );)     Current: 12/17/2019 5:31:59 PM    Ht:  5 ft, 6.5 in;  Wt: 252.4 lbs;  BMI: 40.1T: 98.3 F (oral);  BP: 141/86 mm Hg (left arm, sitting);  P: 86 bpm (left arm (BP Cuff), sitting);  sCr: 0.75 mg/dL;  GFR: 126.28        Exams:     PHYSICAL EXAM:     GENERAL: vital signs recorded - well developed, well nourished;  no apparent distress, anxious;     NECK: range of motion is normal;     RESPIRATORY: normal appearance and symmetric expansion of chest wall; normal respiratory rate and pattern with no distress; normal breath sounds with no rales, rhonchi, wheezes or rubs;     CARDIOVASCULAR: normal rate; rhythm is regular;     GASTROINTESTINAL: nontender; normal bowel sounds; no organomegaly;     LYMPHATIC: no enlargement of cervical or facial nodes; no supraclavicular nodes;     MUSCULOSKELETAL: normal gait; normal range of motion of all major muscle groups; no limb or joint pain with range of motion;     NEUROLOGIC: mental status: alert and oriented x 3;     PSYCHIATRIC: affect/demeanor: anxious;  normal speech pattern; normal thought and perception;         Assessment:         Z00.00   Encounter for general adult medical examination without abnormal findings       E03.9   Hypothyroidism, unspecified       F34.1   Dysthymic disorder       E55.9   Vitamin D deficiency, unspecified       E28.2   Polycystic ovarian syndrome       E78.1   Pure hyperglyceridemia       E78.2   Mixed hyperlipidemia       N76.1   Subacute and chronic vaginitis       Z12.31   Encounter for screening mammogram for malignant neoplasm of breast           ORDERS:         Meds Prescribed:       [Refilled] simvastatin 20 mg oral tablet [take 1 tablet (20 mg) by oral route once daily in the evening], #90 (ninety) tablets, Refills: 1 (one)       [Refilled]  Clindamycin Phosphate 2 % Vaginal Cream [Insert 1 applicatorful(s) in vagina at bedtime for 7 days], #7 (seven) applicators, Refills: 0 (zero)       [New Rx] buPROPion HCl 150 mg oral Tablet, Extended Release 24 hr [take 1 tablet (150 mg) by oral route once daily], #30 (thirty) tablets, Refills: 3 (three)         Radiology/Test Orders:       [id number]  Screening mammography, bilateral (2-view film study of each breast)  (Send-Out)              Lab Orders:       APPTO  Appointment need  (In-House)            FUTURE  Future order to be done at patients convenience  (Send-Out)            [id number]  [facility] TSH  (Send-Out)              Other Orders:         Depression screen positive and follow up plan documented  (In-House)                      Plan:         Encounter for general adult medical examination without abnormal findings    MIPS PHQ-9 Depression Screening: Completed form scanned and in chart; Total Score 11 Positive Depression Screen: Pharmacologic intervention initiated/modified           Orders:         Depression screen positive and follow up plan documented  (In-House)              Hypothyroidism, unspecifiedcontinue current dose - will check labs and maintain current dose unless labs indicate otherwiseOK for refills through March 2020 - will need TSH level for any fills after that         FOLLOW-UP TESTING #1: FOLLOW-UP LABORATORY:  Labs to be scheduled in the future include TSH.   Patient to schedule in 3 months.            Orders:       FUTURE  Future order to be done at patients convenience  (Send-Out)            [id number] [place] [address] TSH  (Send-Out)              Dysthymic disorderWill need an appt in 3 months to see how medication adjustment has helped - follow up sooner if problems or concerns        FOLLOW-UP: Schedule a follow-up visit in 3 months.:.:for follow up medication adjustment           Prescriptions:       [New Rx] buPROPion HCl 150 mg oral Tablet, Extended Release 24 hr [take 1 tablet  (150 mg) by oral route once daily], #30 (thirty) tablets, Refills: 3 (three)           Orders:       APPTO  Appointment need  (In-House)              Vitamin D deficiency, unspecifiedWe will re-evaluate levels at next lab draw         Polycystic ovarian syndromecontinue current treatment         Pure hyperglyceridemiacontinue current treatment        Mixed hyperlipidemiashe has an order already at home for follow up testing - will get done this week          Prescriptions:       [Refilled] simvastatin 20 mg oral tablet [take 1 tablet (20 mg) by oral route once daily in the evening], #90 (ninety) tablets, Refills: 1 (one)         Subacute and chronic vaginitisreports chronic vaginitis - typically treated wiht clindamycin gel - I did discuss that if this is ineffective, she will have to come in for a pelvic exam          Prescriptions:       [Refilled] Clindamycin Phosphate 2 % Vaginal Cream [Insert 1 applicatorful(s) in vagina at bedtime for 7 days], #7 (seven) applicators, Refills: 0 (zero)         Encounter for screening mammogram for malignant neoplasm of breast        FOLLOW-UP TESTING #1:    RADIOLOGY:  I have ordered Mammogram Screening to be done today.            Orders:       [id number]  Screening mammography, bilateral (2-view film study of each breast)  (Send-Out)                  Patient Recommendations:        For  Hypothyroidism, unspecified:            The following laboratory testing has been ordered: TSH Schedule the above testing in 3 months.          For  Dysthymic disorder:    Schedule a follow-up visit in 3 months.                APPOINTMENT INFORMATION:        Monday Tuesday Wednesday Thursday Friday Saturday Sunday            Time:___________________AM  PM   Date:_____________________             Charge Capture:         Primary Diagnosis:     Z00.00  Encounter for general adult medical examination without abnormal findings           Orders:      [id number]  Preventive medicine, established patient,  age 40-64 years  (In-House)              Depression screen positive and follow up plan documented  (In-House)              E03.9  Hypothyroidism, unspecified           Orders:      [id number]  Office/outpatient visit; established patient, level 3  (In-House)              F34.1  Dysthymic disorder           Orders:      APPTO  Appointment need  (In-House)              E55.9  Vitamin D deficiency, unspecified     E28.2  Polycystic ovarian syndrome     E78.1  Pure hyperglyceridemia     E78.2  Mixed hyperlipidemia     N76.1  Subacute and chronic vaginitis     Z12.31  Encounter for screening mammogram for malignant neoplasm of breast

## 2021-05-18 NOTE — PROGRESS NOTES
Aurora Mendez  1980     Office/Outpatient Visit    Visit Date: Thu, Feb 18, 2021 04:21 pm    Provider: Ginger Contreras MD (Assistant: Cris Tavera MA)    Location: [facility]        Electronically signed by Ginger Contreras MD on  02/18/2021 04:54:19 PM                             Subjective:        CC: Ms. Mendez is a 40 year old White female.  Patient presents today with complaints of chest pain, SOB;         HPI:       Aurora is here today with ongoing, acute chest pain.  She was seen by Manisha yesterday via telehealth for this complaint.  At that time, Manisha ordered an EKG and CXR to be done today.  When Aurora came in to have those done, she reported to Deedee that she was having ongoing chest pain, so she was brought back for additional evaluation.        She had a f/u visit with Manisha yesterday just for routine issues.  Incidentally, for the past 2.5 weeks, she has had L-sided chest pains. S harp, over the breast.  It would lcome every couple of hours, lasting 20-30 min at a time.  Over time, the pain started to spread.  It is pressure-like, coming and going.  Since 6:00 PM last night, the pain came on and has been constant since then.  The pain kept her awake through the night.  She felt the pressure come on in the mid-chest.  A/w with some shortness of air.  +Diaphoresis.  No nausea or vomiting.  Pain is non-radiating.  No effect on the pain with exertion or eating.          EKG shows sinus tachycardia to 97 (116 VS on being roomed) but no other significant abnormalities.        She does take anxiety medication, managed by [facility].  She is on bupsirone, bupropion, lamotrigine.          She has had problems with GERD in the past, but she is not currently on any anti-acids.    ROS:     CONSTITUTIONAL:  Negative for fatigue and fever.      EYES:  Negative for blurred vision.      CARDIOVASCULAR:  Positive for chest pain ( constant ), dizziness, palpitations, tachycardia and  diaphoresis.   Negative for paroxysmal nocturnal dyspnea or pedal edema.      RESPIRATORY:  Negative for recent cough and dyspnea.      GASTROINTESTINAL:  Negative for nausea and vomiting.      MUSCULOSKELETAL:  Negative for arthralgias and myalgias.      PSYCHIATRIC:  Positive for anxiety.          Past Medical History / Family History / Social History:         Last Reviewed on 2021 04:52 PM by Ginger Contreras    Past Medical History:             PREVENTIVE HEALTH MAINTENANCE             DENTAL CLEANING: was last done - [place]     EYE EXAM: was last done been some time     MAMMOGRAM: Done within last 2 years and results in are chart was last done 2020 with normal results     PAP SMEAR: hysterectomy         Surgical History:         Hysterectomy: stage III dysplasia;         Family History:         Positive for Coronary Artery Disease ( pat. GM ) and Myocardial Infarction ( mat. GF ).      Positive for Type 2 Diabetes ( pat. GM ).  Father:  at age 63; Cause of death was pancreatic cacner     Mother: Healthy     Son(s): Healthy; 2 son(s) total     Daughter(s): 1 daughter(s) total; 1 ;  pneumonia/ at 17         Social History:     Occupation: [employer] supervisor     Marital Status:      Children: 3 children         Tobacco/Alcohol/Supplements:     Last Reviewed on 2021 04:52 PM by Ginger Contreras    Tobacco: Current Smoker: She currently smokes some days, 1/2 pack per day.      Caffeine:  She admits to consuming caffeine via coffee ( 2 servings per day ) and tea ( 2 servings per day ).          Substance Abuse History:     Last Reviewed on 2021 04:52 PM by Ginger Contreras    NEGATIVE         Mental Health History:     Last Reviewed on 2021 04:52 PM by Ginger Contreras        Generalized Anxiety Disorder    Posttraumatic Stress Disorder ( from loss of her daughter in  )     Major Depression         Communicable Diseases (eg STDs):     Last Reviewed on 2021  04:52 PM by Ginger Contreras        Current Problems:     Last Reviewed on 12/09/2020 09:47 PM by Gabbie Barnard    Dysthymic disorder    Hypothyroidism, unspecified    Vitamin D deficiency, unspecified    Polycystic ovarian syndrome    Pure hyperglyceridemia    Anogenital (venereal) warts    Mixed hyperlipidemia    Nicotine dependence, unspecified, uncomplicated    Contact with and (suspected) exposure to other viral communicable diseases    Major depressive disorder, recurrent severe without psychotic features    Generalized anxiety disorder    Strain of unspecified muscle, fascia and tendon at shoulder and upper arm level, left arm, initial encounter    Pain in right knee    Pain in left knee    Adjustment disorder with depressed mood    Psychophysiologic insomnia    Chest pain, unspecified    Rash and other nonspecific skin eruption    Contact with and (suspected) exposure to COVID-19        Immunizations:     Fluzone Quadrivalent (3+ years) 10/30/2017    PPD 1/30/2007        Allergies:     Last Reviewed on 2/18/2021 04:24 PM by Cris Tavera    Effexor: palpitations     Prozac:          Current Medications:     Last Reviewed on 2/18/2021 04:24 PM by Cris Tavera    buPROPion  mg oral Tablet, Extended Release 24 hr [take 1 tablet (450 mg) by oral route once daily swallowing whole. Do not crush, chew and/or divide.]    lamoTRIgine 100 mg oral tablet [take 1 tablet (100 mg) by oral route at night ]    QUEtiapine 25 mg oral tablet [take 1/2-1 tablet by oral route prn ]    TylenoL 325 mg oral tablet [take 1 - 2 tablets (325 - 650 mg) by oral route every 4-6 hours as needed]    Synthroid 125 mcg oral tablet [1 tab daily]    norethindrone (contraceptive) 0.35 mg oral tablet [Take 1 tablet(s) by mouth daily as directed.]    atorvastatin 40 mg oral tablet [take 1 tablet (40 mg) by oral route once daily @ HS]    busPIRone 10 mg oral tablet    metFORMIN 500 mg oral Tablet, Extended Release 24 hr [take 1  tablet (500 mg) by oral route twice daily]    Desenex 2 % Topical Powder [apply to the affected area(s) by topical route 2 times per day in the morning and evening]    ketoconazole 2 % Topical Cream [apply to the affected area(s) by topical route 2 times per day apply after visible rash gone x 1 week]    cephALEXin 500 mg oral capsule [take 1 capsule (500 mg) by oral route TID x 5 days]        Objective:        Vitals:         Current: 2/18/2021 4:25:25 PM    Ht:  5 ft, 6.5 in;  Wt: 265.6 lbs;  BMI: 42.2T: 97.3 F (temporal);  BP: 139/97 mm Hg (left arm, sitting);  P: 116 bpm (left arm (BP Cuff), sitting);  sCr: 0.91 mg/dL;  GFR: 105.32        Exams:     PHYSICAL EXAM:     GENERAL: vital signs recorded - well developed, well nourished;  well groomed;  anxious;     EYES: extraocular movements intact; conjunctiva and cornea are normal; PERRLA;     RESPIRATORY: normal respiratory rate and pattern with no distress; NO REPRODUCIBLE TTP; normal breath sounds with no rales, rhonchi, wheezes or rubs;     CARDIOVASCULAR: normal rate; rhythm is regular;  no systolic murmur; no edema;     GASTROINTESTINAL: nontender; normal bowel sounds;     MUSCULOSKELETAL: normal gait; normal overall tone     PSYCHIATRIC: affect/demeanor: anxious;         Lab/Test Results:         LABORATORY RESULTS: EKG performed by tls         Assessment:         R07.9   Chest pain, unspecified           ORDERS:         Radiology/Test Orders:       [id number]  Electrocardiogram, routine with at least 12 leads; interpretation and report only  (In-House)                      Plan:         Chest pain, [id number] yo presents with constant pressure-like mid-sternal chest pain, associated with SOB and diaphoresis since last night.  No personal cardiac history but does have some family history.  EKG shows sinus tach.  +Anxiety for which she follows with [facility], so this may be a panic attack but given features of her pain, I am going to send her over to [facility] ED for  further eval.  She is going via POV.          Orders:       [id number]  Electrocardiogram, routine with at least 12 leads; interpretation and report only  (In-House)                  Charge Capture:         Primary Diagnosis:     R07.9  Chest pain, unspecified           Orders:      [id number]  Office/outpatient visit; established patient, level 4  (In-House)            [id number]  Electrocardiogram, routine with at least 12 leads; interpretation and report only  (In-House)                  ADDENDUMS:      ____________________________________    Addendum: 02/22/2021 01:04 PM - One, Team         Visit Note Faxed to:        Nader Duran  (Cardiology); Number [phone]

## 2021-05-18 NOTE — PROGRESS NOTES
Aurora Mendez 1980     Office/Outpatient Visit    Visit Date: Thu, Aug 15, 2019 05:20 pm    Provider: Abimbola Roman N.P. (Assistant: Veena Osborne MA)    Location: [facility]        Electronically signed by Abimbola Roman N.P. on  08/19/2019 08:43:09 AM                             SUBJECTIVE:        CC:     Ms. Mendez is a 38 year old White female.  This is a follow-up visit.  med refill? havent filled metrogel;         HPI:         Ms. Mendez presents with acquired hypothyroidism.  Pt states doing well. Here for f/u and refills.          PCOS: Pt states doing well on meds. Here for f/u and refills.          With regard to the tobacco use disorder, doing well on chantix. Needing refill         Bacterial vaginitis details; pt states sx of BV again. She has had it in the past and now knows when she gets it.          Skin nodule: Pt states nodules to underarms x 1 week. Has had these before and get big and infected. States it has been painful.      ROS:     CONSTITUTIONAL:  Negative for chills, fatigue, fever, and weight change.      EYES:  Negative for blurred vision.      CARDIOVASCULAR:  Negative for chest pain, orthopnea, paroxysmal nocturnal dyspnea and pedal edema.      RESPIRATORY:  Negative for dyspnea.      GASTROINTESTINAL:  Negative for abdominal pain, constipation, diarrhea, nausea and vomiting.      NEUROLOGICAL:  Negative for dizziness, headaches, paresthesias, and weakness.      PSYCHIATRIC:  Negative for anxiety, depression, and sleep disturbances.          [facility]/:     Last Reviewed on 8/15/2019 05:52 PM by Abimbola Roman    Past Medical History:             PREVENTIVE HEALTH MAINTENANCE             PAP SMEAR: hysterectomy         Surgical History:         Hysterectomy: stage III dysplasia;         Family History:         Positive for Coronary Artery Disease ( pat. GM ) and Myocardial Infarction ( mat. GF ).      Positive for Type 2 Diabetes ( pat. GM ).  Father:   at age 63; Cause of death was pancreatic cacner     Mother: Healthy     Son(s): Healthy; 2 son(s) total     Daughter(s): 1 daughter(s) total; 1 ;  pneumonia/ at 17         Social History:     Occupation: [employer] supervisor     Marital Status:      Children: 3 children         Tobacco/Alcohol/Supplements:     Last Reviewed on 8/15/2019 05:52 PM by Abimbola Roman    Tobacco: Current Smoker: She currently smokes some days, 1-5 cigarettes per day.      Caffeine:  She admits to consuming caffeine via coffee ( 2 servings per day ) and tea ( 2 servings per day ).          Substance Abuse History:     Last Reviewed on 8/15/2019 05:52 PM by Abimbola Roman    NEGATIVE         Mental Health History:     Last Reviewed on 8/15/2019 05:52 PM by Abimbola Roman        Communicable Diseases (eg STDs):     Last Reviewed on 8/15/2019 05:52 PM by Abimbola Roman            Current Problems:     Last Reviewed on 8/15/2019 05:52 PM by Abimbola Roman    Genital warts     Hyperlipidemia     Hip pain     Hypertrophy of tonsils     Screening for hyperlipidemia     Polycystic ovaries     Nasal Congestion     Acquired hypothyroidism, unspecified cause     Elevated fasting glucose     History of recurrent UTI's     GERD     Vitamin D deficiency     High-risk sexual behavior     Obstructive sleep apnea (adult) (pediatric)     Depression with anxiety     Acquired hypothyroidism     Skin nodule     Vaginitis     Screening for depression         Immunizations:     Fluzone Quadrivalent (3+ years) 10/30/2017     PPD 2007         Allergies:     Last Reviewed on 8/15/2019 05:52 PM by Abimbola Roman    Effexor: palpitations    Prozac:        Current Medications:     Last Reviewed on 8/15/2019 05:52 PM by Abimbola Roman    Metformin HCl 750mg Tablets, Extended Release 1 tab daily     Synthroid 0.125mg Tablet 1 tab daily     Chantix Continuing Month Box 1mg Tablet 1 tablet twice daily     Bupropion HCl 75mg  Tablet 1 tab daily in am     Imiquimod 5% Cream Apply thin film to affected area 3 times weekly before bedtime and leave on for 6 to 10 hours     Norethindrone Acetate 0.35mg Tablet Take 1 tablet(s) by mouth daily as directed.         OBJECTIVE:        Vitals:         Current: 8/15/2019 5:27:59 PM    Ht:  5 ft, 6.5 in;  Wt: 255.6 lbs;  BMI: 40.6    T: 98.6 F (oral);  BP: 133/86 mm Hg (left arm, sitting);  P: 97 bpm (left arm (BP Cuff), sitting);  sCr: 0.83 mg/dL;  GFR: 115.84        Exams:     PHYSICAL EXAM:     GENERAL: vital signs recorded - well developed, well nourished;  no apparent distress;     EYES: extraocular movements intact; conjunctiva and cornea are normal; PERRLA;     NECK: range of motion is normal; thyroid is non-palpable;     RESPIRATORY: normal respiratory rate and pattern with no distress; normal breath sounds with no rales, rhonchi, wheezes or rubs;     CARDIOVASCULAR: normal rate; rhythm is regular;  no systolic murmur; no edema;     GASTROINTESTINAL: nontender; normal bowel sounds; no organomegaly;     BREAST/INTEGUMENT: Nodule to L underarm with hard area, not fluctuant, tender.;     NEUROLOGICAL:  cranial nerves, motor and sensory function, reflexes, gait and coordination are all intact;     PSYCHIATRIC:  appropriate affect and demeanor; normal speech pattern; grossly normal memory;         ASSESSMENT:           244.8   E03.9  Acquired hypothyroidism              DDx:     256.4   E28.2  Polycystic ovaries              DDx:     305.1   F17.200  Tobacco use disorder              DDx:     616.10   N76.0  Bacterial vaginitis              DDx:     782.2   R22.9  Skin nodule              DDx:         ORDERS:         Meds Prescribed:       Refill of: Metformin HCl 750mg Tablets, Extended Release 1 tab daily  #90 (Ninety) tablet(s) Refills: 1       Refill of: Synthroid (Levothyroxine Sodium) 0.125mg Tablet 1 tab daily  #90 (Ninety) tablet(s) Refills: 1       Refill of: Chantix Continuing Month Box  (Varenicline Tartrate) 1mg Tablet 1 tablet twice daily  #56 (Fifty Six) tablet(s) Refills: 1       Refill of: MetroGel (Metronidazole) 0.75% Vaginal Gel Insert 1 applicatorful(s) in vagina at bedtime for 7 days  #70 (Seventy) gm Refills: 0       Refill of: Norethindrone Acetate (Norethindrone Acetate)  0.35mg Tablet Take 1 tablet(s) by mouth daily as directed.  #3 (Three) package Refills: 3       Bactrim DS (Trimethoprim/Sulfamethoxazole ) Tablet Take 1 tablet(s) by mouth q12h for 14 days  #28 (Twenty Eight) tablet(s) Refills: 0       Nystatin 100,000U/1gm Topical Powder Apply twice per day until rash cleared, then as needed  #60 (Sixty) gm Refills: 2         Lab Orders:       FUTURE  Future order to be done at patients convenience  (Send-Out)         [id number]  [facility] PHYSICAL: CMP, CBC, TSH, LIPID: [id number], [id number], [id number]  (Send-Out)                   PLAN:          Acquired hypothyroidism         FOLLOW-UP TESTING #1: FOLLOW-UP LABORATORY:  Labs to be scheduled in the future include PHYSICAL PANEL; CMP, CBC, TSH, LIPID.            Prescriptions:       Refill of: Synthroid (Levothyroxine Sodium) 0.125mg Tablet 1 tab daily  #90 (Ninety) tablet(s) Refills: 1           Orders:       FUTURE  Future order to be done at patients convenience  (Send-Out)         [id number]  [facility] PHYSICAL: CMP, CBC, TSH, LIPID: [id number], [id number], [id number], [id number]  (Send-Out)            Polycystic ovaries           Prescriptions:       Refill of: Metformin HCl 750mg Tablets, Extended Release 1 tab daily  #90 (Ninety) tablet(s) Refills: 1       Refill of: Norethindrone Acetate (Norethindrone Acetate)  0.35mg Tablet Take 1 tablet(s) by mouth daily as directed.  #3 (Three) package Refills: 3          Tobacco use disorder           Prescriptions:       Refill of: Chantix Continuing Month Box (Varenicline Tartrate) 1mg Tablet 1 tablet twice daily  #56 (Fifty Six) tablet(s) Refills: 1          Bacterial vaginitis           Prescriptions:        Refill of: MetroGel (Metronidazole) 0.75% Vaginal Gel Insert 1 applicatorful(s) in vagina at bedtime for 7 days  #70 (Seventy) gm Refills: 0          Skin nodule           Prescriptions:       Bactrim DS (Trimethoprim/Sulfamethoxazole ) Tablet Take 1 tablet(s) by mouth q12h for 14 days  #28 (Twenty Eight) tablet(s) Refills: 0       Nystatin 100,000U/1gm Topical Powder Apply twice per day until rash cleared, then as needed  #60 (Sixty) gm Refills: 2             Patient Recommendations:        For  Acquired hypothyroidism:             The following laboratory testing has been ordered:             CHARGE CAPTURE:           Primary Diagnosis:     244.8 Acquired hypothyroidism            E03.9    Hypothyroidism, unspecified              Orders:          31877   Office/outpatient visit; established patient, level 4  (In-House)           256.4 Polycystic ovaries            E28.2    Polycystic ovarian syndrome    305.1 Tobacco use disorder            F17.200    Nicotine dependence, unspecified, uncomplicated    616.10 Bacterial vaginitis            N76.0    Acute vaginitis    782.2 Skin nodule            R22.9    Localized swelling, mass and lump, unspecified

## 2021-06-08 VITALS
BODY MASS INDEX: 39.24 KG/M2 | DIASTOLIC BLOOD PRESSURE: 78 MMHG | WEIGHT: 250 LBS | TEMPERATURE: 98.4 F | HEART RATE: 90 BPM | HEIGHT: 67 IN | SYSTOLIC BLOOD PRESSURE: 131 MMHG | OXYGEN SATURATION: 98 % | RESPIRATION RATE: 20 BRPM

## 2021-06-08 NOTE — H&P
History and Physical      Patient Name: Aurora Mendez   Patient ID: [id number]   Sex: Female   YOB: 1980    Primary Care Provider: Abimbola ARCEO   Referring Provider: Abimbola ARCEO    Visit Date: September 26, 2018    Provider: Patrice Ortiz MD   Location: [facility]   Location Address: [address] [place] [address]  [address] [place] [address]  [id number]   Location Phone: [phone]          Chief Complaint     1.  Throat pain    2.  Tonsillitis       History Of Present Illness  Aurora Mendez is a 37 year old /White female who presents to the office today as a consult from Abimbola ARCEO.      She presents the clinic today for evaluation of throat pain and chronic tonsillitis.  She states that she has had lifelong issues with her tonsils, and remembers having frequent strep throat and tonsillitis infections as a child.  As an adult she has had significant issues with recurrent throat pain.  She has had no significant changes to her voice, and has had no trouble with swallowing during these episodes.  She has been on multiple courses of antibiotics, and does not seem to get better.  Her brother had similar issues, and improved significant after having his tonsils out.    She states that she is having throat pain today.  She did undergo an adenoidectomy as a very young child.       Past Medical History  Depression with anxiety; Swollen tonsil; Vitamin D Deficiency         Past Surgical History  Hysterectomy         Medication List  Calcium 600 + D(3) 600 mg calcium- 200 unit oral capsule; Chantix 0.5 mg oral tablet; levothyroxine 125 mcg oral tablet; metformin 750 mg oral tablet extended release 24 hr; norethindrone (contraceptive) 0.35 mg oral tablet         Allergy List  NO KNOWN DRUG ALLERGIES         Family Medical History  Cancer, Unspecified; Diabetes; Stroke         Social History  Tobacco (Former)         Review of  "Systems  · Constitutional  o Denies  o : fever, night sweats, weight loss  · Eyes  o Denies  o : discharge from eye, impaired vision  · HENT  o Admits  o : *See HPI  · Cardiovascular  o Denies  o : chest pain, irregular heart beats  · Respiratory  o Denies  o : shortness of breath, wheezing, coughing up blood  · Gastrointestinal  o Denies  o : heartburn, reflux, vomiting blood  · Genitourinary  o Denies  o : frequency  · Integument  o Denies  o : rash, skin dryness  · Neurologic  o Denies  o : seizures, loss of balance, loss of consciousness, dizziness  · Endocrine  o Denies  o : cold intolerance, heat intolerance  · Heme-Lymph  o Denies  o : easy bleeding, anemia      Vitals  Date Time BP Position Site L\R Cuff Size HR RR TEMP(F) WT  HT  BMI kg/m2 BSA m2 O2 Sat HC       09/26/2018 01:15 /78 Sitting    90 - R 20 98.4 250lbs 0oz 5'  7\" 39.16 2.32 98 %           Physical Examination  · Constitutional  o Appearance  o : well developed, well-nourished, alert and in no acute distress, voice clear and strong  · Head and Face  o Head  o :   § Inspection  § : no deformities or lesions  o Face  o :   § Inspection  § : No facial lesions; House-Brackmann I/VI bilaterally  § Palpation  § : No TMJ crepitus nor  muscle tenderness bilaterally  · Eyes  o Vision  o :   § Visual Fields  § : Extraocular movements are intact. No spontaneous or gaze-induced nystagmus.  o Conjunctivae  o : clear  o Sclerae  o : clear  o Pupils and Irises  o : pupils equal, round, and reactive to light.   · Ears, Nose, Mouth and Throat  o Ears  o :   § External Ears  § : appearance within normal limits, no lesions present  § Otoscopic Examination  § : tympanic membrane appearance within normal limits bilaterally without perforations, well-aerated middle ears  § Hearing  § : intact to conversational voice both ears  o Nose  o :   § External Nose  § : appearance normal  § Intranasal Exam  § : mucosa within normal limits, vestibules normal, " no intranasal lesions present, septum midline, sinuses non tender to percussion  o Oral Cavity  o :   § Oral Mucosa  § : oral mucosa normal without pallor or cyanosis  § Lips  § : lip appearance normal  § Teeth  § : normal dentition for age  § Gums  § : gums pink, non-swollen, no bleeding present  § Tongue  § : tongue appearance normal; normal mobility  § Palate  § : hard palate normal, soft palate appearance normal with symmetric mobility  o Throat  o :   § Oropharynx  § : no inflammation or lesions present, tonsils 3+, cryptic, chronically infected appearing, right tonsil tender on palpation  § Hypopharynx  § : appearance within normal limits, superior epiglottis within normal limits  § Larynx  § : appearance within normal limits, vocal cords within normal limits, no lesions present  · Neck  o Inspection/Palpation  o : normal appearance, no masses or tenderness, trachea midline; thyroid size normal, nontender, no nodules or masses present on palpation  · Respiratory  o Respiratory Effort  o : breathing unlabored  o Inspection of Chest  o : normal appearance, no retractions  · Cardiovascular  o Heart  o : regular rate and rhythm  · Lymphatic  o Neck  o : no lymphadenopathy present  o Supraclavicular Nodes  o : no lymphadenopathy present  o Preauricular Nodes  o : no lymphadenopathy present  · Skin and Subcutaneous Tissue  o General Inspection  o : Regarding face and neck - there are no rashes present, no lesions present, and no areas of discoloration  · Neurologic  o Cranial Nerves  o : cranial nerves II-XII are grossly intact bilaterally  o Gait and Station  o : normal gait, able to stand without diffculty  · Psychiatric  o Judgement and Insight  o : judgment and insight intact  o Mood and Affect  o : mood normal, affect appropriate          Assessment  · Pre-Surgical Orders     V72.84/Z01.818  · Recurrent tonsillitis     463/J03.91  · Sore throat     462/J02.9      Plan  · Orders  o General Surgery Order (GENOR)  - V72.84/Z01.818 - 09/26/2018  · Instructions  o PLAN: Tonsillectomy, adenoidectomy  o Handouts Provided-Pre-Procedure Instructions including date and time and location of procedure.  o ****Surgical Orders****  o ****Patient Status****  o Outpatient  o ********************  o RISK AND BENEFITS: Reaction to anesthesia, pain, swelling, bleeding, infection, damage to surrounding structures, need for further procedures, continued throat pain, scar.  o Consent for surgery: Given these options, the patient has verbally expressed an understanding of the risks of surgery and finds these risks acceptable. We will proceed with surgery as soon as possible.  o Consult Anesthesia for a post-operative block, or any pain management procedure deemed necessary by the anesthesiologist for adequate post-operative pain control.   o O.R. PREP: Per protocol  o IV: Per Anesthesia  o PLEASE SIGN PERMIT FOR: Tonsillectomy, adenoidectomy  o *___The above History and Physical Examination has been completed within 30 days of admission.  o She presents the clinic today for evaluation of throat pain and chronic tonsillitis. She states that she has had lifelong issues with her tonsils, and remembers having frequent strep throat and tonsillitis infections as a child. As an adult she has had significant issues with recurrent throat pain. She has had no significant changes to her voice, and has had no trouble with swallowing during these episodes. She has been on multiple courses of antibiotics, and does not seem to get better. Her brother had similar issues, and improved significant after having his tonsils out.She states that she is having throat pain today. She did undergo an adenoidectomy as a very young child. On examination, her tonsils are mildly enlarged, are cryptic, and chronically infected appearing. On palpation, the right tonsil is very tender. I discussed treatment options with her, and she would strongly like to proceed with  tonsillectomy and adenoidectomy. I discussed the procedure at length, including the possible complications, and she states that she understands and would like to proceed. I will make arrangements to have this scheduled for her in the near future.  · Correspondence  o ENT Letter to Referring MD (Abimbola ARCEO) - 09/26/2018            Electronically Signed by: Patrice Ortiz MD -Author on September 26, 2018 01:46:30 PM

## 2021-06-22 DIAGNOSIS — E78.5 HYPERLIPIDEMIA, UNSPECIFIED HYPERLIPIDEMIA TYPE: Primary | ICD-10-CM

## 2021-06-22 RX ORDER — BUSPIRONE HYDROCHLORIDE 15 MG/1
1 TABLET ORAL 2 TIMES DAILY
COMMUNITY
Start: 2021-05-15 | End: 2021-06-30 | Stop reason: SDUPTHER

## 2021-06-22 RX ORDER — ATORVASTATIN CALCIUM 40 MG/1
40 TABLET, FILM COATED ORAL NIGHTLY
Qty: 90 TABLET | Refills: 1 | Status: SHIPPED | OUTPATIENT
Start: 2021-06-22 | End: 2021-09-20

## 2021-06-22 RX ORDER — LEVOTHYROXINE SODIUM 0.12 MG/1
1 TABLET ORAL DAILY
COMMUNITY
End: 2021-11-03 | Stop reason: SDUPTHER

## 2021-06-22 RX ORDER — BUPROPION HYDROCHLORIDE 300 MG/1
300 TABLET ORAL DAILY
COMMUNITY
Start: 2021-06-03 | End: 2021-06-30 | Stop reason: SDUPTHER

## 2021-06-22 RX ORDER — ATORVASTATIN CALCIUM 40 MG/1
1 TABLET, FILM COATED ORAL NIGHTLY
COMMUNITY
Start: 2021-06-19 | End: 2021-06-22 | Stop reason: SDUPTHER

## 2021-06-22 RX ORDER — BUPROPION HYDROCHLORIDE 150 MG/1
150 TABLET ORAL DAILY
COMMUNITY
Start: 2021-06-03 | End: 2021-06-30 | Stop reason: SDUPTHER

## 2021-06-22 RX ORDER — LAMOTRIGINE 100 MG/1
1 TABLET ORAL 2 TIMES DAILY
COMMUNITY
Start: 2021-05-21 | End: 2021-06-30 | Stop reason: SDUPTHER

## 2021-06-22 RX ORDER — ACETAMINOPHEN AND CODEINE PHOSPHATE 120; 12 MG/5ML; MG/5ML
1 SOLUTION ORAL DAILY
COMMUNITY
End: 2021-10-21 | Stop reason: SDUPTHER

## 2021-06-22 RX ORDER — METFORMIN HYDROCHLORIDE 500 MG/1
1 TABLET, EXTENDED RELEASE ORAL 2 TIMES DAILY
COMMUNITY
Start: 2021-05-21 | End: 2021-08-03 | Stop reason: SDUPTHER

## 2021-06-24 DIAGNOSIS — Z12.31 ENCOUNTER FOR SCREENING MAMMOGRAM FOR MALIGNANT NEOPLASM OF BREAST: Primary | ICD-10-CM

## 2021-06-30 DIAGNOSIS — F33.2 MAJOR DEPRESSIVE DISORDER, RECURRENT SEVERE WITHOUT PSYCHOTIC FEATURES (HCC): ICD-10-CM

## 2021-06-30 DIAGNOSIS — F43.21 ADJUSTMENT DISORDER WITH DEPRESSED MOOD: Primary | ICD-10-CM

## 2021-06-30 RX ORDER — BUPROPION HYDROCHLORIDE 300 MG/1
300 TABLET ORAL DAILY
Qty: 90 TABLET | Refills: 0 | Status: SHIPPED | OUTPATIENT
Start: 2021-06-30 | End: 2022-05-04 | Stop reason: SDUPTHER

## 2021-06-30 RX ORDER — BUPROPION HYDROCHLORIDE 150 MG/1
150 TABLET ORAL DAILY
Qty: 90 TABLET | Refills: 0 | Status: SHIPPED | OUTPATIENT
Start: 2021-06-30 | End: 2021-11-03

## 2021-06-30 RX ORDER — BUSPIRONE HYDROCHLORIDE 15 MG/1
15 TABLET ORAL 2 TIMES DAILY
Qty: 180 TABLET | Refills: 0 | Status: SHIPPED | OUTPATIENT
Start: 2021-06-30 | End: 2022-05-04 | Stop reason: SDUPTHER

## 2021-06-30 RX ORDER — LAMOTRIGINE 100 MG/1
100 TABLET ORAL 2 TIMES DAILY
Qty: 180 TABLET | Refills: 0 | Status: SHIPPED | OUTPATIENT
Start: 2021-06-30 | End: 2021-11-03 | Stop reason: SDUPTHER

## 2021-07-01 VITALS
HEIGHT: 67 IN | TEMPERATURE: 98.6 F | BODY MASS INDEX: 40.12 KG/M2 | SYSTOLIC BLOOD PRESSURE: 133 MMHG | DIASTOLIC BLOOD PRESSURE: 86 MMHG | WEIGHT: 255.6 LBS | HEART RATE: 97 BPM

## 2021-07-01 VITALS
BODY MASS INDEX: 39.08 KG/M2 | WEIGHT: 249 LBS | DIASTOLIC BLOOD PRESSURE: 86 MMHG | TEMPERATURE: 98.5 F | HEART RATE: 99 BPM | SYSTOLIC BLOOD PRESSURE: 133 MMHG | HEIGHT: 67 IN

## 2021-07-01 VITALS
WEIGHT: 248.8 LBS | HEART RATE: 97 BPM | TEMPERATURE: 98.8 F | SYSTOLIC BLOOD PRESSURE: 123 MMHG | HEIGHT: 67 IN | DIASTOLIC BLOOD PRESSURE: 67 MMHG | BODY MASS INDEX: 39.05 KG/M2

## 2021-07-01 VITALS
BODY MASS INDEX: 39.36 KG/M2 | DIASTOLIC BLOOD PRESSURE: 82 MMHG | WEIGHT: 250.8 LBS | SYSTOLIC BLOOD PRESSURE: 113 MMHG | TEMPERATURE: 98.2 F | HEART RATE: 101 BPM | HEIGHT: 67 IN

## 2021-07-01 VITALS
SYSTOLIC BLOOD PRESSURE: 115 MMHG | HEART RATE: 81 BPM | HEIGHT: 67 IN | BODY MASS INDEX: 38.96 KG/M2 | TEMPERATURE: 97.3 F | WEIGHT: 248.2 LBS | DIASTOLIC BLOOD PRESSURE: 78 MMHG

## 2021-07-01 VITALS
WEIGHT: 250.6 LBS | BODY MASS INDEX: 39.33 KG/M2 | SYSTOLIC BLOOD PRESSURE: 113 MMHG | HEIGHT: 67 IN | DIASTOLIC BLOOD PRESSURE: 72 MMHG | TEMPERATURE: 98.4 F | HEART RATE: 118 BPM

## 2021-07-01 VITALS
DIASTOLIC BLOOD PRESSURE: 72 MMHG | WEIGHT: 252.2 LBS | HEART RATE: 99 BPM | BODY MASS INDEX: 39.58 KG/M2 | TEMPERATURE: 98.2 F | HEIGHT: 67 IN | SYSTOLIC BLOOD PRESSURE: 136 MMHG

## 2021-07-01 VITALS
HEIGHT: 67 IN | BODY MASS INDEX: 40.46 KG/M2 | SYSTOLIC BLOOD PRESSURE: 125 MMHG | WEIGHT: 257.8 LBS | HEART RATE: 95 BPM | DIASTOLIC BLOOD PRESSURE: 83 MMHG | TEMPERATURE: 98.4 F

## 2021-07-01 VITALS
WEIGHT: 252.4 LBS | BODY MASS INDEX: 39.62 KG/M2 | SYSTOLIC BLOOD PRESSURE: 141 MMHG | DIASTOLIC BLOOD PRESSURE: 86 MMHG | HEART RATE: 86 BPM | HEIGHT: 67 IN | TEMPERATURE: 98.3 F

## 2021-07-02 VITALS
DIASTOLIC BLOOD PRESSURE: 85 MMHG | HEIGHT: 67 IN | SYSTOLIC BLOOD PRESSURE: 135 MMHG | HEART RATE: 98 BPM | WEIGHT: 259 LBS | BODY MASS INDEX: 40.65 KG/M2 | TEMPERATURE: 98.3 F

## 2021-07-02 VITALS
DIASTOLIC BLOOD PRESSURE: 87 MMHG | TEMPERATURE: 97.5 F | SYSTOLIC BLOOD PRESSURE: 132 MMHG | HEART RATE: 89 BPM | BODY MASS INDEX: 41.12 KG/M2 | WEIGHT: 262 LBS | HEIGHT: 67 IN

## 2021-07-02 VITALS
BODY MASS INDEX: 41.53 KG/M2 | SYSTOLIC BLOOD PRESSURE: 143 MMHG | HEART RATE: 104 BPM | HEIGHT: 67 IN | TEMPERATURE: 96.9 F | DIASTOLIC BLOOD PRESSURE: 85 MMHG | WEIGHT: 264.6 LBS

## 2021-07-02 VITALS
HEIGHT: 67 IN | DIASTOLIC BLOOD PRESSURE: 73 MMHG | HEART RATE: 105 BPM | BODY MASS INDEX: 38.64 KG/M2 | WEIGHT: 246.2 LBS | SYSTOLIC BLOOD PRESSURE: 130 MMHG | TEMPERATURE: 98.4 F

## 2021-07-02 VITALS
HEART RATE: 92 BPM | HEIGHT: 67 IN | BODY MASS INDEX: 39.33 KG/M2 | TEMPERATURE: 98 F | DIASTOLIC BLOOD PRESSURE: 78 MMHG | WEIGHT: 250.6 LBS | SYSTOLIC BLOOD PRESSURE: 139 MMHG

## 2021-07-02 VITALS
SYSTOLIC BLOOD PRESSURE: 137 MMHG | WEIGHT: 248.4 LBS | HEART RATE: 93 BPM | BODY MASS INDEX: 38.99 KG/M2 | HEIGHT: 67 IN | TEMPERATURE: 97.7 F | DIASTOLIC BLOOD PRESSURE: 82 MMHG

## 2021-07-02 VITALS
HEIGHT: 67 IN | SYSTOLIC BLOOD PRESSURE: 139 MMHG | HEART RATE: 116 BPM | BODY MASS INDEX: 41.69 KG/M2 | DIASTOLIC BLOOD PRESSURE: 97 MMHG | TEMPERATURE: 97.3 F | WEIGHT: 265.6 LBS

## 2021-08-03 ENCOUNTER — TELEPHONE (OUTPATIENT)
Dept: FAMILY MEDICINE CLINIC | Age: 41
End: 2021-08-03

## 2021-08-03 ENCOUNTER — OFFICE VISIT (OUTPATIENT)
Dept: FAMILY MEDICINE CLINIC | Age: 41
End: 2021-08-03

## 2021-08-03 VITALS
DIASTOLIC BLOOD PRESSURE: 68 MMHG | TEMPERATURE: 98.5 F | OXYGEN SATURATION: 95 % | SYSTOLIC BLOOD PRESSURE: 120 MMHG | WEIGHT: 248.2 LBS | BODY MASS INDEX: 38.96 KG/M2 | HEART RATE: 88 BPM | HEIGHT: 67 IN

## 2021-08-03 DIAGNOSIS — J20.8 ACUTE BRONCHITIS DUE TO OTHER SPECIFIED ORGANISMS: ICD-10-CM

## 2021-08-03 DIAGNOSIS — U07.1 COVID-19: ICD-10-CM

## 2021-08-03 DIAGNOSIS — J06.9 UPPER RESPIRATORY TRACT INFECTION, UNSPECIFIED TYPE: Primary | ICD-10-CM

## 2021-08-03 PROBLEM — F41.8 DEPRESSION WITH ANXIETY: Status: ACTIVE | Noted: 2021-08-03

## 2021-08-03 PROBLEM — E55.9 VITAMIN D DEFICIENCY: Status: ACTIVE | Noted: 2021-08-03

## 2021-08-03 LAB
EXPIRATION DATE: NORMAL
INTERNAL CONTROL: NORMAL
Lab: NORMAL
S PYO AG THROAT QL: NEGATIVE

## 2021-08-03 PROCEDURE — 87880 STREP A ASSAY W/OPTIC: CPT | Performed by: NURSE PRACTITIONER

## 2021-08-03 PROCEDURE — 87081 CULTURE SCREEN ONLY: CPT | Performed by: NURSE PRACTITIONER

## 2021-08-03 PROCEDURE — 99213 OFFICE O/P EST LOW 20 MIN: CPT | Performed by: NURSE PRACTITIONER

## 2021-08-03 RX ORDER — AZITHROMYCIN 250 MG/1
TABLET, FILM COATED ORAL
Qty: 6 TABLET | Refills: 0 | Status: SHIPPED | OUTPATIENT
Start: 2021-08-03 | End: 2021-11-03

## 2021-08-03 RX ORDER — METFORMIN HYDROCHLORIDE 500 MG/1
500 TABLET, EXTENDED RELEASE ORAL 2 TIMES DAILY
Qty: 60 TABLET | Refills: 1 | Status: SHIPPED | OUTPATIENT
Start: 2021-08-03 | End: 2021-10-07 | Stop reason: SDUPTHER

## 2021-08-03 RX ORDER — ALBUTEROL SULFATE 90 UG/1
2 AEROSOL, METERED RESPIRATORY (INHALATION) EVERY 4 HOURS PRN
Qty: 8.5 G | Refills: 0 | Status: SHIPPED | OUTPATIENT
Start: 2021-08-03 | End: 2022-05-04

## 2021-08-03 RX ORDER — DEXTROMETHORPHAN HYDROBROMIDE AND PROMETHAZINE HYDROCHLORIDE 15; 6.25 MG/5ML; MG/5ML
5 SYRUP ORAL 4 TIMES DAILY PRN
Qty: 180 ML | Refills: 0 | Status: SHIPPED | OUTPATIENT
Start: 2021-08-03 | End: 2021-11-03

## 2021-08-03 NOTE — TELEPHONE ENCOUNTER
Caller: Aurora Mendez    Relationship to patient: Self    Best call back number: [phone]     Patient is needing: PATIENT WAS SEEN TODAY (08/03/21) AT 11:30 AM BY DAVON SOSA AND WAS PRESCRIBED AN ANTIBIOTIC, COUGH MEDICATION, AND AN INHALER. PATIENT IS COVID POSITIVE.    PATIENT CALLED AND STATED THAT THE PHARMACY DOES NOT HAVE THIS PRESCRIPTION YET.     DUE TO THE PATIENT BEING COVID POSITIVE AND ACTIVELY WHEEZING OVER THE PHONE, AN ATTEMPT WAS MADE TO WARM TRANSFER. I WAS UNABLE TO REACH THE OFFICE.     PLEASE CALL AND ADVISE PATIENT WHEN THESE PRESCRIPTIONS HAVE BEEN SENT.      [facility] - [place], [place] - [address] - [phone]  - [phone]   [phone]

## 2021-08-03 NOTE — ASSESSMENT & PLAN NOTE
Rapid strep test is negative. Lungs are clear but does present with some bronchospasm moderate cough with deep inspiration. We will treat for bronchitis. No current wheezing but will consider adding prednisone later on this week if symptoms are not improving on Zithromax, albuterol inhaler, and Promethazine DM which may cause drowsiness. Do recommend that she remain off work the rest of this week with return to work Monday also consider chest x-ray if symptoms or not improving with this treatment for bronchitis. She states she is completed her 10-day quarantine as of today.

## 2021-08-03 NOTE — PROGRESS NOTES
"Chief Complaint  COVID POSITIVE (Tested positive on 7/26/21. Cough, sore throat, body aches, feverish, shortness of breath, diarrhea. Finished 10 day quarantine, and supposed to report back to work but is still symptomatic. )    Sherine Simon is a 40-year-old female here today for upper respiratory symptoms that are persisting since her diagnosis with COVID-19 [facility] [place]. Headache and fever were her most concerning symptoms on day of diagnosis and fever resolved after 3 to 4 days and she has had no further problems with headache. Sore throat is most bothersome with cough postnasal drip and she is coughing up some yellow mucus. She has no current sense of taste or smell and is continuing with some myalgia but improved myalgia. [facility] she received an injection for headache relief and has been taking ibuprofen and Tylenol and NyQuil at home and is scheduled to return to work tomorrow but does not feel up to returning to work. She denies any respiratory distress or chest pain        Aurora Mendez presents to [facility]    Review of Systems   Constitutional: Positive for fatigue.   HENT: Positive for congestion, postnasal drip and sore throat.    Respiratory: Positive for cough. Negative for wheezing.    Gastrointestinal: Negative.    Musculoskeletal: Positive for myalgias. Negative for back pain.   Neurological: Negative.    Psychiatric/Behavioral: Negative.         Objective   Vital Signs:   Vitals:    08/03/21 1150   BP: 120/68   BP Location: Right arm   Patient Position: Sitting   Cuff Size: Adult   Pulse: 88   Temp: 98.5 °F (36.9 °C)   TempSrc: Oral   SpO2: 95%  Comment: room air   Weight: 113 kg (248 lb 3.2 oz)   Height: 168.9 cm (66.5\")      Physical Exam  Constitutional:       Appearance: Normal appearance. She is obese.   HENT:      Right Ear: Tympanic membrane normal.      Left Ear: Tympanic membrane normal.     "  Mouth/Throat:      Mouth: Mucous membranes are moist.      Pharynx: Posterior oropharyngeal erythema present.   Cardiovascular:      Rate and Rhythm: Normal rate and regular rhythm.      Heart sounds: Normal heart sounds.   Pulmonary:      Effort: Pulmonary effort is normal.      Breath sounds: Normal breath sounds.   Musculoskeletal:         General: Normal range of motion.   Skin:     General: Skin is warm and dry.   Neurological:      General: No focal deficit present.      Mental Status: She is alert and oriented to person, place, and time.   Psychiatric:         Mood and Affect: Mood normal.         Behavior: Behavior normal.          Result Review :                Assessment and Plan    Diagnoses and all orders for this visit:    1. Upper respiratory tract infection, unspecified type (Primary)  -     POCT rapid strep A  -     Beta Strep Culture, Throat - , Throat; Future  -     Beta Strep Culture, Throat - Swab, Throat    2. Acute bronchitis due to other specified organisms  Assessment & Plan:  Rapid strep test is negative. Lungs are clear but does present with some bronchospasm moderate cough with deep inspiration. We will treat for bronchitis. No current wheezing but will consider adding prednisone later on this week if symptoms are not improving on Zithromax, albuterol inhaler, and Promethazine DM which may cause drowsiness. Do recommend that she remain off work the rest of this week with return to work Monday also consider chest x-ray if symptoms or not improving with this treatment for bronchitis. She states she is completed her 10-day quarantine as of today.      3. COVID-19      Follow Up    No follow-ups on file.  Patient was given instructions and counseling regarding her condition or for health maintenance advice. Please see specific information pulled into the AVS if appropriate.

## 2021-08-05 LAB — BACTERIA SPEC AEROBE CULT: NORMAL

## 2021-09-01 ENCOUNTER — TELEPHONE (OUTPATIENT)
Dept: FAMILY MEDICINE CLINIC | Age: 41
End: 2021-09-01

## 2021-09-01 NOTE — TELEPHONE ENCOUNTER
[facility] pt was called once in June to set up mammogram and she said she would call back to schedule and has yet to do so referrals will you attempt also to schedule for the pt

## 2021-09-14 ENCOUNTER — TRANSCRIBE ORDERS (OUTPATIENT)
Dept: ADMINISTRATIVE | Facility: HOSPITAL | Age: 41
End: 2021-09-14

## 2021-09-14 DIAGNOSIS — Z12.31 BREAST CANCER SCREENING BY MAMMOGRAM: Primary | ICD-10-CM

## 2021-10-07 NOTE — TELEPHONE ENCOUNTER
Caller: Aurora Mendez    Relationship: Self      Medication requested (name and dosage): metFORMIN ER (GLUCOPHAGE-XR) 500 MG 24 hr tablet    Pharmacy where request should be sent: [facility] - [place], [place] - [address] [phone] [facility] [phone]        Best call back number: *[phone]    Does the patient have less than a 3 day supply:  [x] Yes  [] No    Roberta Gomes Rep   10/07/21 14:36 EDT

## 2021-10-12 RX ORDER — METFORMIN HYDROCHLORIDE 500 MG/1
500 TABLET, EXTENDED RELEASE ORAL 2 TIMES DAILY
Qty: 60 TABLET | Refills: 0 | Status: SHIPPED | OUTPATIENT
Start: 2021-10-12 | End: 2021-11-03 | Stop reason: SDUPTHER

## 2021-10-12 NOTE — TELEPHONE ENCOUNTER
Hub staff attempted to follow warm transfer process and was unsuccessful     Caller: Aurora Mendez    Relationship to patient: Self    Best call back number: [phone]    Patient is needing: THE PATIENT IS OUT OF MEDICATION AND NEEDS A REFILL ASAP AND WOULD LIKE A CALL BACK WHEN THIS HAS BEEN SENT TO THE PHARMACY

## 2021-10-21 RX ORDER — ACETAMINOPHEN AND CODEINE PHOSPHATE 120; 12 MG/5ML; MG/5ML
1 SOLUTION ORAL DAILY
Qty: 28 TABLET | Refills: 6 | Status: SHIPPED | OUTPATIENT
Start: 2021-10-21 | End: 2021-11-03 | Stop reason: SDUPTHER

## 2021-11-02 RX ORDER — LEVOTHYROXINE SODIUM 0.12 MG/1
125 TABLET ORAL DAILY
Qty: 90 TABLET | Status: CANCELLED | OUTPATIENT
Start: 2021-11-02

## 2021-11-03 ENCOUNTER — OFFICE VISIT (OUTPATIENT)
Dept: FAMILY MEDICINE CLINIC | Age: 41
End: 2021-11-03

## 2021-11-03 ENCOUNTER — APPOINTMENT (OUTPATIENT)
Dept: MAMMOGRAPHY | Facility: HOSPITAL | Age: 41
End: 2021-11-03

## 2021-11-03 VITALS
HEIGHT: 66 IN | DIASTOLIC BLOOD PRESSURE: 89 MMHG | HEART RATE: 83 BPM | WEIGHT: 257.4 LBS | BODY MASS INDEX: 41.37 KG/M2 | TEMPERATURE: 98 F | SYSTOLIC BLOOD PRESSURE: 127 MMHG

## 2021-11-03 DIAGNOSIS — E03.9 HYPOTHYROIDISM, UNSPECIFIED TYPE: Chronic | ICD-10-CM

## 2021-11-03 DIAGNOSIS — Z13.6 SCREENING FOR CARDIOVASCULAR CONDITION: ICD-10-CM

## 2021-11-03 DIAGNOSIS — E28.2 PCOS (POLYCYSTIC OVARIAN SYNDROME): Chronic | ICD-10-CM

## 2021-11-03 DIAGNOSIS — E78.5 HYPERLIPIDEMIA, UNSPECIFIED HYPERLIPIDEMIA TYPE: Chronic | ICD-10-CM

## 2021-11-03 DIAGNOSIS — F43.21 ADJUSTMENT DISORDER WITH DEPRESSED MOOD: Primary | Chronic | ICD-10-CM

## 2021-11-03 DIAGNOSIS — F41.8 DEPRESSION WITH ANXIETY: Chronic | ICD-10-CM

## 2021-11-03 PROCEDURE — 99214 OFFICE O/P EST MOD 30 MIN: CPT | Performed by: NURSE PRACTITIONER

## 2021-11-03 RX ORDER — BUPROPION HYDROCHLORIDE 300 MG/1
300 TABLET ORAL EVERY MORNING
Qty: 90 TABLET | Refills: 1 | Status: SHIPPED | OUTPATIENT
Start: 2021-11-03 | End: 2022-05-04 | Stop reason: SDUPTHER

## 2021-11-03 RX ORDER — MELATONIN
1000 DAILY
COMMUNITY

## 2021-11-03 RX ORDER — BUSPIRONE HYDROCHLORIDE 15 MG/1
15 TABLET ORAL 2 TIMES DAILY
Qty: 180 TABLET | Refills: 1 | Status: SHIPPED | OUTPATIENT
Start: 2021-11-03 | End: 2022-05-04 | Stop reason: SDUPTHER

## 2021-11-03 RX ORDER — LEVOTHYROXINE SODIUM 0.12 MG/1
125 TABLET ORAL DAILY
Qty: 90 TABLET | Refills: 1 | Status: SHIPPED | OUTPATIENT
Start: 2021-11-03 | End: 2022-05-04 | Stop reason: SDUPTHER

## 2021-11-03 RX ORDER — ATORVASTATIN CALCIUM 40 MG/1
40 TABLET, FILM COATED ORAL NIGHTLY
COMMUNITY
Start: 2021-10-01 | End: 2021-11-03 | Stop reason: SDUPTHER

## 2021-11-03 RX ORDER — METFORMIN HYDROCHLORIDE 500 MG/1
500 TABLET, EXTENDED RELEASE ORAL 2 TIMES DAILY
Qty: 180 TABLET | Refills: 1 | Status: SHIPPED | OUTPATIENT
Start: 2021-11-03 | End: 2022-05-04 | Stop reason: SDUPTHER

## 2021-11-03 RX ORDER — ATORVASTATIN CALCIUM 40 MG/1
40 TABLET, FILM COATED ORAL NIGHTLY
Qty: 90 TABLET | Refills: 1 | Status: SHIPPED | OUTPATIENT
Start: 2021-11-03 | End: 2022-08-04

## 2021-11-03 RX ORDER — BUPROPION HYDROCHLORIDE 300 MG/1
300 TABLET ORAL EVERY MORNING
COMMUNITY
End: 2021-11-03

## 2021-11-03 RX ORDER — LAMOTRIGINE 100 MG/1
100 TABLET ORAL 2 TIMES DAILY
Qty: 180 TABLET | Refills: 1 | Status: SHIPPED | OUTPATIENT
Start: 2021-11-03 | End: 2021-11-05 | Stop reason: SDUPTHER

## 2021-11-03 RX ORDER — ACETAMINOPHEN AND CODEINE PHOSPHATE 120; 12 MG/5ML; MG/5ML
1 SOLUTION ORAL DAILY
Qty: 84 TABLET | Refills: 4 | Status: SHIPPED | OUTPATIENT
Start: 2021-11-03 | End: 2022-05-04 | Stop reason: SDUPTHER

## 2021-11-03 RX ORDER — BUSPIRONE HYDROCHLORIDE 15 MG/1
15 TABLET ORAL 2 TIMES DAILY
COMMUNITY
End: 2021-11-03 | Stop reason: SDUPTHER

## 2021-11-03 NOTE — PROGRESS NOTES
Chief Complaint  Aurora Mendez presents to [facility] for Diabetes, Anxiety, Depression, Hyperlipidemia, Hypothyroidism, and Med Refill    Subjective          Aurora presents today for follow up on anxiety.  She reports they are doing well on current treatment.  Current treatment includes :  She is currently under the care of Erwin sepulveda and BARBER Rajan - she reports that she is stable on the current treatment.  She would like for us to take over her medications, but continue with counseling.       She is also needing refills on her other medications.  She is doing well and is stable on the current treatment plan            No Known Allergies   Past Medical History:   Diagnosis Date   • Adjustment disorder with depressed mood    • Anogenital (venereal) warts    • Candidiasis of skin and nail    • Chest pain, unspecified    • Contact with and (suspected) exposure to covid-19    • Contact with and (suspected) exposure to other viral communicable diseases    • Dysthymic disorder    • Furuncle of other sites    • Generalized anxiety disorder    • Hypothyroidism, unspecified    • Major depressive disorder, recurrent severe without psychotic features (HCC)    • Mixed hyperlipidemia    • Neoplasm of uncertain behavior of skin    • Nicotine dependence, unspecified, uncomplicated    • Pain in left knee    • Pain in right knee    • Polycystic ovarian syndrome    • Psychophysiologic insomnia    • Pure hyperglyceridemia    • Strain of unspecified muscle, fascia and tendon at shoulder and upper arm level, left arm, initial encounter    • Vitamin D deficiency, unspecified      Current Outpatient Medications   Medication Sig Dispense Refill   • albuterol sulfate  (90 Base) MCG/ACT inhaler Inhale 2 puffs Every 4 (Four) Hours As Needed for Wheezing. 8.5 g 0   • atorvastatin (LIPITOR) 40 MG tablet Take 1 tablet by mouth Every Night. 90 tablet 1   • buPROPion XL (WELLBUTRIN XL) 300 MG  24 hr tablet Take 1 tablet by mouth Every Morning. 90 tablet 1   • busPIRone (BUSPAR) 15 MG tablet Take 1 tablet by mouth 2 (Two) Times a Day. 180 tablet 1   • cholecalciferol (VITAMIN D3) 25 MCG (1000 UT) tablet Take 1,000 Units by mouth Daily.     • levothyroxine (SYNTHROID, LEVOTHROID) 125 MCG tablet Take 1 tablet by mouth Daily. 90 tablet 1   • metFORMIN ER (GLUCOPHAGE-XR) 500 MG 24 hr tablet Take 1 tablet by mouth 2 (Two) Times a Day. 180 tablet 1   • norethindrone (MICRONOR) 0.35 MG tablet Take 1 tablet by mouth Daily. 84 tablet 4   • buPROPion XL (WELLBUTRIN XL) 300 MG 24 hr tablet Take 1 tablet by mouth Daily for 90 days. Take 1 tablet (300 mg) with 1 tablet (150 mg) daily to equal (450 mg ) 90 tablet 0   • busPIRone (BUSPAR) 15 MG tablet Take 1 tablet by mouth 2 (two) times a day for 90 days. 180 tablet 0   • lamoTRIgine (LaMICtal) 100 MG tablet Take 1 tablet by mouth 2 (Two) Times a Day. Needs appt  before next refill 180 tablet 0     No current facility-administered medications for this visit.     Past Surgical History:   Procedure Laterality Date   • HYSTERECTOMY      Hysterectomy: stage III dysplasia;       Social History     Tobacco Use   • Smoking status: Current Every Day Smoker     Packs/day: 0.50     Years: 27.00     Pack years: 13.50     Types: Cigarettes     Start date: 1994   • Smokeless tobacco: Never Used   Vaping Use   • Vaping Use: Never used   Substance Use Topics   • Alcohol use: Not Currently   • Drug use: Never     Comment: NEGATIVE      Family History   Problem Relation Age of Onset   • Other Mother          Neuroendocrine tumor - cause of death    • Pancreatic cancer Father         cause of death    • Heart attack Maternal Grandfather    • Coronary artery disease Paternal Grandmother    • Diabetes type II Paternal Grandmother    • Pneumonia Daughter      Health Maintenance Due   Topic Date Due   • ANNUAL PHYSICAL  Never done   • Pneumococcal Vaccine 0-64 (1 of 2 - PPSV23) Never done  "  • TDAP/TD VACCINES (1 - Tdap) Never done   • HEPATITIS C SCREENING  Never done   • INFLUENZA VACCINE  08/01/2021   • LIPID PANEL  10/28/2021   • COVID-19 Vaccine (2 - Pfizer 2-dose series) 10/29/2021      Immunization History   Administered Date(s) Administered   • COVID-19 (PFIZER) 10/08/2021   • Flu Vaccine Split Quad 10/30/2017   • Influenza, Unspecified 10/30/2017   • PPD Test 01/30/2007        Objective     Vitals:    11/03/21 1709 11/03/21 1716   BP: 137/95 127/89   Pulse: 85 83   Temp: 98 °F (36.7 °C)    TempSrc: Oral    Weight: 117 kg (257 lb 6.4 oz)    Height: 168.9 cm (66.5\")      Body mass index is 40.93 kg/m².     Physical Exam  Constitutional:       General: She is not in acute distress.     Appearance: Normal appearance.   HENT:      Head: Normocephalic.   Cardiovascular:      Rate and Rhythm: Normal rate and regular rhythm.   Pulmonary:      Effort: Pulmonary effort is normal.      Breath sounds: Normal breath sounds.   Musculoskeletal:         General: Normal range of motion.   Neurological:      General: No focal deficit present.      Mental Status: She is alert and oriented to person, place, and time.   Psychiatric:         Mood and Affect: Mood normal.         Behavior: Behavior normal.           Result Review :                               Assessment and Plan      Diagnoses and all orders for this visit:    1. Adjustment disorder with depressed mood (Primary)  Comments:  Continue current treatment.  She will continue counseling - should she feel like medication no longer working , will need referral back to NP  Orders:  -     Discontinue: lamoTRIgine (LaMICtal) 100 MG tablet; Take 1 tablet by mouth 2 (Two) Times a Day. Needs appt  before next refill  Dispense: 180 tablet; Refill: 1  -     lamoTRIgine (LaMICtal) 100 MG tablet; Take 1 tablet by mouth 2 (Two) Times a Day. Needs appt  before next refill  Dispense: 180 tablet; Refill: 0    2. Depression with anxiety  Comments:  continue current " treatment plan  Orders:  -     buPROPion XL (WELLBUTRIN XL) 300 MG 24 hr tablet; Take 1 tablet by mouth Every Morning.  Dispense: 90 tablet; Refill: 1  -     busPIRone (BUSPAR) 15 MG tablet; Take 1 tablet by mouth 2 (Two) Times a Day.  Dispense: 180 tablet; Refill: 1  -     Discontinue: lamoTRIgine (LaMICtal) 100 MG tablet; Take 1 tablet by mouth 2 (Two) Times a Day. Needs appt  before next refill  Dispense: 180 tablet; Refill: 1  -     lamoTRIgine (LaMICtal) 100 MG tablet; Take 1 tablet by mouth 2 (Two) Times a Day. Needs appt  before next refill  Dispense: 180 tablet; Refill: 0    3. Hyperlipidemia, unspecified hyperlipidemia type  Comments:  continue current treatment   Orders:  -     atorvastatin (LIPITOR) 40 MG tablet; Take 1 tablet by mouth Every Night.  Dispense: 90 tablet; Refill: 1    4. PCOS (polycystic ovarian syndrome)  Comments:  continue current treatment   Orders:  -     metFORMIN ER (GLUCOPHAGE-XR) 500 MG 24 hr tablet; Take 1 tablet by mouth 2 (Two) Times a Day.  Dispense: 180 tablet; Refill: 1  -     norethindrone (MICRONOR) 0.35 MG tablet; Take 1 tablet by mouth Daily.  Dispense: 84 tablet; Refill: 4    5. Hypothyroidism, unspecified type  Comments:  continue current treatment   Orders:  -     levothyroxine (SYNTHROID, LEVOTHROID) 125 MCG tablet; Take 1 tablet by mouth Daily.  Dispense: 90 tablet; Refill: 1  -     TSH; Future    6. Screening for cardiovascular condition  -     Comprehensive metabolic panel; Future  -     Lipid panel; Future              Follow Up     Return in about 6 months (around 5/3/2022).

## 2021-11-05 DIAGNOSIS — F43.21 ADJUSTMENT DISORDER WITH DEPRESSED MOOD: ICD-10-CM

## 2021-11-05 DIAGNOSIS — F41.8 DEPRESSION WITH ANXIETY: ICD-10-CM

## 2021-11-05 RX ORDER — LAMOTRIGINE 100 MG/1
100 TABLET ORAL 2 TIMES DAILY
Qty: 180 TABLET | Refills: 0 | Status: SHIPPED | OUTPATIENT
Start: 2021-11-05 | End: 2022-02-08 | Stop reason: SDUPTHER

## 2021-11-05 RX ORDER — LAMOTRIGINE 100 MG/1
100 TABLET ORAL 2 TIMES DAILY
Qty: 180 TABLET | Refills: 0 | Status: SHIPPED | OUTPATIENT
Start: 2021-11-05 | End: 2021-11-05 | Stop reason: SDUPTHER

## 2021-12-01 ENCOUNTER — LAB (OUTPATIENT)
Dept: LAB | Facility: HOSPITAL | Age: 41
End: 2021-12-01

## 2021-12-01 DIAGNOSIS — E03.9 HYPOTHYROIDISM, UNSPECIFIED TYPE: Chronic | ICD-10-CM

## 2021-12-01 DIAGNOSIS — Z13.6 SCREENING FOR CARDIOVASCULAR CONDITION: ICD-10-CM

## 2021-12-01 LAB
ALBUMIN SERPL-MCNC: 4.5 G/DL (ref 3.5–5.2)
ALBUMIN/GLOB SERPL: 1.7 G/DL
ALP SERPL-CCNC: 83 U/L (ref 39–117)
ALT SERPL W P-5'-P-CCNC: 14 U/L (ref 1–33)
ANION GAP SERPL CALCULATED.3IONS-SCNC: 10.2 MMOL/L (ref 5–15)
AST SERPL-CCNC: 13 U/L (ref 1–32)
BILIRUB SERPL-MCNC: 0.2 MG/DL (ref 0–1.2)
BUN SERPL-MCNC: 11 MG/DL (ref 6–20)
BUN/CREAT SERPL: 13.9 (ref 7–25)
CALCIUM SPEC-SCNC: 9.3 MG/DL (ref 8.6–10.5)
CHLORIDE SERPL-SCNC: 105 MMOL/L (ref 98–107)
CHOLEST SERPL-MCNC: 181 MG/DL (ref 0–200)
CO2 SERPL-SCNC: 23.8 MMOL/L (ref 22–29)
CREAT SERPL-MCNC: 0.79 MG/DL (ref 0.57–1)
GFR SERPL CREATININE-BSD FRML MDRD: 80 ML/MIN/1.73
GLOBULIN UR ELPH-MCNC: 2.6 GM/DL
GLUCOSE SERPL-MCNC: 93 MG/DL (ref 65–99)
HDLC SERPL-MCNC: 42 MG/DL (ref 40–60)
LDLC SERPL CALC-MCNC: 108 MG/DL (ref 0–100)
LDLC/HDLC SERPL: 2.48 {RATIO}
POTASSIUM SERPL-SCNC: 4.1 MMOL/L (ref 3.5–5.2)
PROT SERPL-MCNC: 7.1 G/DL (ref 6–8.5)
SODIUM SERPL-SCNC: 139 MMOL/L (ref 136–145)
TRIGL SERPL-MCNC: 175 MG/DL (ref 0–150)
TSH SERPL DL<=0.05 MIU/L-ACNC: 1.46 UIU/ML (ref 0.27–4.2)
VLDLC SERPL-MCNC: 31 MG/DL (ref 5–40)

## 2021-12-01 PROCEDURE — 80053 COMPREHEN METABOLIC PANEL: CPT

## 2021-12-01 PROCEDURE — 80061 LIPID PANEL: CPT

## 2021-12-01 PROCEDURE — 84443 ASSAY THYROID STIM HORMONE: CPT

## 2021-12-01 PROCEDURE — 36415 COLL VENOUS BLD VENIPUNCTURE: CPT

## 2022-02-08 DIAGNOSIS — F41.8 DEPRESSION WITH ANXIETY: Chronic | ICD-10-CM

## 2022-02-08 DIAGNOSIS — F43.21 ADJUSTMENT DISORDER WITH DEPRESSED MOOD: Chronic | ICD-10-CM

## 2022-02-08 RX ORDER — LAMOTRIGINE 100 MG/1
100 TABLET ORAL 2 TIMES DAILY
Qty: 180 TABLET | Refills: 0 | Status: SHIPPED | OUTPATIENT
Start: 2022-02-08 | End: 2022-05-04 | Stop reason: SDUPTHER

## 2022-02-26 ENCOUNTER — HOSPITAL ENCOUNTER (EMERGENCY)
Facility: HOSPITAL | Age: 42
Discharge: HOME | End: 2022-02-26
Attending: EMERGENCY MEDICINE | Admitting: EMERGENCY MEDICINE

## 2022-02-26 ENCOUNTER — APPOINTMENT (OUTPATIENT)
Dept: GENERAL RADIOLOGY | Facility: HOSPITAL | Age: 42
End: 2022-02-26

## 2022-02-26 VITALS
SYSTOLIC BLOOD PRESSURE: 138 MMHG | OXYGEN SATURATION: 98 % | WEIGHT: 253.53 LBS | HEART RATE: 75 BPM | BODY MASS INDEX: 39.79 KG/M2 | TEMPERATURE: 97.1 F | HEIGHT: 67 IN | DIASTOLIC BLOOD PRESSURE: 94 MMHG | RESPIRATION RATE: 18 BRPM

## 2022-02-26 DIAGNOSIS — R07.89 LEFT-SIDED CHEST WALL PAIN: Primary | ICD-10-CM

## 2022-02-26 LAB
ALBUMIN SERPL-MCNC: 4.4 G/DL (ref 3.5–5.2)
ALBUMIN/GLOB SERPL: 1.7 G/DL
ALP SERPL-CCNC: 88 U/L (ref 39–117)
ALT SERPL W P-5'-P-CCNC: 20 U/L (ref 1–33)
ANION GAP SERPL CALCULATED.3IONS-SCNC: 13.1 MMOL/L (ref 5–15)
AST SERPL-CCNC: 12 U/L (ref 1–32)
BASOPHILS # BLD AUTO: 0.06 10*3/MM3 (ref 0–0.2)
BASOPHILS NFR BLD AUTO: 0.4 % (ref 0–1.5)
BILIRUB SERPL-MCNC: 0.2 MG/DL (ref 0–1.2)
BUN SERPL-MCNC: 14 MG/DL (ref 6–20)
BUN/CREAT SERPL: 15.4 (ref 7–25)
CALCIUM SPEC-SCNC: 9.7 MG/DL (ref 8.6–10.5)
CHLORIDE SERPL-SCNC: 103 MMOL/L (ref 98–107)
CK MB SERPL-CCNC: 1.3 NG/ML
CK SERPL-CCNC: 91 U/L (ref 20–180)
CO2 SERPL-SCNC: 24.9 MMOL/L (ref 22–29)
CREAT SERPL-MCNC: 0.91 MG/DL (ref 0.57–1)
DEPRECATED RDW RBC AUTO: 43.7 FL (ref 37–54)
EOSINOPHIL # BLD AUTO: 0.19 10*3/MM3 (ref 0–0.4)
EOSINOPHIL NFR BLD AUTO: 1.4 % (ref 0.3–6.2)
ERYTHROCYTE [DISTWIDTH] IN BLOOD BY AUTOMATED COUNT: 12.9 % (ref 12.3–15.4)
GFR SERPL CREATININE-BSD FRML MDRD: 68 ML/MIN/1.73
GLOBULIN UR ELPH-MCNC: 2.6 GM/DL
GLUCOSE SERPL-MCNC: 84 MG/DL (ref 65–99)
HCT VFR BLD AUTO: 38.8 % (ref 34–46.6)
HGB BLD-MCNC: 13.1 G/DL (ref 12–15.9)
HOLD SPECIMEN: NORMAL
IMM GRANULOCYTES # BLD AUTO: 0.05 10*3/MM3 (ref 0–0.05)
IMM GRANULOCYTES NFR BLD AUTO: 0.4 % (ref 0–0.5)
LIPASE SERPL-CCNC: 19 U/L (ref 13–60)
LYMPHOCYTES # BLD AUTO: 4.47 10*3/MM3 (ref 0.7–3.1)
LYMPHOCYTES NFR BLD AUTO: 32 % (ref 19.6–45.3)
MAGNESIUM SERPL-MCNC: 1.7 MG/DL (ref 1.6–2.6)
MCH RBC QN AUTO: 31 PG (ref 26.6–33)
MCHC RBC AUTO-ENTMCNC: 33.8 G/DL (ref 31.5–35.7)
MCV RBC AUTO: 91.9 FL (ref 79–97)
MONOCYTES # BLD AUTO: 0.97 10*3/MM3 (ref 0.1–0.9)
MONOCYTES NFR BLD AUTO: 7 % (ref 5–12)
NEUTROPHILS NFR BLD AUTO: 58.8 % (ref 42.7–76)
NEUTROPHILS NFR BLD AUTO: 8.21 10*3/MM3 (ref 1.7–7)
NRBC BLD AUTO-RTO: 0 /100 WBC (ref 0–0.2)
NT-PROBNP SERPL-MCNC: 38.7 PG/ML (ref 0–450)
PLATELET # BLD AUTO: 391 10*3/MM3 (ref 140–450)
PMV BLD AUTO: 8.8 FL (ref 6–12)
POTASSIUM SERPL-SCNC: 4 MMOL/L (ref 3.5–5.2)
PROT SERPL-MCNC: 7 G/DL (ref 6–8.5)
RBC # BLD AUTO: 4.22 10*6/MM3 (ref 3.77–5.28)
SODIUM SERPL-SCNC: 141 MMOL/L (ref 136–145)
TROPONIN I SERPL-MCNC: 0 NG/ML (ref 0–0.6)
TROPONIN I SERPL-MCNC: 0 NG/ML (ref 0–0.6)
WBC NRBC COR # BLD: 13.95 10*3/MM3 (ref 3.4–10.8)
WHOLE BLOOD HOLD SPECIMEN: NORMAL
WHOLE BLOOD HOLD SPECIMEN: NORMAL

## 2022-02-26 PROCEDURE — 82553 CREATINE MB FRACTION: CPT | Performed by: EMERGENCY MEDICINE

## 2022-02-26 PROCEDURE — 25010000002 KETOROLAC TROMETHAMINE PER 15 MG: Performed by: EMERGENCY MEDICINE

## 2022-02-26 PROCEDURE — 96372 THER/PROPH/DIAG INJ SC/IM: CPT

## 2022-02-26 PROCEDURE — 71045 X-RAY EXAM CHEST 1 VIEW: CPT

## 2022-02-26 PROCEDURE — 93005 ELECTROCARDIOGRAM TRACING: CPT | Performed by: EMERGENCY MEDICINE

## 2022-02-26 PROCEDURE — 99283 EMERGENCY DEPT VISIT LOW MDM: CPT

## 2022-02-26 PROCEDURE — 80053 COMPREHEN METABOLIC PANEL: CPT | Performed by: EMERGENCY MEDICINE

## 2022-02-26 PROCEDURE — 93010 ELECTROCARDIOGRAM REPORT: CPT | Performed by: INTERNAL MEDICINE

## 2022-02-26 PROCEDURE — 83690 ASSAY OF LIPASE: CPT | Performed by: EMERGENCY MEDICINE

## 2022-02-26 PROCEDURE — 82550 ASSAY OF CK (CPK): CPT | Performed by: EMERGENCY MEDICINE

## 2022-02-26 PROCEDURE — 83735 ASSAY OF MAGNESIUM: CPT | Performed by: EMERGENCY MEDICINE

## 2022-02-26 PROCEDURE — 85025 COMPLETE CBC W/AUTO DIFF WBC: CPT

## 2022-02-26 PROCEDURE — 84484 ASSAY OF TROPONIN QUANT: CPT

## 2022-02-26 PROCEDURE — 93005 ELECTROCARDIOGRAM TRACING: CPT

## 2022-02-26 PROCEDURE — 36415 COLL VENOUS BLD VENIPUNCTURE: CPT

## 2022-02-26 PROCEDURE — 83880 ASSAY OF NATRIURETIC PEPTIDE: CPT | Performed by: EMERGENCY MEDICINE

## 2022-02-26 RX ORDER — ASPIRIN 81 MG/1
324 TABLET, CHEWABLE ORAL ONCE
Status: COMPLETED | OUTPATIENT
Start: 2022-02-26 | End: 2022-02-26

## 2022-02-26 RX ORDER — IBUPROFEN 800 MG/1
800 TABLET ORAL EVERY 8 HOURS PRN
Qty: 20 TABLET | Refills: 0 | Status: SHIPPED | OUTPATIENT
Start: 2022-02-26

## 2022-02-26 RX ORDER — CYCLOBENZAPRINE HCL 10 MG
10 TABLET ORAL 3 TIMES DAILY PRN
Qty: 12 TABLET | Refills: 0 | Status: SHIPPED | OUTPATIENT
Start: 2022-02-26 | End: 2022-05-04 | Stop reason: SDUPTHER

## 2022-02-26 RX ORDER — HYDROCODONE BITARTRATE AND ACETAMINOPHEN 5; 325 MG/1; MG/1
1 TABLET ORAL EVERY 6 HOURS PRN
Qty: 12 TABLET | Refills: 0 | Status: SHIPPED | OUTPATIENT
Start: 2022-02-26 | End: 2022-05-04

## 2022-02-26 RX ORDER — SODIUM CHLORIDE 0.9 % (FLUSH) 0.9 %
10 SYRINGE (ML) INJECTION AS NEEDED
Status: DISCONTINUED | OUTPATIENT
Start: 2022-02-26 | End: 2022-02-26 | Stop reason: HOSPADM

## 2022-02-26 RX ORDER — KETOROLAC TROMETHAMINE 30 MG/ML
30 INJECTION, SOLUTION INTRAMUSCULAR; INTRAVENOUS ONCE
Status: COMPLETED | OUTPATIENT
Start: 2022-02-26 | End: 2022-02-26

## 2022-02-26 RX ADMIN — ASPIRIN 81 MG CHEWABLE TABLET 324 MG: 81 TABLET CHEWABLE at 18:24

## 2022-02-26 RX ADMIN — KETOROLAC TROMETHAMINE 30 MG: 30 INJECTION, SOLUTION INTRAMUSCULAR; INTRAVENOUS at 19:02

## 2022-02-27 LAB
QT INTERVAL: 365 MS
QT INTERVAL: 388 MS

## 2022-05-04 ENCOUNTER — OFFICE VISIT (OUTPATIENT)
Dept: FAMILY MEDICINE CLINIC | Age: 42
End: 2022-05-04

## 2022-05-04 VITALS
BODY MASS INDEX: 39.74 KG/M2 | DIASTOLIC BLOOD PRESSURE: 85 MMHG | SYSTOLIC BLOOD PRESSURE: 127 MMHG | HEART RATE: 85 BPM | OXYGEN SATURATION: 96 % | HEIGHT: 67 IN | WEIGHT: 253.2 LBS

## 2022-05-04 DIAGNOSIS — E03.9 HYPOTHYROIDISM, UNSPECIFIED TYPE: Chronic | ICD-10-CM

## 2022-05-04 DIAGNOSIS — F41.8 DEPRESSION WITH ANXIETY: Chronic | ICD-10-CM

## 2022-05-04 DIAGNOSIS — F43.21 ADJUSTMENT DISORDER WITH DEPRESSED MOOD: Chronic | ICD-10-CM

## 2022-05-04 DIAGNOSIS — E28.2 PCOS (POLYCYSTIC OVARIAN SYNDROME): Chronic | ICD-10-CM

## 2022-05-04 PROCEDURE — 99214 OFFICE O/P EST MOD 30 MIN: CPT | Performed by: NURSE PRACTITIONER

## 2022-05-04 RX ORDER — BUSPIRONE HYDROCHLORIDE 15 MG/1
15 TABLET ORAL 2 TIMES DAILY
Qty: 180 TABLET | Refills: 1 | Status: SHIPPED | OUTPATIENT
Start: 2022-05-04 | End: 2022-11-07

## 2022-05-04 RX ORDER — LAMOTRIGINE 100 MG/1
100 TABLET ORAL 2 TIMES DAILY
Qty: 180 TABLET | Refills: 1 | Status: SHIPPED | OUTPATIENT
Start: 2022-05-04 | End: 2022-11-07

## 2022-05-04 RX ORDER — LEVOTHYROXINE SODIUM 0.12 MG/1
125 TABLET ORAL DAILY
Qty: 90 TABLET | Refills: 1 | Status: SHIPPED | OUTPATIENT
Start: 2022-05-04 | End: 2022-11-07

## 2022-05-04 RX ORDER — CYCLOBENZAPRINE HCL 10 MG
10 TABLET ORAL 3 TIMES DAILY PRN
Qty: 12 TABLET | Refills: 0 | Status: SHIPPED | OUTPATIENT
Start: 2022-05-04 | End: 2022-07-25

## 2022-05-04 RX ORDER — BREXPIPRAZOLE 2 MG/1
2 TABLET ORAL
COMMUNITY
Start: 2022-04-05 | End: 2022-05-04 | Stop reason: SDUPTHER

## 2022-05-04 RX ORDER — BUPROPION HYDROCHLORIDE 300 MG/1
300 TABLET ORAL EVERY MORNING
Qty: 90 TABLET | Refills: 1 | Status: SHIPPED | OUTPATIENT
Start: 2022-05-04 | End: 2022-11-07

## 2022-05-04 RX ORDER — METFORMIN HYDROCHLORIDE 500 MG/1
500 TABLET, EXTENDED RELEASE ORAL 2 TIMES DAILY
Qty: 180 TABLET | Refills: 1 | Status: SHIPPED | OUTPATIENT
Start: 2022-05-04 | End: 2022-11-07

## 2022-05-04 RX ORDER — ACETAMINOPHEN AND CODEINE PHOSPHATE 120; 12 MG/5ML; MG/5ML
1 SOLUTION ORAL DAILY
Qty: 84 TABLET | Refills: 4 | Status: SHIPPED | OUTPATIENT
Start: 2022-05-04 | End: 2022-11-15 | Stop reason: SDUPTHER

## 2022-05-04 RX ORDER — BREXPIPRAZOLE 2 MG/1
2 TABLET ORAL
Qty: 90 TABLET | Refills: 1 | Status: SHIPPED | OUTPATIENT
Start: 2022-05-04 | End: 2022-07-25 | Stop reason: ALTCHOICE

## 2022-05-04 NOTE — PROGRESS NOTES
Assessment and Plan    Diagnoses and all orders for this visit:    1. Depression with anxiety  Comments:  continue current treatment plan follow-up with Mohini as needed, continue psychotherapy as recommended  Orders:  -     buPROPion XL (WELLBUTRIN XL) 300 MG 24 hr tablet; Take 1 tablet by mouth Every Morning.  Dispense: 90 tablet; Refill: 1  -     busPIRone (BUSPAR) 15 MG tablet; Take 1 tablet by mouth 2 (Two) Times a Day.  Dispense: 180 tablet; Refill: 1  -     lamoTRIgine (LaMICtal) 100 MG tablet; Take 1 tablet by mouth 2 (Two) Times a Day. Needs appt  before next refill  Dispense: 180 tablet; Refill: 1    2. Adjustment disorder with depressed mood  Comments:  Continue current treatment.  She will continue counseling - should she feel like medication no longer working , will need referral back to [facility]  Orders:  -     lamoTRIgine (LaMICtal) 100 MG tablet; Take 1 tablet by mouth 2 (Two) Times a Day. Needs appt  before next refill  Dispense: 180 tablet; Refill: 1    3. Hypothyroidism, unspecified type  Comments:  continue current treatment   Orders:  -     levothyroxine (SYNTHROID, LEVOTHROID) 125 MCG tablet; Take 1 tablet by mouth Daily.  Dispense: 90 tablet; Refill: 1    4. PCOS (polycystic ovarian syndrome)  Comments:  continue current treatment   Orders:  -     metFORMIN ER (GLUCOPHAGE-XR) 500 MG 24 hr tablet; Take 1 tablet by mouth 2 (Two) Times a Day.  Dispense: 180 tablet; Refill: 1  -     norethindrone (MICRONOR) 0.35 MG tablet; Take 1 tablet by mouth Daily.  Dispense: 84 tablet; Refill: 4    Other orders  -     cyclobenzaprine (FLEXERIL) 10 MG tablet; Take 1 tablet by mouth 3 (Three) Times a Day As Needed for Muscle Spasms.  Dispense: 12 tablet; Refill: 0  -     Rexulti 2 MG tablet; Take 1 tablet by mouth every night at bedtime.  Dispense: 90 tablet; Refill: 1        Follow Up   Return in about 6 months (around 11/4/2022) for Annual physical, Recheck.    Chief Complaint  Aurora Mendez presents to [facility]  [facility] [place] [facility] for Depression, Hyperlipidemia, Hypothyroidism, and Hypertension    Subjective     {Problem List  Visit Diagnosis   Encounters  Notes  Medications  Labs  Result Review Imaging  Media :23}     Aurora is here for follow up on depression.  She is reporting that Her medication is working well without side effects.  Current treatment includes Wellbutrin 300 , buspirone 15mg BID, lamictal 100 BID, Rexulti 2mg nightly.  She did recently have to return to [place] [facility] for medication management - which is when she was put on Rexulti and reports that this is working well for her.  She continues to speak with the therapist  - however, she did have to go back to 2 times per month given that her symptoms had began to worsen and reports that she is stable at this time.     Aurora presents today for follow up on hyperlipidemia.  Previous values: Lab Results       Component                Value               Date                       CHOL                     181                 12/01/2021                 CHLPL                    163                 10/28/2020                 TRIG                     175 (H)             12/01/2021                 HDL                      42                  12/01/2021                 LDL                      108 (H)             12/01/2021           ;    Current CVD 10yr risk is The 10-year ASCVD risk score ([place] DC Jr., et al., 2013) is: 3.3%    Values used to calculate the score:      Age: 41 years      Sex: Female      Is Non- : No      Diabetic: No      Tobacco smoker: Yes      Systolic Blood Pressure: 127 mmHg      Is BP treated: No      HDL Cholesterol: 42 mg/dL      Total Cholesterol: 181 mg/dL ;    Aurora reports compliant with medication which is atorvastatin.    No side effects reported from this medication .   No new concerns to discuss today.    Aurora presents for follow up on hypothyroidism. Denies symptoms of  "thyroid dysfunction at this time.  Reports compliance with medication including :levothyroxine 125mg        Last Lab Results       Component                Value               Date                       TSH                      1.460               12/01/2021                Aurora presents for follow up on hypertension.  Compliance with medication is reported as good   Check of BP at home reported as well controlled.  No new concerns or problems to report.        Review of Systems    Objective     Vitals:    05/04/22 1543   BP: 127/85   Pulse: 85   SpO2: 96%   Weight: 115 kg (253 lb 3.2 oz)   Height: 170.2 cm (67.01\")     Body mass index is 39.65 kg/m².     Physical Exam  Constitutional:       General: She is not in acute distress.     Appearance: Normal appearance.   HENT:      Head: Normocephalic.   Cardiovascular:      Rate and Rhythm: Normal rate and regular rhythm.   Pulmonary:      Effort: Pulmonary effort is normal.      Breath sounds: Normal breath sounds.   Musculoskeletal:         General: Normal range of motion.   Neurological:      General: No focal deficit present.      Mental Status: She is alert and oriented to person, place, and time.   Psychiatric:         Mood and Affect: Mood normal.         Behavior: Behavior normal.         Result Review :                    No Known Allergies   Past Medical History:   Diagnosis Date   • Adjustment disorder with depressed mood    • Anogenital (venereal) warts    • Candidiasis of skin and nail    • Chest pain, unspecified    • Contact with and (suspected) exposure to covid-19    • Contact with and (suspected) exposure to other viral communicable diseases    • Dysthymic disorder    • Furuncle of other sites    • Generalized anxiety disorder    • Hypothyroidism, unspecified    • Major depressive disorder, recurrent severe without psychotic features (HCC)    • Mixed hyperlipidemia    • Neoplasm of uncertain behavior of skin    • Nicotine dependence, unspecified, " uncomplicated    • Pain in left knee    • Pain in right knee    • Polycystic ovarian syndrome    • Psychophysiologic insomnia    • Pure hyperglyceridemia    • Strain of unspecified muscle, fascia and tendon at shoulder and upper arm level, left arm, initial encounter    • Vitamin D deficiency, unspecified      Current Outpatient Medications   Medication Sig Dispense Refill   • atorvastatin (LIPITOR) 40 MG tablet Take 1 tablet by mouth Every Night. 90 tablet 1   • buPROPion XL (WELLBUTRIN XL) 300 MG 24 hr tablet Take 1 tablet by mouth Every Morning. 90 tablet 1   • busPIRone (BUSPAR) 15 MG tablet Take 1 tablet by mouth 2 (Two) Times a Day. 180 tablet 1   • cholecalciferol (VITAMIN D3) 25 MCG (1000 UT) tablet Take 1,000 Units by mouth Daily.     • cyclobenzaprine (FLEXERIL) 10 MG tablet Take 1 tablet by mouth 3 (Three) Times a Day As Needed for Muscle Spasms. 12 tablet 0   • ibuprofen (ADVIL,MOTRIN) 800 MG tablet Take 1 tablet by mouth Every 8 (Eight) Hours As Needed for Moderate Pain . 20 tablet 0   • lamoTRIgine (LaMICtal) 100 MG tablet Take 1 tablet by mouth 2 (Two) Times a Day. Needs appt  before next refill 180 tablet 1   • levothyroxine (SYNTHROID, LEVOTHROID) 125 MCG tablet Take 1 tablet by mouth Daily. 90 tablet 1   • metFORMIN ER (GLUCOPHAGE-XR) 500 MG 24 hr tablet Take 1 tablet by mouth 2 (Two) Times a Day. 180 tablet 1   • norethindrone (MICRONOR) 0.35 MG tablet Take 1 tablet by mouth Daily. 84 tablet 4   • Rexulti 2 MG tablet Take 1 tablet by mouth every night at bedtime. 90 tablet 1     No current facility-administered medications for this visit.     Past Surgical History:   Procedure Laterality Date   • HYSTERECTOMY      Hysterectomy: stage III dysplasia;       Social History     Tobacco Use   • Smoking status: Current Every Day Smoker     Packs/day: 0.50     Years: 27.00     Pack years: 13.50     Types: Cigarettes     Start date: 1994   • Smokeless tobacco: Never Used   Vaping Use   • Vaping Use: Never  used   Substance Use Topics   • Alcohol use: Not Currently   • Drug use: Never     Comment: NEGATIVE      Family History   Problem Relation Age of Onset   • Other Mother          Neuroendocrine tumor - cause of death    • Pancreatic cancer Father         cause of death    • Heart attack Maternal Grandfather    • Coronary artery disease Paternal Grandmother    • Diabetes type II Paternal Grandmother    • Pneumonia Daughter      Health Maintenance Due   Topic Date Due   • ANNUAL PHYSICAL  Never done   • TDAP/TD VACCINES (1 - Tdap) Never done   • HEPATITIS C SCREENING  Never done   • COVID-19 Vaccine (3 - Booster for Pfizer series) 05/07/2022      Immunization History   Administered Date(s) Administered   • COVID-19 (PFIZER) PURPLE CAP 10/08/2021   • Covid-19 (Pfizer) Gray Cap 12/07/2021   • Flu Vaccine Split Quad 10/30/2017, 10/25/2021   • Influenza, Unspecified 10/30/2017   • PPD Test 01/30/2007

## 2022-07-25 ENCOUNTER — OFFICE VISIT (OUTPATIENT)
Dept: FAMILY MEDICINE CLINIC | Age: 42
End: 2022-07-25

## 2022-07-25 ENCOUNTER — DOCUMENTATION (OUTPATIENT)
Dept: FAMILY MEDICINE CLINIC | Age: 42
End: 2022-07-25

## 2022-07-25 VITALS
WEIGHT: 244 LBS | SYSTOLIC BLOOD PRESSURE: 120 MMHG | DIASTOLIC BLOOD PRESSURE: 70 MMHG | HEART RATE: 110 BPM | OXYGEN SATURATION: 98 % | BODY MASS INDEX: 38.3 KG/M2 | HEIGHT: 67 IN | TEMPERATURE: 98.6 F

## 2022-07-25 DIAGNOSIS — R68.83 CHILLS: ICD-10-CM

## 2022-07-25 DIAGNOSIS — U07.1 COVID-19: Primary | ICD-10-CM

## 2022-07-25 DIAGNOSIS — R05.9 COUGH: ICD-10-CM

## 2022-07-25 DIAGNOSIS — R52 BODY ACHES: ICD-10-CM

## 2022-07-25 DIAGNOSIS — R09.81 NASAL CONGESTION: ICD-10-CM

## 2022-07-25 LAB
EXPIRATION DATE: ABNORMAL
FLUAV AG UPPER RESP QL IA.RAPID: NOT DETECTED
FLUBV AG UPPER RESP QL IA.RAPID: NOT DETECTED
INTERNAL CONTROL: ABNORMAL
Lab: ABNORMAL
SARS-COV-2 AG UPPER RESP QL IA.RAPID: DETECTED

## 2022-07-25 PROCEDURE — 87428 SARSCOV & INF VIR A&B AG IA: CPT | Performed by: NURSE PRACTITIONER

## 2022-07-25 PROCEDURE — 99213 OFFICE O/P EST LOW 20 MIN: CPT | Performed by: NURSE PRACTITIONER

## 2022-07-25 RX ORDER — ALBUTEROL SULFATE 90 UG/1
2 AEROSOL, METERED RESPIRATORY (INHALATION) EVERY 4 HOURS PRN
Qty: 18 G | Refills: 0 | Status: SHIPPED | OUTPATIENT
Start: 2022-07-25 | End: 2022-12-22

## 2022-07-25 NOTE — PROGRESS NOTES
Assessment and Plan    Diagnoses and all orders for this visit:    1. COVID-19 (Primary)  Comments:  paxlovid contraindicated with other medications she is not willing / able to stop.  symptomatic treatment  follow up if new or worsening  Orders:  -     albuterol sulfate  (90 Base) MCG/ACT inhaler; Inhale 2 puffs Every 4 (Four) Hours As Needed for Wheezing.  Dispense: 18 g; Refill: 0    2. Cough  Comments:  mucinex/ robitussin  OTC   Orders:  -     POCT SARS-CoV-2 Antigen ADINA + Flu    3. Chills  -     POCT SARS-CoV-2 Antigen ADINA + Flu    4. Nasal congestion  Comments:  mucinex/ saline nasal spray  Orders:  -     POCT SARS-CoV-2 Antigen ADINA + Flu    5. Body aches  Comments:  ibuprofen / tylenol  PRN  Orders:  -     POCT SARS-CoV-2 Antigen ADINA + Flu        Follow Up   Return if symptoms worsen or fail to improve.    Chief Complaint  Aurora Mendez presents to [facility] for Sore Throat, Nasal Congestion (Patient states this has been going on for 2 days states she is feeling worse. 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   n/a  Date of symptoms start?   Saturday  (Symptoms may appear 2-14 days after exposure )    Fever or chills?   yes  Cough?   yes  Shortness of breath or difficulty breathing?  no  Fatigue?   yes  Muscle or body aches?  yes   Headache?   yes  New loss of taste or smell? no  Sore throat?  yes  Congestion or runny nose? yes  Nausea or vomiting?   no  Diarrhea?   yes  Any  emergency warning signs for COVID-19.   Trouble breathing?  no  Persistent pain or pressure in the chest?   no  New confusion? no  Inability to wake or stay awake?no  Pale, gray, or blue-colored skin, lips, or nail beds, depending on skin tone?   yes    Taking  "any medications at home to help with symptoms?yes  nyquil and tylenol  Any prior vaccine to covid?   Yes  But no booster   Any significant health problems / existing lung / heart problems?  yes              Review of Systems    Objective     Vitals:    07/25/22 1339   BP: 120/70   BP Location: Left arm   Patient Position: Sitting   Cuff Size: Large Adult   Pulse: 120   Temp: 98.6 °F (37 °C)   TempSrc: Oral   SpO2: 98%   Weight: 111 kg (244 lb)   Height: 170.2 cm (67\")     Body mass index is 38.22 kg/m².     Physical Exam  Constitutional:       General: She is not in acute distress.     Appearance: Normal appearance. She is ill-appearing.   HENT:      Head: Normocephalic.   Cardiovascular:      Rate and Rhythm: Regular rhythm. Tachycardia present.   Pulmonary:      Effort: Pulmonary effort is normal.      Breath sounds: Normal breath sounds.   Musculoskeletal:         General: Normal range of motion.   Neurological:      General: No focal deficit present.      Mental Status: She is alert and oriented to person, place, and time.   Psychiatric:         Mood and Affect: Mood normal.         Behavior: Behavior normal.         Result Review :                    No Known Allergies   Past Medical History:   Diagnosis Date   • Adjustment disorder with depressed mood    • Anogenital (venereal) warts    • Candidiasis of skin and nail    • Chest pain, unspecified    • Contact with and (suspected) exposure to covid-19    • Contact with and (suspected) exposure to other viral communicable diseases    • Dysthymic disorder    • Furuncle of other sites    • Generalized anxiety disorder    • Hypothyroidism, unspecified    • Major depressive disorder, recurrent severe without psychotic features (HCC)    • Mixed hyperlipidemia    • Neoplasm of uncertain behavior of skin    • Nicotine dependence, unspecified, uncomplicated    • Pain in left knee    • Pain in right knee    • Polycystic ovarian syndrome    • Psychophysiologic insomnia  "   • Pure hyperglyceridemia    • Strain of unspecified muscle, fascia and tendon at shoulder and upper arm level, left arm, initial encounter    • Vitamin D deficiency, unspecified      Current Outpatient Medications   Medication Sig Dispense Refill   • atorvastatin (LIPITOR) 40 MG tablet Take 1 tablet by mouth Every Night. 90 tablet 1   • buPROPion XL (WELLBUTRIN XL) 300 MG 24 hr tablet Take 1 tablet by mouth Every Morning. 90 tablet 1   • busPIRone (BUSPAR) 15 MG tablet Take 1 tablet by mouth 2 (Two) Times a Day. 180 tablet 1   • cholecalciferol (VITAMIN D3) 25 MCG (1000 UT) tablet Take 1,000 Units by mouth Daily.     • ibuprofen (ADVIL,MOTRIN) 800 MG tablet Take 1 tablet by mouth Every 8 (Eight) Hours As Needed for Moderate Pain . 20 tablet 0   • lamoTRIgine (LaMICtal) 100 MG tablet Take 1 tablet by mouth 2 (Two) Times a Day. Needs appt  before next refill 180 tablet 1   • levothyroxine (SYNTHROID, LEVOTHROID) 125 MCG tablet Take 1 tablet by mouth Daily. 90 tablet 1   • metFORMIN ER (GLUCOPHAGE-XR) 500 MG 24 hr tablet Take 1 tablet by mouth 2 (Two) Times a Day. 180 tablet 1   • norethindrone (MICRONOR) 0.35 MG tablet Take 1 tablet by mouth Daily. 84 tablet 4   • albuterol sulfate  (90 Base) MCG/ACT inhaler Inhale 2 puffs Every 4 (Four) Hours As Needed for Wheezing. 18 g 0   • cyclobenzaprine (FLEXERIL) 10 MG tablet Take 1 tablet by mouth 3 (Three) Times a Day As Needed for Muscle Spasms. 12 tablet 0   • Rexulti 2 MG tablet Take 1 tablet by mouth every night at bedtime. 90 tablet 1     No current facility-administered medications for this visit.     Past Surgical History:   Procedure Laterality Date   • HYSTERECTOMY      Hysterectomy: stage III dysplasia;       Social History     Tobacco Use   • Smoking status: Current Every Day Smoker     Packs/day: 0.50     Years: 27.00     Pack years: 13.50     Types: Cigarettes     Start date: 1994   • Smokeless tobacco: Never Used   Vaping Use   • Vaping Use: Never used    Substance Use Topics   • Alcohol use: Not Currently   • Drug use: Never     Comment: NEGATIVE      Family History   Problem Relation Age of Onset   • Other Mother          Neuroendocrine tumor - cause of death    • Pancreatic cancer Father         cause of death    • Heart attack Maternal Grandfather    • Coronary artery disease Paternal Grandmother    • Diabetes type II Paternal Grandmother    • Pneumonia Daughter      Health Maintenance Due   Topic Date Due   • ANNUAL PHYSICAL  Never done   • TDAP/TD VACCINES (1 - Tdap) Never done   • HEPATITIS C SCREENING  Never done   • COVID-19 Vaccine (3 - Booster for Pfizer series) 05/07/2022      Immunization History   Administered Date(s) Administered   • COVID-19 (PFIZER) PURPLE CAP 10/08/2021   • Covid-19 (Pfizer) Gray Cap 12/07/2021   • Flu Vaccine Split Quad 10/30/2017, 10/25/2021   • Influenza, Unspecified 10/30/2017   • PPD Test 01/30/2007

## 2022-08-01 ENCOUNTER — TELEPHONE (OUTPATIENT)
Dept: FAMILY MEDICINE CLINIC | Age: 42
End: 2022-08-01

## 2022-08-01 NOTE — TELEPHONE ENCOUNTER
Caller: Aurora Mendez    Relationship: Self    Best call back number: [phone]     What form or medical record are you requesting: WORK EXCUSE     Who is requesting this form or medical record from you: WORK     How would you like to receive the form or medical records (pick-up, mail, fax):     [address] [facility] [id number]    Timeframe paperwork needed: ASAP     Additional notes:     PATIENT WOULD LIKE TO EXTEND WORK EXCUSE FOR WORK    PATIENT STILL HAS ONGOING SYMPTOMS OF COVID 19     PLEASE ADVISE       PLEASE CALL PATIENT WHEN PAPERWORK HAS BEEN MAILED

## 2022-08-04 DIAGNOSIS — E78.5 HYPERLIPIDEMIA, UNSPECIFIED HYPERLIPIDEMIA TYPE: Chronic | ICD-10-CM

## 2022-08-04 RX ORDER — ATORVASTATIN CALCIUM 40 MG/1
40 TABLET, FILM COATED ORAL NIGHTLY
Qty: 90 TABLET | Refills: 0 | Status: SHIPPED | OUTPATIENT
Start: 2022-08-04 | End: 2022-11-07

## 2022-08-05 ENCOUNTER — TELEPHONE (OUTPATIENT)
Dept: FAMILY MEDICINE CLINIC | Age: 42
End: 2022-08-05

## 2022-08-05 NOTE — TELEPHONE ENCOUNTER
Caller: Aurora Mendez    Relationship: Self    Best call back number: [phone]     What was the call regarding: PT WANTED CHRISTINA TO KNOW SHE WILL BE DROPPING HER FMLA PAPERWORK OFF THIS UPCOMING Monday AT THE OFFICE

## 2022-08-08 NOTE — TELEPHONE ENCOUNTER
Pt dropped off FMLA forms. Pt paid $20 fee. Forms have been atteched to this encounter. Forms have been placed in [facility] box.

## 2022-08-09 NOTE — TELEPHONE ENCOUNTER
lmtrc    I need a fax number or pt can . Advised her to call back and let us know what she prefers.

## 2022-09-22 ENCOUNTER — TELEPHONE (OUTPATIENT)
Dept: FAMILY MEDICINE CLINIC | Age: 42
End: 2022-09-22

## 2022-09-22 NOTE — TELEPHONE ENCOUNTER
I called Digna from [employer] and had to leave a message. I let her know that [facility] had seen the requested documents to her on 9/21/22 and to let us know if she did not receive them or needed something else from us.

## 2022-09-22 NOTE — TELEPHONE ENCOUNTER
Caller: [employer]    Relationship: Other    Best call back number: [phone]    What form or medical record are you requesting: MEDICAL RECORDS    Who is requesting this form or medical record from you: [employer]    Additional notes: MARLENY MOORE CALLED TO FIND OUT IF OFFICE RECEIVED MEDICAL RECORDS REQUEST FOR PATIENT THAT WAS FAXED ON 09/19/2022.

## 2022-09-27 ENCOUNTER — TELEPHONE (OUTPATIENT)
Dept: FAMILY MEDICINE CLINIC | Age: 42
End: 2022-09-27

## 2022-09-27 NOTE — TELEPHONE ENCOUNTER
Caller: Aurora Mendez    Relationship to patient: Self    Best call back number: [phone]    Patient is needing: PATIENT WOULD LIKE  A CALL BACK REQUESTING REGARDING SHORT TERM DISABILITY PAPERWORK    PLEASE ADVISE

## 2022-10-25 ENCOUNTER — TELEPHONE (OUTPATIENT)
Dept: FAMILY MEDICINE CLINIC | Age: 42
End: 2022-10-25

## 2022-10-25 NOTE — TELEPHONE ENCOUNTER
LVM WITH PATIENT REGARDING MISSED APPT ON 10/25/22 WITH MM. LETTER HAS BEEN MAILED REGARDING 1ST NO SHOW.

## 2022-11-04 ENCOUNTER — TELEPHONE (OUTPATIENT)
Dept: FAMILY MEDICINE CLINIC | Age: 42
End: 2022-11-04

## 2022-11-04 DIAGNOSIS — F43.21 ADJUSTMENT DISORDER WITH DEPRESSED MOOD: Chronic | ICD-10-CM

## 2022-11-04 DIAGNOSIS — E28.2 PCOS (POLYCYSTIC OVARIAN SYNDROME): Chronic | ICD-10-CM

## 2022-11-04 DIAGNOSIS — E03.9 HYPOTHYROIDISM, UNSPECIFIED TYPE: Chronic | ICD-10-CM

## 2022-11-04 DIAGNOSIS — E78.5 HYPERLIPIDEMIA, UNSPECIFIED HYPERLIPIDEMIA TYPE: Chronic | ICD-10-CM

## 2022-11-04 DIAGNOSIS — F41.8 DEPRESSION WITH ANXIETY: Chronic | ICD-10-CM

## 2022-11-04 RX ORDER — LAMOTRIGINE 100 MG/1
100 TABLET ORAL 2 TIMES DAILY
Qty: 180 TABLET | Refills: 1 | Status: CANCELLED | OUTPATIENT
Start: 2022-11-04

## 2022-11-04 RX ORDER — ACETAMINOPHEN AND CODEINE PHOSPHATE 120; 12 MG/5ML; MG/5ML
1 SOLUTION ORAL DAILY
Qty: 84 TABLET | Refills: 4 | Status: CANCELLED | OUTPATIENT
Start: 2022-11-04

## 2022-11-04 RX ORDER — METFORMIN HYDROCHLORIDE 500 MG/1
500 TABLET, EXTENDED RELEASE ORAL 2 TIMES DAILY
Qty: 180 TABLET | Refills: 1 | Status: CANCELLED | OUTPATIENT
Start: 2022-11-04

## 2022-11-04 RX ORDER — BUPROPION HYDROCHLORIDE 300 MG/1
300 TABLET ORAL EVERY MORNING
Qty: 90 TABLET | Refills: 1 | Status: CANCELLED | OUTPATIENT
Start: 2022-11-04

## 2022-11-04 RX ORDER — BUSPIRONE HYDROCHLORIDE 15 MG/1
15 TABLET ORAL 2 TIMES DAILY
Qty: 180 TABLET | Refills: 1 | Status: CANCELLED | OUTPATIENT
Start: 2022-11-04

## 2022-11-04 RX ORDER — LEVOTHYROXINE SODIUM 0.12 MG/1
125 TABLET ORAL DAILY
Qty: 90 TABLET | Refills: 1 | Status: CANCELLED | OUTPATIENT
Start: 2022-11-04

## 2022-11-04 RX ORDER — ATORVASTATIN CALCIUM 40 MG/1
40 TABLET, FILM COATED ORAL NIGHTLY
Qty: 90 TABLET | Refills: 0 | Status: CANCELLED | OUTPATIENT
Start: 2022-11-04

## 2022-11-04 NOTE — TELEPHONE ENCOUNTER
Caller: Aurora Mendez    Relationship: Self    Best call back number: [phone]    Requested Prescriptions:   Requested Prescriptions     Pending Prescriptions Disp Refills   • levothyroxine (SYNTHROID, LEVOTHROID) 125 MCG tablet 90 tablet 1     Sig: Take 1 tablet by mouth Daily.   • atorvastatin (LIPITOR) 40 MG tablet 90 tablet 0     Sig: Take 1 tablet by mouth Every Night.   • lamoTRIgine (LaMICtal) 100 MG tablet 180 tablet 1     Sig: Take 1 tablet by mouth 2 (Two) Times a Day. Needs appt  before next refill   • busPIRone (BUSPAR) 15 MG tablet 180 tablet 1     Sig: Take 1 tablet by mouth 2 (Two) Times a Day.   • metFORMIN ER (GLUCOPHAGE-XR) 500 MG 24 hr tablet 180 tablet 1     Sig: Take 1 tablet by mouth 2 (Two) Times a Day.   • buPROPion XL (WELLBUTRIN XL) 300 MG 24 hr tablet 90 tablet 1     Sig: Take 1 tablet by mouth Every Morning.   • norethindrone (MICRONOR) 0.35 MG tablet 84 tablet 4     Sig: Take 1 tablet by mouth Daily.        Pharmacy where request should be sent: [facility] [place], [place] - [address] [phone] [facility] [phone] FX         Does the patient have less than a 3 day supply:  [x] Yes  [] No    Roberta Sanderson   11/04/22 11:47 EDT

## 2022-11-07 RX ORDER — LAMOTRIGINE 100 MG/1
TABLET ORAL
Qty: 60 TABLET | Refills: 0 | Status: SHIPPED | OUTPATIENT
Start: 2022-11-07 | End: 2022-11-15 | Stop reason: SDUPTHER

## 2022-11-07 RX ORDER — BUSPIRONE HYDROCHLORIDE 15 MG/1
TABLET ORAL
Qty: 60 TABLET | Refills: 0 | Status: SHIPPED | OUTPATIENT
Start: 2022-11-07 | End: 2022-11-15 | Stop reason: SDUPTHER

## 2022-11-07 RX ORDER — LEVOTHYROXINE SODIUM 0.12 MG/1
TABLET ORAL
Qty: 30 TABLET | Refills: 2 | Status: SHIPPED | OUTPATIENT
Start: 2022-11-07 | End: 2022-11-15 | Stop reason: SDUPTHER

## 2022-11-07 RX ORDER — METFORMIN HYDROCHLORIDE 500 MG/1
TABLET, EXTENDED RELEASE ORAL
Qty: 60 TABLET | Refills: 2 | Status: SHIPPED | OUTPATIENT
Start: 2022-11-07 | End: 2022-11-15 | Stop reason: SDUPTHER

## 2022-11-07 RX ORDER — ATORVASTATIN CALCIUM 40 MG/1
40 TABLET, FILM COATED ORAL NIGHTLY
Qty: 90 TABLET | Refills: 3 | Status: SHIPPED | OUTPATIENT
Start: 2022-11-07 | End: 2022-11-15 | Stop reason: SDUPTHER

## 2022-11-07 RX ORDER — BUPROPION HYDROCHLORIDE 300 MG/1
300 TABLET ORAL EVERY MORNING
Qty: 30 TABLET | Refills: 0 | Status: SHIPPED | OUTPATIENT
Start: 2022-11-07 | End: 2022-11-15 | Stop reason: SDUPTHER

## 2022-11-15 ENCOUNTER — OFFICE VISIT (OUTPATIENT)
Dept: FAMILY MEDICINE CLINIC | Age: 42
End: 2022-11-15

## 2022-11-15 VITALS
DIASTOLIC BLOOD PRESSURE: 90 MMHG | TEMPERATURE: 98.4 F | OXYGEN SATURATION: 96 % | BODY MASS INDEX: 40.02 KG/M2 | HEART RATE: 88 BPM | SYSTOLIC BLOOD PRESSURE: 141 MMHG | HEIGHT: 67 IN | WEIGHT: 255 LBS

## 2022-11-15 DIAGNOSIS — F43.21 ADJUSTMENT DISORDER WITH DEPRESSED MOOD: Chronic | ICD-10-CM

## 2022-11-15 DIAGNOSIS — Z12.31 ENCOUNTER FOR SCREENING MAMMOGRAM FOR MALIGNANT NEOPLASM OF BREAST: ICD-10-CM

## 2022-11-15 DIAGNOSIS — E78.5 HYPERLIPIDEMIA, UNSPECIFIED HYPERLIPIDEMIA TYPE: Chronic | ICD-10-CM

## 2022-11-15 DIAGNOSIS — E28.2 PCOS (POLYCYSTIC OVARIAN SYNDROME): Chronic | ICD-10-CM

## 2022-11-15 DIAGNOSIS — F41.8 DEPRESSION WITH ANXIETY: Chronic | ICD-10-CM

## 2022-11-15 DIAGNOSIS — Z23 ENCOUNTER FOR IMMUNIZATION: ICD-10-CM

## 2022-11-15 DIAGNOSIS — E03.9 HYPOTHYROIDISM, UNSPECIFIED TYPE: Chronic | ICD-10-CM

## 2022-11-15 DIAGNOSIS — Z00.00 ROUTINE GENERAL MEDICAL EXAMINATION AT A HEALTH CARE FACILITY: Primary | ICD-10-CM

## 2022-11-15 DIAGNOSIS — E66.01 CLASS 2 SEVERE OBESITY DUE TO EXCESS CALORIES WITH SERIOUS COMORBIDITY AND BODY MASS INDEX (BMI) OF 39.0 TO 39.9 IN ADULT: ICD-10-CM

## 2022-11-15 PROCEDURE — 90471 IMMUNIZATION ADMIN: CPT | Performed by: NURSE PRACTITIONER

## 2022-11-15 PROCEDURE — 99213 OFFICE O/P EST LOW 20 MIN: CPT | Performed by: NURSE PRACTITIONER

## 2022-11-15 PROCEDURE — 99396 PREV VISIT EST AGE 40-64: CPT | Performed by: NURSE PRACTITIONER

## 2022-11-15 PROCEDURE — 90686 IIV4 VACC NO PRSV 0.5 ML IM: CPT | Performed by: NURSE PRACTITIONER

## 2022-11-15 RX ORDER — BUPROPION HYDROCHLORIDE 300 MG/1
300 TABLET ORAL EVERY MORNING
Qty: 30 TABLET | Refills: 5 | Status: SHIPPED | OUTPATIENT
Start: 2022-11-15

## 2022-11-15 RX ORDER — LEVOTHYROXINE SODIUM 0.12 MG/1
125 TABLET ORAL DAILY
Qty: 30 TABLET | Refills: 5 | Status: SHIPPED | OUTPATIENT
Start: 2022-11-15

## 2022-11-15 RX ORDER — LAMOTRIGINE 100 MG/1
100 TABLET ORAL 2 TIMES DAILY
Qty: 60 TABLET | Refills: 5 | Status: SHIPPED | OUTPATIENT
Start: 2022-11-15

## 2022-11-15 RX ORDER — ATORVASTATIN CALCIUM 40 MG/1
40 TABLET, FILM COATED ORAL NIGHTLY
Qty: 90 TABLET | Refills: 3 | Status: SHIPPED | OUTPATIENT
Start: 2022-11-15

## 2022-11-15 RX ORDER — BUSPIRONE HYDROCHLORIDE 15 MG/1
15 TABLET ORAL 2 TIMES DAILY
Qty: 60 TABLET | Refills: 5 | Status: SHIPPED | OUTPATIENT
Start: 2022-11-15

## 2022-11-15 RX ORDER — ACETAMINOPHEN AND CODEINE PHOSPHATE 120; 12 MG/5ML; MG/5ML
1 SOLUTION ORAL DAILY
Qty: 84 TABLET | Refills: 4 | Status: SHIPPED | OUTPATIENT
Start: 2022-11-15

## 2022-11-15 RX ORDER — METFORMIN HYDROCHLORIDE 500 MG/1
500 TABLET, EXTENDED RELEASE ORAL 2 TIMES DAILY
Qty: 60 TABLET | Refills: 5 | Status: SHIPPED | OUTPATIENT
Start: 2022-11-15

## 2022-12-03 PROBLEM — E66.812 CLASS 2 SEVERE OBESITY DUE TO EXCESS CALORIES WITH SERIOUS COMORBIDITY AND BODY MASS INDEX (BMI) OF 39.0 TO 39.9 IN ADULT: Status: ACTIVE | Noted: 2022-12-03

## 2022-12-03 PROBLEM — F43.21 ADJUSTMENT DISORDER WITH DEPRESSED MOOD: Chronic | Status: ACTIVE | Noted: 2022-12-03

## 2022-12-03 PROBLEM — E78.5 HYPERLIPIDEMIA: Chronic | Status: ACTIVE | Noted: 2022-12-03

## 2022-12-03 PROBLEM — E03.9 HYPOTHYROIDISM: Chronic | Status: ACTIVE | Noted: 2022-12-03

## 2022-12-03 PROBLEM — E28.2 PCOS (POLYCYSTIC OVARIAN SYNDROME): Chronic | Status: ACTIVE | Noted: 2022-12-03

## 2022-12-03 PROBLEM — E66.01 CLASS 2 SEVERE OBESITY DUE TO EXCESS CALORIES WITH SERIOUS COMORBIDITY AND BODY MASS INDEX (BMI) OF 39.0 TO 39.9 IN ADULT: Status: ACTIVE | Noted: 2022-12-03

## 2022-12-09 ENCOUNTER — HOSPITAL ENCOUNTER (OUTPATIENT)
Dept: MAMMOGRAPHY | Facility: HOSPITAL | Age: 42
Discharge: HOME | End: 2022-12-09
Admitting: NURSE PRACTITIONER

## 2022-12-09 DIAGNOSIS — Z12.31 ENCOUNTER FOR SCREENING MAMMOGRAM FOR MALIGNANT NEOPLASM OF BREAST: ICD-10-CM

## 2022-12-09 PROCEDURE — 77067 SCR MAMMO BI INCL CAD: CPT

## 2022-12-09 PROCEDURE — 77063 BREAST TOMOSYNTHESIS BI: CPT

## 2022-12-21 DIAGNOSIS — U07.1 COVID-19: ICD-10-CM

## 2022-12-22 RX ORDER — ALBUTEROL SULFATE 90 UG/1
2 AEROSOL, METERED RESPIRATORY (INHALATION) EVERY 4 HOURS PRN
Qty: 8.5 G | Refills: 0 | Status: SHIPPED | OUTPATIENT
Start: 2022-12-22

## 2023-01-30 ENCOUNTER — OFFICE VISIT (OUTPATIENT)
Dept: FAMILY MEDICINE CLINIC | Age: 43
End: 2023-01-30
Payer: COMMERCIAL

## 2023-01-30 VITALS
SYSTOLIC BLOOD PRESSURE: 133 MMHG | BODY MASS INDEX: 39.68 KG/M2 | HEIGHT: 67 IN | DIASTOLIC BLOOD PRESSURE: 81 MMHG | RESPIRATION RATE: 18 BRPM | HEART RATE: 95 BPM | TEMPERATURE: 98.3 F | WEIGHT: 252.8 LBS

## 2023-01-30 DIAGNOSIS — H92.01 ACUTE OTALGIA, RIGHT: Primary | ICD-10-CM

## 2023-01-30 PROCEDURE — 99213 OFFICE O/P EST LOW 20 MIN: CPT | Performed by: NURSE PRACTITIONER

## 2023-01-30 NOTE — PROGRESS NOTES
Aurora Mendez presents to [facility].    Chief Complaint:  Earache (Right ear pain, started a couple days ago. Reports pain is severe and worsening each day and reports feels like a lot of pressure is present. )         History of Present Illness:  URI  When did symptoms started : 4 days ago   Any exposures:none   Symptoms: right ear pain, itchy, pressure   Treatment tried: none       Past Medical History changes:         Covid + -    PREVENTIVE HEALTH MAINTENANCE               MAMMOGRAM: Done within last 2 years and results in are chart was last done 2020 with normal results     PAP SMEAR: hysterectomy         Surgical History:         Hysterectomy: stage III dysplasia;         Family History:         Positive for Coronary Artery Disease ( pat. GM ) and Myocardial Infarction ( mat. GF ).      Positive for Type 2 Diabetes ( pat. GM ).  Father:  at age 63; Cause of death was pancreatic cacner     Mother: Cause of death was Neuroendocrine tumor - Age 67     Son(s): Healthy; 2 son(s) total     Daughter(s): 1 daughter(s) total; 1 ;  pneumonia/ at 17         Social History:     Occupation: [email or web address] supervisor     Marital Status:      Children: 3 children       Review of Systems:  Review of Systems   Constitutional: Negative for fever.   HENT: Negative for postnasal drip and sore throat.    Respiratory: Negative for cough and shortness of breath.    Cardiovascular: Negative for chest pain.          Current Outpatient Medications:   •  albuterol sulfate  (90 Base) MCG/ACT inhaler, Inhale 2 puffs Every 4 (Four) Hours As Needed for Wheezing., Disp: 8.5 g, Rfl: 0  •  atorvastatin (LIPITOR) 40 MG tablet, Take 1 tablet by mouth Every Night., Disp: 90 tablet, Rfl: 3  •  buPROPion XL (WELLBUTRIN XL) 300 MG 24 hr tablet, Take 1 tablet by mouth Every Morning., Disp: 30 tablet, Rfl: 5  •  busPIRone (BUSPAR) 15 MG tablet, Take 1 tablet by mouth 2 (Two) Times a  "Day., Disp: 60 tablet, Rfl: 5  •  cholecalciferol (VITAMIN D3) 25 MCG (1000 UT) tablet, Take 1,000 Units by mouth Daily., Disp: , Rfl:   •  ibuprofen (ADVIL,MOTRIN) 800 MG tablet, Take 1 tablet by mouth Every 8 (Eight) Hours As Needed for Moderate Pain ., Disp: 20 tablet, Rfl: 0  •  lamoTRIgine (LaMICtal) 100 MG tablet, Take 1 tablet by mouth 2 (Two) Times a Day., Disp: 60 tablet, Rfl: 5  •  levothyroxine (SYNTHROID, LEVOTHROID) 125 MCG tablet, Take 1 tablet by mouth Daily., Disp: 30 tablet, Rfl: 5  •  metFORMIN ER (GLUCOPHAGE-XR) 500 MG 24 hr tablet, Take 1 tablet by mouth 2 (Two) Times a Day., Disp: 60 tablet, Rfl: 5  •  norethindrone (MICRONOR) 0.35 MG tablet, Take 1 tablet by mouth Daily., Disp: 84 tablet, Rfl: 4    Vital Signs:   Vitals:    01/30/23 1326   BP: 133/81   BP Location: Right arm   Patient Position: Sitting   Cuff Size: Large Adult   Pulse: 95   Resp: 18   Temp: 98.3 °F (36.8 °C)   TempSrc: Oral   Weight: 115 kg (252 lb 12.8 oz)   Height: 170.2 cm (67\")   PainSc:   6   PainLoc: Ear         Physical Exam:  Physical Exam  Constitutional:       General: She is not in acute distress.     Appearance: Normal appearance.   HENT:      Right Ear: Tympanic membrane, ear canal and external ear normal.      Left Ear: Tympanic membrane, ear canal and external ear normal.      Nose: Nose normal.      Mouth/Throat:      Pharynx: Oropharynx is clear. No posterior oropharyngeal erythema.   Cardiovascular:      Rate and Rhythm: Normal rate and regular rhythm.      Heart sounds: No murmur heard.  Pulmonary:      Effort: Pulmonary effort is normal.      Breath sounds: Normal breath sounds.   Lymphadenopathy:      Cervical: No cervical adenopathy.   Neurological:      Mental Status: She is alert.   Psychiatric:         Mood and Affect: Mood normal.         Behavior: Behavior normal.         Result Review      The following data was reviewed by: ADIS Camarena on 01/30/2023:    Results for orders placed or " performed in visit on 07/25/22   POCT SARS-CoV-2 Antigen ADINA + Flu    Specimen: Swab   Result Value Ref Range    SARS Antigen Detected (A) Not Detected, Presumptive Negative    Influenza A Antigen ADINA Not Detected Not Detected    Influenza B Antigen ADINA Not Detected Not Detected    Internal Control Passed Passed    Lot Number [id number]     Expiration Date 01/29/23                Assessment and Plan:          Diagnoses and all orders for this visit:    1. Acute otalgia, right (Primary)  Assessment & Plan:  Rest, increase fluids, follow up if symptoms progress or change   To try Zyrtec and flonase, discussed frequent ear popping,           Follow Up   Return if symptoms worsen or fail to improve.  Patient was given instructions and counseling regarding her condition or for health maintenance advice. Please see specific information pulled into the AVS if appropriate.

## 2023-01-30 NOTE — ASSESSMENT & PLAN NOTE
Rest, increase fluids, follow up if symptoms progress or change   To try Zyrtec and flonase, discussed frequent ear popping,

## 2023-05-30 DIAGNOSIS — F41.8 DEPRESSION WITH ANXIETY: Chronic | ICD-10-CM

## 2023-05-30 DIAGNOSIS — F43.21 ADJUSTMENT DISORDER WITH DEPRESSED MOOD: Chronic | ICD-10-CM

## 2023-05-30 RX ORDER — BUSPIRONE HYDROCHLORIDE 15 MG/1
TABLET ORAL
Qty: 60 TABLET | Refills: 0 | Status: SHIPPED | OUTPATIENT
Start: 2023-05-30

## 2023-05-30 RX ORDER — BUPROPION HYDROCHLORIDE 300 MG/1
300 TABLET ORAL EVERY MORNING
Qty: 30 TABLET | Refills: 0 | Status: SHIPPED | OUTPATIENT
Start: 2023-05-30

## 2023-05-30 RX ORDER — LAMOTRIGINE 100 MG/1
TABLET ORAL
Qty: 60 TABLET | Refills: 0 | Status: SHIPPED | OUTPATIENT
Start: 2023-05-30

## 2023-06-06 ENCOUNTER — OFFICE VISIT (OUTPATIENT)
Dept: FAMILY MEDICINE CLINIC | Age: 43
End: 2023-06-06
Payer: COMMERCIAL

## 2023-06-06 VITALS
WEIGHT: 255.6 LBS | HEART RATE: 84 BPM | BODY MASS INDEX: 40.12 KG/M2 | HEIGHT: 67 IN | SYSTOLIC BLOOD PRESSURE: 146 MMHG | OXYGEN SATURATION: 96 % | TEMPERATURE: 98.4 F | DIASTOLIC BLOOD PRESSURE: 88 MMHG

## 2023-06-06 DIAGNOSIS — N63.23 MASS OF LOWER OUTER QUADRANT OF LEFT BREAST: Primary | ICD-10-CM

## 2023-06-06 DIAGNOSIS — F43.21 ADJUSTMENT DISORDER WITH DEPRESSED MOOD: Chronic | ICD-10-CM

## 2023-06-06 DIAGNOSIS — G47.33 OBSTRUCTIVE SLEEP APNEA SYNDROME: ICD-10-CM

## 2023-06-06 DIAGNOSIS — E66.01 CLASS 3 SEVERE OBESITY DUE TO EXCESS CALORIES WITH SERIOUS COMORBIDITY AND BODY MASS INDEX (BMI) OF 40.0 TO 44.9 IN ADULT: ICD-10-CM

## 2023-06-06 DIAGNOSIS — D72.829 LEUKOCYTOSIS, UNSPECIFIED TYPE: ICD-10-CM

## 2023-06-06 DIAGNOSIS — G89.29 CHRONIC MIDLINE LOW BACK PAIN WITHOUT SCIATICA: ICD-10-CM

## 2023-06-06 DIAGNOSIS — F41.8 DEPRESSION WITH ANXIETY: Chronic | ICD-10-CM

## 2023-06-06 DIAGNOSIS — Z11.59 SCREENING FOR VIRAL DISEASE: ICD-10-CM

## 2023-06-06 DIAGNOSIS — E03.9 HYPOTHYROIDISM, UNSPECIFIED TYPE: Chronic | ICD-10-CM

## 2023-06-06 DIAGNOSIS — E28.2 PCOS (POLYCYSTIC OVARIAN SYNDROME): Chronic | ICD-10-CM

## 2023-06-06 DIAGNOSIS — Z13.6 SCREENING FOR CARDIOVASCULAR CONDITION: ICD-10-CM

## 2023-06-06 DIAGNOSIS — E78.5 HYPERLIPIDEMIA, UNSPECIFIED HYPERLIPIDEMIA TYPE: Chronic | ICD-10-CM

## 2023-06-06 DIAGNOSIS — M54.50 CHRONIC MIDLINE LOW BACK PAIN WITHOUT SCIATICA: ICD-10-CM

## 2023-06-06 PROCEDURE — 99214 OFFICE O/P EST MOD 30 MIN: CPT | Performed by: NURSE PRACTITIONER

## 2023-06-06 RX ORDER — LAMOTRIGINE 100 MG/1
100 TABLET ORAL 2 TIMES DAILY
Qty: 60 TABLET | Refills: 5 | Status: SHIPPED | OUTPATIENT
Start: 2023-06-06

## 2023-06-06 RX ORDER — ATORVASTATIN CALCIUM 40 MG/1
40 TABLET, FILM COATED ORAL NIGHTLY
Qty: 90 TABLET | Refills: 3 | Status: SHIPPED | OUTPATIENT
Start: 2023-06-06

## 2023-06-06 RX ORDER — BUSPIRONE HYDROCHLORIDE 15 MG/1
15 TABLET ORAL 2 TIMES DAILY
Qty: 60 TABLET | Refills: 5 | Status: SHIPPED | OUTPATIENT
Start: 2023-06-06

## 2023-06-06 RX ORDER — BUPROPION HYDROCHLORIDE 300 MG/1
300 TABLET ORAL EVERY MORNING
Qty: 30 TABLET | Refills: 5 | Status: SHIPPED | OUTPATIENT
Start: 2023-06-06

## 2023-06-06 RX ORDER — LEVOTHYROXINE SODIUM 0.12 MG/1
125 TABLET ORAL DAILY
Qty: 30 TABLET | Refills: 5 | Status: SHIPPED | OUTPATIENT
Start: 2023-06-06

## 2023-06-06 RX ORDER — METFORMIN HYDROCHLORIDE 500 MG/1
500 TABLET, EXTENDED RELEASE ORAL 2 TIMES DAILY
Qty: 60 TABLET | Refills: 5 | Status: SHIPPED | OUTPATIENT
Start: 2023-06-06

## 2023-06-06 NOTE — PROGRESS NOTES
Chief Complaint  Aurora Mendez presents to [facility] for Hyperlipidemia (6 mo. F/U ), Depression, Anxiety, Hypothyroidism, and Med Refill      Subjective     History of Present Illness  Aurora presents today for follow up on hyperlipidemia.  Previous values: Lab Results       Component                Value               Date                       CHOL                     181                 12/01/2021                 CHLPL                    163                 10/28/2020                 TRIG                     175 (H)             12/01/2021                 HDL                      42                  12/01/2021                 LDL                      108 (H)             12/01/2021           ;    Current CVD 10yr risk is The 10-year ASCVD risk score (Stacy RAMAN, et al., 2019) is: 1.2%    Values used to calculate the score:      Age: 42 years      Sex: Female      Is Non- : No      Diabetic: No      Tobacco smoker: No      Systolic Blood Pressure: 146 mmHg      Is BP treated: No      HDL Cholesterol: 42 mg/dL      Total Cholesterol: 181 mg/dL ;    Aurora reports compliant with medication which is atorvastatin (lipitor)    No side effects reported from this medication .   No new concerns to discuss today.    Aurora is here for follow up on depression/anxiety.  She is reporting that her medication is working well without side effects.  Current treatment includes Wellbutrin, buspirone, lamictal      Aurora presents for follow up on hypothyroidism. Denies symptoms of thyroid dysfunction at this time.    Reports daily compliance with levothyroxine (Synthroid)125mcg.  Denies any missed doses.    Last Lab Results       Component                Value               Date                       TSH                      1.460               12/01/2021         Taking Metformin for PCOS  doing well with no complaints      Aurora also reports that she has noticed some  thickening in her left breast.  She is unsure as to how long this has been present.  She denies any family history of breast cancer.  She reports that there is no pain, no skin changes.  Her last mammogram was in December 2022 and was noted to be normal.          Assessment and Plan       Diagnoses and all orders for this visit:    1. Mass of lower outer quadrant of left breast (Primary)  Comments:  We will get a diagnostic mammogram and ultrasound if necessary  Orders:  -     Mammo Diagnostic Digital Tomosynthesis Left With CAD; Future  -     US Breast Left Limited; Future    2. Chronic midline low back pain without sciatica  Comments:  will try conservative therapy, stretching, muscle relaxers- advised of weight loss, may consider PT    3. Leukocytosis, unspecified type  Comments:  Follow-up labs  Orders:  -     CBC & Differential; Future    4. Hyperlipidemia, unspecified hyperlipidemia type  Comments:  continue current treatment Labs recommended  Orders:  -     atorvastatin (LIPITOR) 40 MG tablet; Take 1 tablet by mouth Every Night.  Dispense: 90 tablet; Refill: 3    5. Depression with anxiety  Comments:  continue current treatment plan follow-up psychotherapy as recommended  Orders:  -     buPROPion XL (WELLBUTRIN XL) 300 MG 24 hr tablet; Take 1 tablet by mouth Every Morning.  Dispense: 30 tablet; Refill: 5  -     busPIRone (BUSPAR) 15 MG tablet; Take 1 tablet by mouth 2 (Two) Times a Day.  Dispense: 60 tablet; Refill: 5  -     lamoTRIgine (LaMICtal) 100 MG tablet; Take 1 tablet by mouth 2 (Two) Times a Day.  Dispense: 60 tablet; Refill: 5    6. Adjustment disorder with depressed mood  Comments:  Continue current treatment.  She will continue counseling - should she feel like medication no longer working , will need referral back to NP  Orders:  -     lamoTRIgine (LaMICtal) 100 MG tablet; Take 1 tablet by mouth 2 (Two) Times a Day.  Dispense: 60 tablet; Refill: 5    7. Hypothyroidism, unspecified  type  Comments:  continue current treatment Labs ordered  Orders:  -     TSH Rfx On Abnormal To Free T4; Future  -     levothyroxine (SYNTHROID, LEVOTHROID) 125 MCG tablet; Take 1 tablet by mouth Daily.  Dispense: 30 tablet; Refill: 5    8. PCOS (polycystic ovarian syndrome)  Comments:  continue current treatment   Orders:  -     metFORMIN ER (GLUCOPHAGE-XR) 500 MG 24 hr tablet; Take 1 tablet by mouth 2 (Two) Times a Day.  Dispense: 60 tablet; Refill: 5    9. Obstructive sleep apnea syndrome  Comments:  Insurance to change in July- will  send my chart message when changes for a referral to [facility] sleep center - [place] for referral without appt when called    10. Screening for cardiovascular condition  -     Lipid panel; Future  -     Comprehensive metabolic panel; Future    11. Screening for viral disease  -     Hepatitis C antibody; Future    12. Class 3 severe obesity due to excess calories with serious comorbidity and body mass index (BMI) of 40.0 to 44.9 in adult  Comments:  Weight loss may improve chronic conditions including hyperlipidemia, chronic low back pain, sleep apnea, PCOS        Follow Up   Return in about 6 months (around 12/6/2023) for Recheck, Annual physical.      New Medications Ordered This Visit   Medications   • atorvastatin (LIPITOR) 40 MG tablet     Sig: Take 1 tablet by mouth Every Night.     Dispense:  90 tablet     Refill:  3   • buPROPion XL (WELLBUTRIN XL) 300 MG 24 hr tablet     Sig: Take 1 tablet by mouth Every Morning.     Dispense:  30 tablet     Refill:  5   • busPIRone (BUSPAR) 15 MG tablet     Sig: Take 1 tablet by mouth 2 (Two) Times a Day.     Dispense:  60 tablet     Refill:  5   • lamoTRIgine (LaMICtal) 100 MG tablet     Sig: Take 1 tablet by mouth 2 (Two) Times a Day.     Dispense:  60 tablet     Refill:  5   • levothyroxine (SYNTHROID, LEVOTHROID) 125 MCG tablet     Sig: Take 1 tablet by mouth Daily.     Dispense:  30 tablet     Refill:  5   • metFORMIN ER (GLUCOPHAGE-XR) 500  "MG 24 hr tablet     Sig: Take 1 tablet by mouth 2 (Two) Times a Day.     Dispense:  60 tablet     Refill:  5       Medications Discontinued During This Encounter   Medication Reason   • levothyroxine (SYNTHROID, LEVOTHROID) 125 MCG tablet Reorder   • metFORMIN ER (GLUCOPHAGE-XR) 500 MG 24 hr tablet Reorder   • atorvastatin (LIPITOR) 40 MG tablet Reorder   • lamoTRIgine (LaMICtal) 100 MG tablet Reorder   • busPIRone (BUSPAR) 15 MG tablet Reorder   • buPROPion XL (WELLBUTRIN XL) 300 MG 24 hr tablet Reorder            Review of Systems    Objective     Vitals:    06/06/23 1522   BP: 146/88   BP Location: Left arm   Patient Position: Sitting   Cuff Size: Adult   Pulse: 84   Temp: 98.4 °F (36.9 °C)   TempSrc: Oral   SpO2: 96%   Weight: 116 kg (255 lb 9.6 oz)   Height: 170.2 cm (67\")     Body mass index is 40.03 kg/m².     Physical Exam  Constitutional:       General: She is not in acute distress.     Appearance: Normal appearance. She is obese.   HENT:      Head: Normocephalic.   Cardiovascular:      Rate and Rhythm: Normal rate and regular rhythm.   Pulmonary:      Effort: Pulmonary effort is normal.      Breath sounds: Normal breath sounds.   Chest:   Breasts:     Left: Mass (elongated firm palpable area on left lower outer breast  ;  edges palpated, non tender, mobile) present. No inverted nipple, nipple discharge, skin change or tenderness.   Musculoskeletal:         General: Normal range of motion.   Lymphadenopathy:      Upper Body:      Left upper body: No supraclavicular, axillary or pectoral adenopathy.   Neurological:      General: No focal deficit present.      Mental Status: She is alert and oriented to person, place, and time.   Psychiatric:         Mood and Affect: Mood normal.         Behavior: Behavior normal.            Result Review                       No Known Allergies   Past Medical History:   Diagnosis Date   • Adjustment disorder with depressed mood    • Anogenital (venereal) warts    • " Candidiasis of skin and nail    • Chest pain, unspecified    • Contact with and (suspected) exposure to covid-19    • Contact with and (suspected) exposure to other viral communicable diseases    • Dysthymic disorder    • Furuncle of other sites    • Generalized anxiety disorder    • Hypothyroidism, unspecified    • Major depressive disorder, recurrent severe without psychotic features    • Mixed hyperlipidemia    • Neoplasm of uncertain behavior of skin    • Nicotine dependence, unspecified, uncomplicated    • Pain in left knee    • Pain in right knee    • Polycystic ovarian syndrome    • Psychophysiologic insomnia    • Pure hyperglyceridemia    • Strain of unspecified muscle, fascia and tendon at shoulder and upper arm level, left arm, initial encounter    • Vitamin D deficiency, unspecified      Current Outpatient Medications   Medication Sig Dispense Refill   • albuterol sulfate  (90 Base) MCG/ACT inhaler Inhale 2 puffs Every 4 (Four) Hours As Needed for Wheezing. 8.5 g 0   • atorvastatin (LIPITOR) 40 MG tablet Take 1 tablet by mouth Every Night. 90 tablet 3   • buPROPion XL (WELLBUTRIN XL) 300 MG 24 hr tablet Take 1 tablet by mouth Every Morning. 30 tablet 5   • busPIRone (BUSPAR) 15 MG tablet Take 1 tablet by mouth 2 (Two) Times a Day. 60 tablet 5   • cholecalciferol (VITAMIN D3) 25 MCG (1000 UT) tablet Take 1 tablet by mouth Daily.     • ibuprofen (ADVIL,MOTRIN) 800 MG tablet Take 1 tablet by mouth Every 8 (Eight) Hours As Needed for Moderate Pain . 20 tablet 0   • lamoTRIgine (LaMICtal) 100 MG tablet Take 1 tablet by mouth 2 (Two) Times a Day. 60 tablet 5   • levothyroxine (SYNTHROID, LEVOTHROID) 125 MCG tablet Take 1 tablet by mouth Daily. 30 tablet 5   • metFORMIN ER (GLUCOPHAGE-XR) 500 MG 24 hr tablet Take 1 tablet by mouth 2 (Two) Times a Day. 60 tablet 5   • norethindrone (MICRONOR) 0.35 MG tablet Take 1 tablet by mouth Daily. 84 tablet 4     No current facility-administered medications for this  visit.     Past Surgical History:   Procedure Laterality Date   • HYSTERECTOMY      Hysterectomy: stage III dysplasia;       Health Maintenance Due   Topic Date Due   • TDAP/TD VACCINES (1 - Tdap) Never done      Immunization History   Administered Date(s) Administered   • COVID-19 (PFIZER) Purple Cap Monovalent 10/08/2021   • Covid-19 (Pfizer) Gray Cap Monovalent 12/07/2021   • Flu Vaccine Split Quad 10/30/2017, 10/25/2021   • FluLaval/Fluzone >6mos 11/15/2022   • Influenza, Unspecified 10/30/2017   • PPD Test 01/30/2007         Part of this note may be an electronic transcription/translation of spoken language to printed   text using the Dragon Dictation System.      Gabbie Barnard, APRN

## 2023-06-12 ENCOUNTER — LAB (OUTPATIENT)
Dept: LAB | Facility: HOSPITAL | Age: 43
End: 2023-06-12
Payer: COMMERCIAL

## 2023-06-12 DIAGNOSIS — D72.829 LEUKOCYTOSIS, UNSPECIFIED TYPE: ICD-10-CM

## 2023-06-12 DIAGNOSIS — Z11.59 SCREENING FOR VIRAL DISEASE: ICD-10-CM

## 2023-06-12 DIAGNOSIS — Z13.6 SCREENING FOR CARDIOVASCULAR CONDITION: ICD-10-CM

## 2023-06-12 DIAGNOSIS — E03.9 HYPOTHYROIDISM, UNSPECIFIED TYPE: Chronic | ICD-10-CM

## 2023-06-12 LAB
ALBUMIN SERPL-MCNC: 4.5 G/DL (ref 3.5–5.2)
ALBUMIN/GLOB SERPL: 1.7 G/DL
ALP SERPL-CCNC: 87 U/L (ref 39–117)
ALT SERPL W P-5'-P-CCNC: 21 U/L (ref 1–33)
ANION GAP SERPL CALCULATED.3IONS-SCNC: 11.3 MMOL/L (ref 5–15)
AST SERPL-CCNC: 17 U/L (ref 1–32)
BILIRUB SERPL-MCNC: 0.4 MG/DL (ref 0–1.2)
BUN SERPL-MCNC: 11 MG/DL (ref 6–20)
BUN/CREAT SERPL: 14.5 (ref 7–25)
CALCIUM SPEC-SCNC: 9.4 MG/DL (ref 8.6–10.5)
CHLORIDE SERPL-SCNC: 102 MMOL/L (ref 98–107)
CHOLEST SERPL-MCNC: 154 MG/DL (ref 0–200)
CO2 SERPL-SCNC: 24.7 MMOL/L (ref 22–29)
CREAT SERPL-MCNC: 0.76 MG/DL (ref 0.57–1)
EGFRCR SERPLBLD CKD-EPI 2021: 99.9 ML/MIN/1.73
GLOBULIN UR ELPH-MCNC: 2.7 GM/DL
GLUCOSE SERPL-MCNC: 87 MG/DL (ref 65–99)
HCV AB SER DONR QL: NORMAL
HDLC SERPL-MCNC: 41 MG/DL (ref 40–60)
LDLC SERPL CALC-MCNC: 95 MG/DL (ref 0–100)
LDLC/HDLC SERPL: 2.28 {RATIO}
POTASSIUM SERPL-SCNC: 3.9 MMOL/L (ref 3.5–5.2)
PROT SERPL-MCNC: 7.2 G/DL (ref 6–8.5)
SODIUM SERPL-SCNC: 138 MMOL/L (ref 136–145)
TRIGL SERPL-MCNC: 98 MG/DL (ref 0–150)
TSH SERPL DL<=0.05 MIU/L-ACNC: 3.07 UIU/ML (ref 0.27–4.2)
VLDLC SERPL-MCNC: 18 MG/DL (ref 5–40)

## 2023-06-12 PROCEDURE — 80053 COMPREHEN METABOLIC PANEL: CPT

## 2023-06-12 PROCEDURE — 84443 ASSAY THYROID STIM HORMONE: CPT

## 2023-06-12 PROCEDURE — 86803 HEPATITIS C AB TEST: CPT

## 2023-06-12 PROCEDURE — 36415 COLL VENOUS BLD VENIPUNCTURE: CPT

## 2023-06-12 PROCEDURE — 85025 COMPLETE CBC W/AUTO DIFF WBC: CPT

## 2023-06-12 PROCEDURE — 80061 LIPID PANEL: CPT

## 2023-06-12 PROCEDURE — 85007 BL SMEAR W/DIFF WBC COUNT: CPT

## 2023-06-13 ENCOUNTER — OFFICE VISIT (OUTPATIENT)
Dept: FAMILY MEDICINE CLINIC | Age: 43
End: 2023-06-13
Payer: COMMERCIAL

## 2023-06-13 VITALS
TEMPERATURE: 98.2 F | OXYGEN SATURATION: 99 % | HEIGHT: 67 IN | WEIGHT: 256.6 LBS | SYSTOLIC BLOOD PRESSURE: 146 MMHG | HEART RATE: 75 BPM | BODY MASS INDEX: 40.27 KG/M2 | DIASTOLIC BLOOD PRESSURE: 87 MMHG

## 2023-06-13 DIAGNOSIS — R42 DIZZINESS: Primary | ICD-10-CM

## 2023-06-13 LAB
BASOPHILS # BLD MANUAL: 0.11 10*3/MM3 (ref 0–0.2)
BASOPHILS NFR BLD MANUAL: 1 % (ref 0–1.5)
DEPRECATED RDW RBC AUTO: 40.1 FL (ref 37–54)
EOSINOPHIL # BLD MANUAL: 0.34 10*3/MM3 (ref 0–0.4)
EOSINOPHIL NFR BLD MANUAL: 3 % (ref 0.3–6.2)
ERYTHROCYTE [DISTWIDTH] IN BLOOD BY AUTOMATED COUNT: 12.7 % (ref 12.3–15.4)
HCT VFR BLD AUTO: 37.2 % (ref 34–46.6)
HGB BLD-MCNC: 12.8 G/DL (ref 12–15.9)
LYMPHOCYTES # BLD MANUAL: 3.87 10*3/MM3 (ref 0.7–3.1)
LYMPHOCYTES NFR BLD MANUAL: 4 % (ref 5–12)
MCH RBC QN AUTO: 29.9 PG (ref 26.6–33)
MCHC RBC AUTO-ENTMCNC: 34.4 G/DL (ref 31.5–35.7)
MCV RBC AUTO: 86.9 FL (ref 79–97)
MONOCYTES # BLD: 0.46 10*3/MM3 (ref 0.1–0.9)
NEUTROPHILS # BLD AUTO: 6.6 10*3/MM3 (ref 1.7–7)
NEUTROPHILS NFR BLD MANUAL: 58 % (ref 42.7–76)
PLAT MORPH BLD: NORMAL
PLATELET # BLD AUTO: 398 10*3/MM3 (ref 140–450)
PMV BLD AUTO: 9.2 FL (ref 6–12)
POIKILOCYTOSIS BLD QL SMEAR: ABNORMAL
RBC # BLD AUTO: 4.28 10*6/MM3 (ref 3.77–5.28)
VARIANT LYMPHS NFR BLD MANUAL: 34 % (ref 19.6–45.3)
WBC MORPH BLD: NORMAL
WBC NRBC COR # BLD: 11.38 10*3/MM3 (ref 3.4–10.8)

## 2023-06-13 NOTE — PROGRESS NOTES
"  Aurora's presentation was discussed with Dr. Quiñones, who is the on-call physician today.  Her constant dizziness as well as her description of the dizziness are not 100% consistent with peripheral vertigo.  She needs a head CT to rule out central vertigo or more serious cause of her dizziness.  I strongly encouraged her to go by EMS but she refused.  She does have a  here who will drive her to the emergency department for further evaluation.  Maddy LANDERS RN called report to the ER.    Diagnoses and all orders for this visit:    1. Dizziness (Primary)      Subjective     CHIEF COMPLAINT    Chief Complaint   Patient presents with    Dizziness     Ongoing since this am, pt states she just now started to feel nauseous.     Labs Only     Pt would like to discuss previous labs that were drawn yesterday.              History of Present Illness  This is a 42-year-old female presenting to the clinic with complaint of dizziness today.  She states that this started initially around 11 AM and lasted 10 or 15 minutes.  She had no symptoms from about 1115 until 145 when it started again.  Since that time it has been constant.  She describes it as feeling \"fuzzy\" and \"off balance.\"  She states she has to focus very hard to walk straight.            Review of Systems   Constitutional:  Negative for chills and fever.   HENT:  Positive for rhinorrhea (typical, seasonally). Negative for congestion, ear pain, sinus pressure and sinus pain.    Eyes:  Negative for visual disturbance.   Respiratory:  Negative for shortness of breath.    Cardiovascular:  Negative for chest pain.   Gastrointestinal:  Positive for nausea. Negative for vomiting.   Musculoskeletal:  Positive for gait problem.   Neurological:  Positive for dizziness. Negative for speech difficulty, weakness, numbness and headaches.          Past Medical History:   Diagnosis Date    Adjustment disorder with depressed mood     Anogenital (venereal) warts     Candidiasis of " skin and nail     Chest pain, unspecified     Contact with and (suspected) exposure to covid-19     Contact with and (suspected) exposure to other viral communicable diseases     Dysthymic disorder     Furuncle of other sites     Generalized anxiety disorder     Hypothyroidism, unspecified     Major depressive disorder, recurrent severe without psychotic features     Mixed hyperlipidemia     Neoplasm of uncertain behavior of skin     Nicotine dependence, unspecified, uncomplicated     Pain in left knee     Pain in right knee     Polycystic ovarian syndrome     Psychophysiologic insomnia     Pure hyperglyceridemia     Strain of unspecified muscle, fascia and tendon at shoulder and upper arm level, left arm, initial encounter     Vitamin D deficiency, unspecified             Past Surgical History:   Procedure Laterality Date    HYSTERECTOMY      Hysterectomy: stage III dysplasia;             Family History   Problem Relation Age of Onset    Other Mother          Neuroendocrine tumor - cause of death     Pancreatic cancer Father         cause of death     Heart attack Maternal Grandfather     Coronary artery disease Paternal Grandmother     Diabetes type II Paternal Grandmother     Pneumonia Daughter             Social History     Socioeconomic History    Marital status:     Number of children: 3   Tobacco Use    Smoking status: Former     Packs/day: 0.50     Years: 27.00     Pack years: 13.50     Types: Cigarettes     Start date:      Quit date: 2022     Years since quittin.5    Smokeless tobacco: Never   Vaping Use    Vaping Use: Never used   Substance and Sexual Activity    Alcohol use: Not Currently    Drug use: Never     Comment: NEGATIVE     Sexual activity: Defer            No Known Allergies         Current Outpatient Medications on File Prior to Visit   Medication Sig Dispense Refill    albuterol sulfate  (90 Base) MCG/ACT inhaler Inhale 2 puffs Every 4 (Four) Hours As Needed for  "Wheezing. 8.5 g 0    atorvastatin (LIPITOR) 40 MG tablet Take 1 tablet by mouth Every Night. 90 tablet 3    buPROPion XL (WELLBUTRIN XL) 300 MG 24 hr tablet Take 1 tablet by mouth Every Morning. 30 tablet 5    busPIRone (BUSPAR) 15 MG tablet Take 1 tablet by mouth 2 (Two) Times a Day. 60 tablet 5    cholecalciferol (VITAMIN D3) 25 MCG (1000 UT) tablet Take 1 tablet by mouth Daily.      ibuprofen (ADVIL,MOTRIN) 800 MG tablet Take 1 tablet by mouth Every 8 (Eight) Hours As Needed for Moderate Pain . 20 tablet 0    lamoTRIgine (LaMICtal) 100 MG tablet Take 1 tablet by mouth 2 (Two) Times a Day. 60 tablet 5    levothyroxine (SYNTHROID, LEVOTHROID) 125 MCG tablet Take 1 tablet by mouth Daily. 30 tablet 5    metFORMIN ER (GLUCOPHAGE-XR) 500 MG 24 hr tablet Take 1 tablet by mouth 2 (Two) Times a Day. 60 tablet 5    norethindrone (MICRONOR) 0.35 MG tablet Take 1 tablet by mouth Daily. 84 tablet 4     No current facility-administered medications on file prior to visit.            /87 (BP Location: Left arm, Patient Position: Sitting, Cuff Size: Large Adult)   Pulse 75   Temp 98.2 °F (36.8 °C) (Oral)   Ht 170.2 cm (67.01\")   Wt 116 kg (256 lb 9.6 oz)   SpO2 99% Comment: room air  BMI 40.18 kg/m²          Objective     Physical Exam  Vitals and nursing note reviewed.   Constitutional:       General: She is not in acute distress.     Appearance: Normal appearance.   HENT:      Head: Normocephalic and atraumatic.      Right Ear: Tympanic membrane, ear canal and external ear normal.      Left Ear: Tympanic membrane, ear canal and external ear normal.   Eyes:      Extraocular Movements: Extraocular movements intact.      Conjunctiva/sclera: Conjunctivae normal.      Pupils: Pupils are equal, round, and reactive to light.   Cardiovascular:      Rate and Rhythm: Normal rate and regular rhythm.      Heart sounds: Normal heart sounds.   Pulmonary:      Effort: Pulmonary effort is normal. No respiratory distress.      " Breath sounds: Normal breath sounds.   Neurological:      General: No focal deficit present.      Mental Status: She is alert and oriented to person, place, and time.      Cranial Nerves: Cranial nerves 2-12 are intact. No cranial nerve deficit or facial asymmetry.      Sensory: No sensory deficit.      Motor: Motor function is intact. No weakness (5/5  strength, dorsiflexion, and pedal flexion b/l).      Coordination: Romberg sign positive. Finger-Nose-Finger Test normal. Rapid alternating movements normal.      Gait: Gait is intact.      Comments:      Psychiatric:         Mood and Affect: Mood normal.         Behavior: Behavior normal.               Aurora's presentation was discussed with Dr. Quiñones, who is the on-call physician today.  Her constant dizziness as well as her description of the dizziness are not 100% consistent with peripheral vertigo.  She needs a head CT to rule out central vertigo or more serious cause of her dizziness.  I strongly encouraged her to go by EMS but she refused.  She does have a  here who will drive her to the emergency department for further evaluation.  Maddy LANDERS RN called report to the ER.    Diagnoses and all orders for this visit:    1. Dizziness (Primary)                           FOR FULL DISCHARGE INSTRUCTIONS/COMMENTS/HANDOUTS please see the   AVS

## 2023-09-22 ENCOUNTER — OFFICE VISIT (OUTPATIENT)
Dept: FAMILY MEDICINE CLINIC | Age: 43
End: 2023-09-22
Payer: COMMERCIAL

## 2023-09-22 VITALS
SYSTOLIC BLOOD PRESSURE: 140 MMHG | WEIGHT: 255.6 LBS | HEART RATE: 92 BPM | HEIGHT: 67 IN | DIASTOLIC BLOOD PRESSURE: 84 MMHG | BODY MASS INDEX: 40.12 KG/M2

## 2023-09-22 DIAGNOSIS — R03.0 ELEVATED BLOOD PRESSURE READING: ICD-10-CM

## 2023-09-22 DIAGNOSIS — B37.2 INTERTRIGINOUS CANDIDIASIS: Primary | ICD-10-CM

## 2023-09-22 PROCEDURE — 99213 OFFICE O/P EST LOW 20 MIN: CPT | Performed by: NURSE PRACTITIONER

## 2023-09-22 RX ORDER — NYSTATIN 100000 [USP'U]/G
POWDER TOPICAL 2 TIMES DAILY
Qty: 60 G | Refills: 0 | Status: SHIPPED | OUTPATIENT
Start: 2023-09-22

## 2023-09-22 RX ORDER — HYDROXYZINE HYDROCHLORIDE 25 MG/1
25 TABLET, FILM COATED ORAL 3 TIMES DAILY PRN
Qty: 30 TABLET | Refills: 0 | Status: SHIPPED | OUTPATIENT
Start: 2023-09-22

## 2023-09-22 NOTE — PROGRESS NOTES
"Aurora Mendez presents to [facility] with complaint of  Rash (Under bilateral breast x 1 week, pt states she has tried at home care with no relief. )    SUBJECTIVE  Rash  This is a recurrent problem. Episode onset: 11 days ago. The problem has been gradually worsening since onset. Location: under both breasts. The rash is characterized by itchiness, pain and redness. She was exposed to nothing. Pertinent negatives include no anorexia, congestion, cough, diarrhea, eye pain, facial edema, fatigue, fever, joint pain, nail changes, rhinorrhea, shortness of breath, sore throat or vomiting. Treatments tried: dessenex, abx ointment. The treatment provided no relief.     Of note, her blood pressure is elevated in office today.  Past 2 office visits also show elevated blood pressure reading in the 140s systolic.  Patient has never been diagnosed with hypertension.    OBJECTIVE  Vital Signs:   /84 (BP Location: Right arm, Patient Position: Sitting)   Pulse 92   Ht 170.2 cm (67.01\")   Wt 116 kg (255 lb 9.6 oz)   BMI 40.02 kg/m²       Physical Exam  Vitals reviewed.   Constitutional:       General: She is not in acute distress.     Appearance: Normal appearance. She is not ill-appearing.   HENT:      Head: Normocephalic and atraumatic.      Nose: Nose normal.      Mouth/Throat:      Mouth: Mucous membranes are moist.      Pharynx: Oropharynx is clear.   Cardiovascular:      Rate and Rhythm: Normal rate and regular rhythm.      Pulses: Normal pulses.      Heart sounds: Normal heart sounds.   Pulmonary:      Effort: Pulmonary effort is normal.      Breath sounds: Normal breath sounds.   Musculoskeletal:      Cervical back: Neck supple.   Skin:     General: Skin is warm and dry.      Findings: Erythema (noted under bilateral breasts) and rash present.   Neurological:      General: No focal deficit present.      Mental Status: She is alert and oriented to person, place, and time. Mental " status is at baseline.   Psychiatric:         Mood and Affect: Mood normal.         Behavior: Behavior normal.         Judgment: Judgment normal.          ASSESSMENT AND PLAN:  Diagnoses and all orders for this visit:    1. Intertriginous candidiasis (Primary)  -     nystatin (MYCOSTATIN) 766751 UNIT/GM powder; Apply  topically to the appropriate area as directed 2 (Two) Times a Day.  Dispense: 60 g; Refill: 0  -     hydrOXYzine (ATARAX) 25 MG tablet; Take 1 tablet by mouth 3 (Three) Times a Day As Needed for Itching.  Dispense: 30 tablet; Refill: 0    2. Elevated blood pressure reading      Suspect rash is likely due to yeast.  She was given nystatin powder.  Patient was encouraged to keep area clean and dry as much as possible.  She was instructed to wash area with Dial soap, pat dry, apply nystatin powder twice per day.  Patient will also try inserting pillowcases underneath of her breasts to help with moisture absorption and skin friction.  She was also given hydroxyzine to help with the itchiness.  For her elevated blood pressure, she was encouraged to get a blood pressure cuff to have at home.  She will check her blood pressure at home daily over the next week.  Patient was encouraged to follow-up in office if she sees that her blood pressure is consistently greater than 135/85 at home as this would reflect HTN.      Follow Up   Return if symptoms worsen or fail to improve. Patient to notify office with any acute concerns or issues.  Patient verbalizes understanding, agrees with plan of care and has no further questions upon discharge.     Patient was given instructions and counseling regarding her condition or for health maintenance advice. Please see specific information pulled into the AVS if appropriate.     Discussed the importance of following up with any needed screening tests/labs/specialist appointments and any requested follow-up recommended by me today. Importance of maintaining follow-up discussed  and patient accepts that missed appointments can delay diagnosis and potentially lead to worsening of conditions.    Part of this note may be an electronic transcription/translation of spoken language to printed text using the Dragon Dictation System.

## 2023-09-26 ENCOUNTER — TELEPHONE (OUTPATIENT)
Dept: FAMILY MEDICINE CLINIC | Age: 43
End: 2023-09-26
Payer: COMMERCIAL

## 2023-09-26 DIAGNOSIS — B37.2 INTERTRIGINOUS CANDIDIASIS: Primary | ICD-10-CM

## 2023-09-26 RX ORDER — CLOTRIMAZOLE AND BETAMETHASONE DIPROPIONATE 10; .64 MG/G; MG/G
1 CREAM TOPICAL 2 TIMES DAILY PRN
Qty: 45 G | Refills: 0 | Status: SHIPPED | OUTPATIENT
Start: 2023-09-26

## 2023-09-26 NOTE — TELEPHONE ENCOUNTER
Caller: Aurora Mendez    Relationship: Self    Best call back number: [phone]    What is the best time to reach you: ANYTIME    Who are you requesting to speak with (clinical staff, provider,  specific staff member): NURSE/ADIS SUE       What was the call regarding: PATIENT CALLING STATING THAT MEDICATION THAT WAS GIVEN ON 9/22 IS NOT WORKING AND PATIENT STATES SHE FEELS WORSE AND WANTED TO SEE WHAT SHE NEEDS TO DO NEXT.

## 2023-09-26 NOTE — TELEPHONE ENCOUNTER
Pt ret'd call stating her rash has spread, it is a little more deeper red and the itching is worse, the powder is burning her especially in the crease of her breast. She stopped using this, please adv.

## 2023-10-27 ENCOUNTER — OFFICE VISIT (OUTPATIENT)
Dept: FAMILY MEDICINE CLINIC | Age: 43
End: 2023-10-27
Payer: COMMERCIAL

## 2023-10-27 VITALS
DIASTOLIC BLOOD PRESSURE: 85 MMHG | TEMPERATURE: 98 F | BODY MASS INDEX: 40.74 KG/M2 | OXYGEN SATURATION: 99 % | HEIGHT: 67 IN | HEART RATE: 85 BPM | WEIGHT: 259.6 LBS | SYSTOLIC BLOOD PRESSURE: 152 MMHG

## 2023-10-27 DIAGNOSIS — R21 RASH: Primary | ICD-10-CM

## 2023-10-27 RX ORDER — TRIAMCINOLONE ACETONIDE 1 MG/G
1 OINTMENT TOPICAL 2 TIMES DAILY
Qty: 15 G | Refills: 0 | Status: SHIPPED | OUTPATIENT
Start: 2023-10-27

## 2023-10-27 RX ORDER — HYDROXYZINE HYDROCHLORIDE 25 MG/1
25 TABLET, FILM COATED ORAL 3 TIMES DAILY PRN
Qty: 30 TABLET | Refills: 0 | Status: SHIPPED | OUTPATIENT
Start: 2023-10-27

## 2023-10-27 NOTE — PROGRESS NOTES
"  Diagnoses and all orders for this visit:    1. Rash (Primary)  Comments:  Exact diagnosis unclear.  Not concerning for life-threatening rash.  We will treat pruritus as below.  Urgent referral to dermatology placed.  Orders:  -     hydrOXYzine (ATARAX) 25 MG tablet; Take 1 tablet by mouth 3 (Three) Times a Day As Needed for Itching.  Dispense: 30 tablet; Refill: 0  -     triamcinolone (KENALOG) 0.1 % ointment; Apply 1 application  topically to the appropriate area as directed 2 (Two) Times a Day.  Dispense: 15 g; Refill: 0  -     Ambulatory Referral to Dermatology            Subjective     CHIEF COMPLAINT    Chief Complaint   Patient presents with    Rash     (B) arms, (B) legs, and abdomin. X 3 weeks. Severe itching.             History of Present Illness  This is a 42-year-old female presenting to the clinic complaining of a \"rash\" on her arms, legs, and stomach for the last 3 weeks.  The rash is very itchy and starts out as a small red bump.  It is very hard for her not to scratch so it typically progresses to a scab before clearing.  She does not live with anyone and has not had any known contacts with a similar rash.  She has checked her bed and has not seen any bedbugs.  Has not had any known exposures.  She denies any purulent drainage or other signs of infection.            Review of Systems   Constitutional:  Negative for chills and fever.   HENT:  Negative for facial swelling and trouble swallowing.    Respiratory:  Negative for shortness of breath and wheezing.    Gastrointestinal:  Negative for nausea and vomiting.   Skin:  Positive for rash.            Past Medical History:   Diagnosis Date    Adjustment disorder with depressed mood     Anogenital (venereal) warts     Candidiasis of skin and nail     Chest pain, unspecified     Contact with and (suspected) exposure to covid-19     Contact with and (suspected) exposure to other viral communicable diseases     Dysthymic disorder     Furuncle of other " sites     Generalized anxiety disorder     Hypothyroidism, unspecified     Major depressive disorder, recurrent severe without psychotic features     Mixed hyperlipidemia     Neoplasm of uncertain behavior of skin     Nicotine dependence, unspecified, uncomplicated     Pain in left knee     Pain in right knee     Polycystic ovarian syndrome     Psychophysiologic insomnia     Pure hyperglyceridemia     Strain of unspecified muscle, fascia and tendon at shoulder and upper arm level, left arm, initial encounter     Vitamin D deficiency, unspecified             Past Surgical History:   Procedure Laterality Date    HYSTERECTOMY      Hysterectomy: stage III dysplasia;             Family History   Problem Relation Age of Onset    Other Mother          Neuroendocrine tumor - cause of death     Pancreatic cancer Father         cause of death     Heart attack Maternal Grandfather     Coronary artery disease Paternal Grandmother     Diabetes type II Paternal Grandmother     Pneumonia Daughter             Social History     Socioeconomic History    Marital status:     Number of children: 3   Tobacco Use    Smoking status: Former     Packs/day: 0.50     Years: 27.00     Additional pack years: 0.00     Total pack years: 13.50     Types: Cigarettes     Start date:      Quit date: 2022     Years since quittin.9    Smokeless tobacco: Never   Vaping Use    Vaping Use: Never used   Substance and Sexual Activity    Alcohol use: Not Currently    Drug use: Never     Comment: NEGATIVE     Sexual activity: Defer            No Known Allergies         Current Outpatient Medications on File Prior to Visit   Medication Sig Dispense Refill    albuterol sulfate  (90 Base) MCG/ACT inhaler Inhale 2 puffs Every 4 (Four) Hours As Needed for Wheezing. 8.5 g 0    atorvastatin (LIPITOR) 40 MG tablet Take 1 tablet by mouth Every Night. 90 tablet 3    buPROPion XL (WELLBUTRIN XL) 300 MG 24 hr tablet Take 1 tablet by mouth Every  "Morning. 30 tablet 5    busPIRone (BUSPAR) 15 MG tablet Take 1 tablet by mouth 2 (Two) Times a Day. 60 tablet 5    cholecalciferol (VITAMIN D3) 25 MCG (1000 UT) tablet Take 1 tablet by mouth Daily.      clotrimazole-betamethasone (LOTRISONE) 1-0.05 % cream Apply 1 application  topically to the appropriate area as directed 2 (Two) Times a Day As Needed (rash). 45 g 0    ibuprofen (ADVIL,MOTRIN) 800 MG tablet Take 1 tablet by mouth Every 8 (Eight) Hours As Needed for Moderate Pain . 20 tablet 0    lamoTRIgine (LaMICtal) 100 MG tablet Take 1 tablet by mouth 2 (Two) Times a Day. 60 tablet 5    levothyroxine (SYNTHROID, LEVOTHROID) 125 MCG tablet Take 1 tablet by mouth Daily. 30 tablet 5    metFORMIN ER (GLUCOPHAGE-XR) 500 MG 24 hr tablet Take 1 tablet by mouth 2 (Two) Times a Day. 60 tablet 5    norethindrone (MICRONOR) 0.35 MG tablet Take 1 tablet by mouth Daily. 84 tablet 4    nystatin (MYCOSTATIN) 502956 UNIT/GM powder Apply  topically to the appropriate area as directed 2 (Two) Times a Day. 60 g 0    [DISCONTINUED] hydrOXYzine (ATARAX) 25 MG tablet Take 1 tablet by mouth 3 (Three) Times a Day As Needed for Itching. 30 tablet 0     No current facility-administered medications on file prior to visit.            /85   Pulse 85   Temp 98 °F (36.7 °C) (Oral)   Ht 170.2 cm (67.01\")   Wt 118 kg (259 lb 9.6 oz)   SpO2 99% Comment: room air  BMI 40.65 kg/m²          Objective     Physical Exam  Vitals and nursing note reviewed.   Constitutional:       General: She is not in acute distress.     Appearance: Normal appearance.   Pulmonary:      Effort: Pulmonary effort is normal. No respiratory distress.   Skin:     General: Skin is warm and dry.      Findings: Lesion present.   Neurological:      Mental Status: She is alert and oriented to person, place, and time.   Psychiatric:         Mood and Affect: Mood normal.         Behavior: Behavior normal.                                   Diagnoses and all orders for " this visit:    1. Rash (Primary)  Comments:  Exact diagnosis unclear.  Not concerning for life-threatening rash.  We will treat pruritus as below.  Urgent referral to dermatology placed.  Orders:  -     hydrOXYzine (ATARAX) 25 MG tablet; Take 1 tablet by mouth 3 (Three) Times a Day As Needed for Itching.  Dispense: 30 tablet; Refill: 0  -     triamcinolone (KENALOG) 0.1 % ointment; Apply 1 application  topically to the appropriate area as directed 2 (Two) Times a Day.  Dispense: 15 g; Refill: 0  -     Ambulatory Referral to Dermatology                           FOR FULL DISCHARGE INSTRUCTIONS/COMMENTS/HANDOUTS please see the   AVS

## 2023-11-09 ENCOUNTER — OFFICE VISIT (OUTPATIENT)
Dept: FAMILY MEDICINE CLINIC | Age: 43
End: 2023-11-09
Payer: COMMERCIAL

## 2023-11-09 VITALS
HEIGHT: 67 IN | WEIGHT: 260 LBS | BODY MASS INDEX: 40.81 KG/M2 | DIASTOLIC BLOOD PRESSURE: 100 MMHG | HEART RATE: 94 BPM | SYSTOLIC BLOOD PRESSURE: 149 MMHG

## 2023-11-09 DIAGNOSIS — I10 PRIMARY HYPERTENSION: Primary | ICD-10-CM

## 2023-11-09 DIAGNOSIS — E66.01 MORBID OBESITY WITH BMI OF 40.0-44.9, ADULT: ICD-10-CM

## 2023-11-09 PROBLEM — U07.1 COVID-19: Status: RESOLVED | Noted: 2021-08-03 | Resolved: 2023-11-09

## 2023-11-09 PROBLEM — H92.01 ACUTE OTALGIA, RIGHT: Status: RESOLVED | Noted: 2023-01-30 | Resolved: 2023-11-09

## 2023-11-09 PROBLEM — J06.9 UPPER RESPIRATORY TRACT INFECTION: Status: RESOLVED | Noted: 2021-08-03 | Resolved: 2023-11-09

## 2023-11-09 PROBLEM — J20.8 ACUTE BRONCHITIS DUE TO OTHER SPECIFIED ORGANISMS: Status: RESOLVED | Noted: 2021-08-03 | Resolved: 2023-11-09

## 2023-11-09 PROCEDURE — 99214 OFFICE O/P EST MOD 30 MIN: CPT | Performed by: NURSE PRACTITIONER

## 2023-11-09 RX ORDER — LISINOPRIL 10 MG/1
10 TABLET ORAL DAILY
Qty: 30 TABLET | Refills: 0 | Status: SHIPPED | OUTPATIENT
Start: 2023-11-09

## 2023-11-09 NOTE — ASSESSMENT & PLAN NOTE
Patient's (Body mass index is 40.71 kg/m².) indicates that they are morbidly/severely obese (BMI > 40 or > 35 with obesity - related health condition) with health conditions that include hypertension and dyslipidemias . Weight is unchanged. BMI  is above average; BMI management plan is completed. We discussed portion control and increasing exercise.

## 2023-11-09 NOTE — ASSESSMENT & PLAN NOTE
Hypertension is newly identified.  Dietary sodium restriction.  Weight loss.  Regular aerobic exercise.  Ambulatory blood pressure monitoring.  Start lisinopril 10 mg daily.  Patient was educated on medication side effects.  Blood pressure will be reassessed in 4 weeks, she has routine follow-up scheduled with her PCP early December.

## 2023-11-09 NOTE — PROGRESS NOTES
"Aurora Mendez presents to [facility] with complaint of  Hypertension (Ongoing for a few weeks. )    SUBJECTIVE  History of Present Illness    Patient is here for elevated blood pressure readings.  She has been checking her blood pressure at home and her average blood pressure is around 140-150/90s.  He did have a blood pressure log but forgot to bring this in to her appointment today.  I actually saw the patient back in September for intertriginous and discussed patient's consistently elevated blood pressures with her.  She was recommended to keep an eye on her blood pressures at home and follow-up if elevated.  Patient has never been diagnosed with hypertension nor has she been on antihypertensives in the past.  She does mention having an increase in headaches over the past week.  She has been taking Tylenol and ibuprofen for this which is somewhat effective.  Patient does admit to eating an unhealthy lifestyle, does not eat healthy foods does not exercise regularly.  She says she does eat salty foods regularly.  She did successfully stop smoking 1 year ago.    OBJECTIVE  Vital Signs:   /100 (BP Location: Left arm, Patient Position: Sitting)   Pulse 94   Ht 170.2 cm (67.01\")   Wt 118 kg (260 lb)   BMI 40.71 kg/m²       Physical Exam  Vitals reviewed.   Constitutional:       General: She is not in acute distress.     Appearance: Normal appearance. She is morbidly obese. She is not ill-appearing.   HENT:      Head: Normocephalic and atraumatic.      Nose: Nose normal.      Mouth/Throat:      Mouth: Mucous membranes are moist.      Pharynx: Oropharynx is clear.   Cardiovascular:      Rate and Rhythm: Normal rate and regular rhythm.      Pulses: Normal pulses.      Heart sounds: Normal heart sounds.   Pulmonary:      Effort: Pulmonary effort is normal.      Breath sounds: Normal breath sounds.   Musculoskeletal:      Cervical back: Neck supple.   Skin:     General: Skin is " warm and dry.   Neurological:      General: No focal deficit present.      Mental Status: She is alert and oriented to person, place, and time. Mental status is at baseline.   Psychiatric:         Mood and Affect: Mood normal.         Behavior: Behavior normal.         Judgment: Judgment normal.              ASSESSMENT AND PLAN:  Diagnoses and all orders for this visit:    1. Primary hypertension (Primary)  Assessment & Plan:  Hypertension is newly identified.  Dietary sodium restriction.  Weight loss.  Regular aerobic exercise.  Ambulatory blood pressure monitoring.  Start lisinopril 10 mg daily.  Patient was educated on medication side effects.  Blood pressure will be reassessed in 4 weeks, she has routine follow-up scheduled with her PCP early December.    Orders:  -     lisinopril (PRINIVIL,ZESTRIL) 10 MG tablet; Take 1 tablet by mouth Daily.  Dispense: 30 tablet; Refill: 0    2. Morbid obesity with BMI of 40.0-44.9, adult  Assessment & Plan:  Patient's (Body mass index is 40.71 kg/m².) indicates that they are morbidly/severely obese (BMI > 40 or > 35 with obesity - related health condition) with health conditions that include hypertension and dyslipidemias . Weight is unchanged. BMI  is above average; BMI management plan is completed. We discussed portion control and increasing exercise.           Follow Up   Return in about 1 month (around 12/9/2023) for Next scheduled follow up. Patient to notify office with any acute concerns or issues.  Patient verbalizes understanding, agrees with plan of care and has no further questions upon discharge.     Patient was given instructions and counseling regarding her condition or for health maintenance advice. Please see specific information pulled into the AVS if appropriate.     Discussed the importance of following up with any needed screening tests/labs/specialist appointments and any requested follow-up recommended by me today. Importance of maintaining follow-up  discussed and patient accepts that missed appointments can delay diagnosis and potentially lead to worsening of conditions.    Part of this note may be an electronic transcription/translation of spoken language to printed text using the Dragon Dictation System.

## 2023-12-02 DIAGNOSIS — I10 PRIMARY HYPERTENSION: ICD-10-CM

## 2023-12-04 RX ORDER — LISINOPRIL 10 MG/1
10 TABLET ORAL DAILY
Qty: 30 TABLET | Refills: 0 | Status: SHIPPED | OUTPATIENT
Start: 2023-12-04 | End: 2023-12-06 | Stop reason: SDUPTHER

## 2023-12-06 ENCOUNTER — OFFICE VISIT (OUTPATIENT)
Dept: FAMILY MEDICINE CLINIC | Age: 43
End: 2023-12-06
Payer: COMMERCIAL

## 2023-12-06 VITALS
OXYGEN SATURATION: 97 % | WEIGHT: 258 LBS | BODY MASS INDEX: 40.49 KG/M2 | TEMPERATURE: 97.9 F | HEART RATE: 98 BPM | DIASTOLIC BLOOD PRESSURE: 66 MMHG | HEIGHT: 67 IN | SYSTOLIC BLOOD PRESSURE: 115 MMHG

## 2023-12-06 DIAGNOSIS — E03.9 HYPOTHYROIDISM, UNSPECIFIED TYPE: Chronic | ICD-10-CM

## 2023-12-06 DIAGNOSIS — E28.2 PCOS (POLYCYSTIC OVARIAN SYNDROME): Chronic | ICD-10-CM

## 2023-12-06 DIAGNOSIS — F43.21 ADJUSTMENT DISORDER WITH DEPRESSED MOOD: Chronic | ICD-10-CM

## 2023-12-06 DIAGNOSIS — E78.5 HYPERLIPIDEMIA, UNSPECIFIED HYPERLIPIDEMIA TYPE: Chronic | ICD-10-CM

## 2023-12-06 DIAGNOSIS — I10 PRIMARY HYPERTENSION: ICD-10-CM

## 2023-12-06 DIAGNOSIS — R06.09 DYSPNEA ON EXERTION: ICD-10-CM

## 2023-12-06 DIAGNOSIS — F41.8 DEPRESSION WITH ANXIETY: Chronic | ICD-10-CM

## 2023-12-06 DIAGNOSIS — Z00.00 ROUTINE GENERAL MEDICAL EXAMINATION AT A HEALTH CARE FACILITY: Primary | ICD-10-CM

## 2023-12-06 RX ORDER — BUPROPION HYDROCHLORIDE 300 MG/1
300 TABLET ORAL EVERY MORNING
Qty: 30 TABLET | Refills: 5 | Status: SHIPPED | OUTPATIENT
Start: 2023-12-06

## 2023-12-06 RX ORDER — ALBUTEROL SULFATE 90 UG/1
2 AEROSOL, METERED RESPIRATORY (INHALATION) EVERY 4 HOURS PRN
Qty: 8.5 G | Refills: 0 | Status: SHIPPED | OUTPATIENT
Start: 2023-12-06

## 2023-12-06 RX ORDER — LEVOTHYROXINE SODIUM 0.12 MG/1
125 TABLET ORAL DAILY
Qty: 30 TABLET | Refills: 5 | Status: SHIPPED | OUTPATIENT
Start: 2023-12-06

## 2023-12-06 RX ORDER — ACETAMINOPHEN AND CODEINE PHOSPHATE 120; 12 MG/5ML; MG/5ML
1 SOLUTION ORAL DAILY
Qty: 84 TABLET | Refills: 4 | Status: SHIPPED | OUTPATIENT
Start: 2023-12-06

## 2023-12-06 RX ORDER — METFORMIN HYDROCHLORIDE 500 MG/1
500 TABLET, EXTENDED RELEASE ORAL 2 TIMES DAILY
Qty: 60 TABLET | Refills: 5 | Status: SHIPPED | OUTPATIENT
Start: 2023-12-06

## 2023-12-06 RX ORDER — BUSPIRONE HYDROCHLORIDE 15 MG/1
15 TABLET ORAL 2 TIMES DAILY
Qty: 60 TABLET | Refills: 5 | Status: SHIPPED | OUTPATIENT
Start: 2023-12-06

## 2023-12-06 RX ORDER — LISINOPRIL 10 MG/1
10 TABLET ORAL DAILY
Qty: 30 TABLET | Refills: 5 | Status: SHIPPED | OUTPATIENT
Start: 2023-12-06

## 2023-12-06 RX ORDER — LAMOTRIGINE 100 MG/1
100 TABLET ORAL 2 TIMES DAILY
Qty: 60 TABLET | Refills: 5 | Status: SHIPPED | OUTPATIENT
Start: 2023-12-06

## 2023-12-06 RX ORDER — ATORVASTATIN CALCIUM 40 MG/1
40 TABLET, FILM COATED ORAL NIGHTLY
Qty: 30 TABLET | Refills: 11 | Status: SHIPPED | OUTPATIENT
Start: 2023-12-06

## 2023-12-06 NOTE — PROGRESS NOTES
Chief Complaint  Aurora Mendez presents to [facility] for Annual Exam (Patient requesting refill on inhaler ), Hyperlipidemia (6 mo. F/U ), Depression, Hypothyroidism, and Hypertension      Subjective     History of Present Illness  Aurora is here today for annual exam.   Last annual exam was 1 year  Last eye exam:  years  PROVIDER:  n/a  Last dental exam:   6 months    PROVIDER:  [facility]  Last menstrual period:  hysterectomy.  Last Completed Pap Smear       This patient has no relevant Health Maintenance data.          Last Completed Mammogram            Ordered - MAMMOGRAM (Yearly) Ordered on 6/6/2023 12/09/2022  Mammo Screening Digital Tomosynthesis Bilateral With CAD    01/20/2020  Mammo Diagnostic Digital Tomosynthesis Left With CAD    01/16/2020  Mammo Screening Digital Tomosynthesis Bilateral With CAD                    Diet / exercise:   No special diet or specific exercise program  Patient's Body mass index is 40.4 kg/m². indicating that she is obese (BMI >30).  Satisfied with weight?  no    Patient Care Team:  Gabbie Barnard APRN as PCP - General (Nurse Practitioner)     There are no preventive care reminders to display for this patient.    The 10-year ASCVD risk score (Stacy RAMAN, et al., 2019) is: 0.8%    Values used to calculate the score:      Age: 42 years      Sex: Female      Is Non- : No      Diabetic: No      Tobacco smoker: No      Systolic Blood Pressure: 115 mmHg      Is BP treated: Yes      HDL Cholesterol: 41 mg/dL      Total Cholesterol: 154 mg/dL    Was in recently for elevated BP - started on lisinopril for her BP and has been in acceptable range ever since.  Doing well with no SE.      Aurora is here for follow up on depression / anxiety.  She is reporting that her medication is working well without side effects.  Current treatment includes  Wellbutrin  mg , buspirone 15 mg twice daily, and Lamictal  100mg twice daily .  She will take Hydroxyzine PRN     Taking atorvastatin daily   discussed CVD risk is very low but based on her previous LDL levels, suspect genetic component.  She will work on diet and exercise     Aurora presents for follow up on hypothyroidism. Denies symptoms of thyroid dysfunction at this time.    Reports daily compliance with levothyroxine (Synthroid) 125 mcg.  Denies any missed doses.    Last   Lab Results   Component Value Date    TSH 2.469 06/13/2023        For PCOS  taking micronor and metformin   doing well with no concerns   Assessment and Plan     -advised of a well balanced diet and exercise as tolerated  -annual wellness exams are recommended  -discussed health care maintenance and gaps in care and orders have been placed as necessary and as willing by the patient.     Diagnoses and all orders for this visit:    1. Routine general medical examination at a health care facility (Primary)    2. Dyspnea on exertion  Comments:  discussed weight loss and increase activity may help  Orders:  -     albuterol sulfate  (90 Base) MCG/ACT inhaler; Inhale 2 puffs Every 4 (Four) Hours As Needed for Wheezing.  Dispense: 8.5 g; Refill: 0    3. Depression with anxiety  Comments:  continue current treatment plan follow-up psychotherapy as recommended  Orders:  -     lamoTRIgine (LaMICtal) 100 MG tablet; Take 1 tablet by mouth 2 (Two) Times a Day.  Dispense: 60 tablet; Refill: 5  -     buPROPion XL (WELLBUTRIN XL) 300 MG 24 hr tablet; Take 1 tablet by mouth Every Morning.  Dispense: 30 tablet; Refill: 5  -     busPIRone (BUSPAR) 15 MG tablet; Take 1 tablet by mouth 2 (Two) Times a Day.  Dispense: 60 tablet; Refill: 5    4. Adjustment disorder with depressed mood  Comments:  Continue current treatment.  She will continue counseling on as needed basis, will need referral back to [facility] if medication changes appropriate  Orders:  -     lamoTRIgine (LaMICtal) 100 MG tablet; Take 1 tablet by mouth 2  (Two) Times a Day.  Dispense: 60 tablet; Refill: 5    5. Hyperlipidemia, unspecified hyperlipidemia type  Comments:  continue current treatment Labs recommended at next appt  Orders:  -     atorvastatin (LIPITOR) 40 MG tablet; Take 1 tablet by mouth Every Night.  Dispense: 30 tablet; Refill: 11    6. Hypothyroidism, unspecified type  Comments:  continue current treatment labs at next appt  Orders:  -     levothyroxine (SYNTHROID, LEVOTHROID) 125 MCG tablet; Take 1 tablet by mouth Daily.  Dispense: 30 tablet; Refill: 5    7. PCOS (polycystic ovarian syndrome)  Comments:  continue current treatment   Orders:  -     metFORMIN ER (GLUCOPHAGE-XR) 500 MG 24 hr tablet; Take 1 tablet by mouth 2 (Two) Times a Day.  Dispense: 60 tablet; Refill: 5  -     norethindrone (MICRONOR) 0.35 MG tablet; Take 1 tablet by mouth Daily.  Dispense: 84 tablet; Refill: 4    8. Primary hypertension  Comments:  continue current treatment and follow up at next scheduled appt  Orders:  -     lisinopril (PRINIVIL,ZESTRIL) 10 MG tablet; Take 1 tablet by mouth Daily.  Dispense: 30 tablet; Refill: 5             Follow Up   Return in about 6 months (around 6/6/2024) for Recheck.  Future Appointments   Date Time Provider Department Center   6/5/2024  5:00 PM Gabbie Barnard APRN [facility]       New Medications Ordered This Visit   Medications    albuterol sulfate  (90 Base) MCG/ACT inhaler     Sig: Inhale 2 puffs Every 4 (Four) Hours As Needed for Wheezing.     Dispense:  8.5 g     Refill:  0    lamoTRIgine (LaMICtal) 100 MG tablet     Sig: Take 1 tablet by mouth 2 (Two) Times a Day.     Dispense:  60 tablet     Refill:  5    buPROPion XL (WELLBUTRIN XL) 300 MG 24 hr tablet     Sig: Take 1 tablet by mouth Every Morning.     Dispense:  30 tablet     Refill:  5    busPIRone (BUSPAR) 15 MG tablet     Sig: Take 1 tablet by mouth 2 (Two) Times a Day.     Dispense:  60 tablet     Refill:  5    atorvastatin (LIPITOR) 40 MG tablet     Sig: Take  1 tablet by mouth Every Night.     Dispense:  30 tablet     Refill:  11    levothyroxine (SYNTHROID, LEVOTHROID) 125 MCG tablet     Sig: Take 1 tablet by mouth Daily.     Dispense:  30 tablet     Refill:  5    metFORMIN ER (GLUCOPHAGE-XR) 500 MG 24 hr tablet     Sig: Take 1 tablet by mouth 2 (Two) Times a Day.     Dispense:  60 tablet     Refill:  5    norethindrone (MICRONOR) 0.35 MG tablet     Sig: Take 1 tablet by mouth Daily.     Dispense:  84 tablet     Refill:  4    lisinopril (PRINIVIL,ZESTRIL) 10 MG tablet     Sig: Take 1 tablet by mouth Daily.     Dispense:  30 tablet     Refill:  5       Medications Discontinued During This Encounter   Medication Reason    clotrimazole-betamethasone (LOTRISONE) 1-0.05 % cream *Therapy completed    norethindrone (MICRONOR) 0.35 MG tablet Reorder    albuterol sulfate  (90 Base) MCG/ACT inhaler Reorder    atorvastatin (LIPITOR) 40 MG tablet Reorder    buPROPion XL (WELLBUTRIN XL) 300 MG 24 hr tablet Reorder    busPIRone (BUSPAR) 15 MG tablet Reorder    lamoTRIgine (LaMICtal) 100 MG tablet Reorder    levothyroxine (SYNTHROID, LEVOTHROID) 125 MCG tablet Reorder    metFORMIN ER (GLUCOPHAGE-XR) 500 MG 24 hr tablet Reorder    lisinopril (PRINIVIL,ZESTRIL) 10 MG tablet Reorder            Review of Systems   Constitutional:  Negative for fatigue.   HENT:  Negative for congestion and sinus pressure.    Eyes:  Negative for blurred vision and visual disturbance.   Respiratory:  Negative for cough and shortness of breath.    Cardiovascular:  Negative for chest pain.   Gastrointestinal:  Negative for constipation and diarrhea.   Genitourinary:  Negative for dysuria and frequency.   Musculoskeletal:  Positive for arthralgias (controlled). Negative for back pain.   Allergic/Immunologic: Positive for environmental allergies (recommend OTC).   Neurological:  Negative for dizziness and headache.   Psychiatric/Behavioral:  Positive for depressed mood (well controlled on medication).   "      Objective     Vitals:    12/06/23 1647   BP: 115/66   BP Location: Left arm   Patient Position: Sitting   Cuff Size: Adult   Pulse: 98   Temp: 97.9 °F (36.6 °C)   TempSrc: Oral   SpO2: 97%   Weight: 117 kg (258 lb)   Height: 170.2 cm (67.01\")     Body mass index is 40.4 kg/m².            Physical Exam  Vitals reviewed.   Constitutional:       Appearance: Normal appearance. She is well-developed. She is obese. She is not ill-appearing.   HENT:      Head: Normocephalic and atraumatic.      Right Ear: Tympanic membrane, ear canal and external ear normal.      Left Ear: Tympanic membrane, ear canal and external ear normal.      Mouth/Throat:      Lips: Pink.      Mouth: Mucous membranes are moist.      Pharynx: Oropharynx is clear. Uvula midline. No oropharyngeal exudate.   Eyes:      Extraocular Movements: Extraocular movements intact.      Conjunctiva/sclera: Conjunctivae normal.      Pupils: Pupils are equal, round, and reactive to light.   Neck:      Thyroid: No thyromegaly.   Cardiovascular:      Rate and Rhythm: Normal rate and regular rhythm.      Heart sounds: No murmur heard.     No friction rub. No gallop.   Pulmonary:      Effort: Pulmonary effort is normal.      Breath sounds: Normal breath sounds. No wheezing or rhonchi.   Abdominal:      General: Bowel sounds are normal. There is no distension.      Palpations: Abdomen is soft.      Tenderness: There is no abdominal tenderness.   Musculoskeletal:      Cervical back: Normal range of motion.   Lymphadenopathy:      Head:      Right side of head: No submandibular adenopathy.      Left side of head: No submandibular adenopathy.      Cervical: No cervical adenopathy.   Skin:     General: Skin is warm and dry.      Findings: No lesion or rash.   Neurological:      Mental Status: She is alert and oriented to person, place, and time.      Cranial Nerves: No cranial nerve deficit.      Gait: Gait is intact.   Psychiatric:         Mood and Affect: Mood and " affect normal.         Behavior: Behavior normal.         Thought Content: Thought content normal.         Judgment: Judgment normal.            Result Review                       No Known Allergies   Past Medical History:   Diagnosis Date    Adjustment disorder with depressed mood     Anogenital (venereal) warts     Candidiasis of skin and nail     Chest pain, unspecified     Contact with and (suspected) exposure to covid-19     Contact with and (suspected) exposure to other viral communicable diseases     Dysthymic disorder     Furuncle of other sites     Generalized anxiety disorder     Hypothyroidism, unspecified     Major depressive disorder, recurrent severe without psychotic features     Mixed hyperlipidemia     Neoplasm of uncertain behavior of skin     Nicotine dependence, unspecified, uncomplicated     Pain in left knee     Pain in right knee     Polycystic ovarian syndrome     Psychophysiologic insomnia     Pure hyperglyceridemia     Strain of unspecified muscle, fascia and tendon at shoulder and upper arm level, left arm, initial encounter     Vitamin D deficiency, unspecified      Current Outpatient Medications   Medication Sig Dispense Refill    albuterol sulfate  (90 Base) MCG/ACT inhaler Inhale 2 puffs Every 4 (Four) Hours As Needed for Wheezing. 8.5 g 0    atorvastatin (LIPITOR) 40 MG tablet Take 1 tablet by mouth Every Night. 30 tablet 11    buPROPion XL (WELLBUTRIN XL) 300 MG 24 hr tablet Take 1 tablet by mouth Every Morning. 30 tablet 5    busPIRone (BUSPAR) 15 MG tablet Take 1 tablet by mouth 2 (Two) Times a Day. 60 tablet 5    cholecalciferol (VITAMIN D3) 25 MCG (1000 UT) tablet Take 1 tablet by mouth Daily.      hydrOXYzine (ATARAX) 25 MG tablet Take 1 tablet by mouth 3 (Three) Times a Day As Needed for Itching. 30 tablet 0    ibuprofen (ADVIL,MOTRIN) 800 MG tablet Take 1 tablet by mouth Every 8 (Eight) Hours As Needed for Moderate Pain . 20 tablet 0    lamoTRIgine (LaMICtal) 100 MG  tablet Take 1 tablet by mouth 2 (Two) Times a Day. 60 tablet 5    levothyroxine (SYNTHROID, LEVOTHROID) 125 MCG tablet Take 1 tablet by mouth Daily. 30 tablet 5    lisinopril (PRINIVIL,ZESTRIL) 10 MG tablet Take 1 tablet by mouth Daily. 30 tablet 5    metFORMIN ER (GLUCOPHAGE-XR) 500 MG 24 hr tablet Take 1 tablet by mouth 2 (Two) Times a Day. 60 tablet 5    norethindrone (MICRONOR) 0.35 MG tablet Take 1 tablet by mouth Daily. 84 tablet 4    nystatin (MYCOSTATIN) 867351 UNIT/GM powder Apply  topically to the appropriate area as directed 2 (Two) Times a Day. 60 g 0    triamcinolone (KENALOG) 0.1 % ointment Apply 1 application  topically to the appropriate area as directed 2 (Two) Times a Day. 15 g 0     No current facility-administered medications for this visit.     Past Surgical History:   Procedure Laterality Date    HYSTERECTOMY      Hysterectomy: stage III dysplasia;     TONSILLECTOMY AND ADENOIDECTOMY  2019      There are no preventive care reminders to display for this patient.   Immunization History   Administered Date(s) Administered    COVID-19 (PFIZER) Purple Cap Monovalent 10/08/2021    Covid-19 (Pfizer) Gray Cap Monovalent 12/07/2021    Flu Vaccine Split Quad 10/30/2017, 10/25/2021    Flublok 18+yrs 10/10/2023    Fluzone (or Fluarix & Flulaval for VFC) >6mos 11/15/2022    Influenza, Unspecified 10/30/2017    PPD Test 01/30/2007         Part of this note may be an electronic transcription/translation of spoken language to printed   text using the Dragon Dictation System.      ADIS Rodriguez

## 2024-06-05 ENCOUNTER — OFFICE VISIT (OUTPATIENT)
Dept: FAMILY MEDICINE CLINIC | Age: 44
End: 2024-06-05
Payer: COMMERCIAL

## 2024-06-05 VITALS
BODY MASS INDEX: 38.14 KG/M2 | DIASTOLIC BLOOD PRESSURE: 64 MMHG | HEIGHT: 67 IN | WEIGHT: 243 LBS | OXYGEN SATURATION: 97 % | HEART RATE: 85 BPM | TEMPERATURE: 98.4 F | SYSTOLIC BLOOD PRESSURE: 114 MMHG

## 2024-06-05 DIAGNOSIS — F41.8 DEPRESSION WITH ANXIETY: Chronic | ICD-10-CM

## 2024-06-05 DIAGNOSIS — R06.09 DYSPNEA ON EXERTION: ICD-10-CM

## 2024-06-05 DIAGNOSIS — G47.30 SLEEP APNEA, UNSPECIFIED TYPE: ICD-10-CM

## 2024-06-05 DIAGNOSIS — I10 PRIMARY HYPERTENSION: ICD-10-CM

## 2024-06-05 DIAGNOSIS — Z13.6 SCREENING FOR CARDIOVASCULAR CONDITION: ICD-10-CM

## 2024-06-05 DIAGNOSIS — L98.9 SKIN LESION OF LEFT ARM: Primary | ICD-10-CM

## 2024-06-05 DIAGNOSIS — F43.21 ADJUSTMENT DISORDER WITH DEPRESSED MOOD: Chronic | ICD-10-CM

## 2024-06-05 DIAGNOSIS — E03.9 HYPOTHYROIDISM, UNSPECIFIED TYPE: Chronic | ICD-10-CM

## 2024-06-05 DIAGNOSIS — Z12.31 SCREENING MAMMOGRAM FOR BREAST CANCER: ICD-10-CM

## 2024-06-05 DIAGNOSIS — E66.01 CLASS 2 SEVERE OBESITY DUE TO EXCESS CALORIES WITH SERIOUS COMORBIDITY AND BODY MASS INDEX (BMI) OF 38.0 TO 38.9 IN ADULT: ICD-10-CM

## 2024-06-05 DIAGNOSIS — Z80.8 FAMILY HISTORY OF MALIGNANT MELANOMA: ICD-10-CM

## 2024-06-05 DIAGNOSIS — E28.2 PCOS (POLYCYSTIC OVARIAN SYNDROME): Chronic | ICD-10-CM

## 2024-06-05 DIAGNOSIS — R21 RASH: ICD-10-CM

## 2024-06-05 PROCEDURE — 99214 OFFICE O/P EST MOD 30 MIN: CPT | Performed by: NURSE PRACTITIONER

## 2024-06-05 RX ORDER — ACETAMINOPHEN AND CODEINE PHOSPHATE 120; 12 MG/5ML; MG/5ML
1 SOLUTION ORAL DAILY
Qty: 84 TABLET | Refills: 4 | Status: SHIPPED | OUTPATIENT
Start: 2024-06-05

## 2024-06-05 RX ORDER — ALBUTEROL SULFATE 90 UG/1
2 AEROSOL, METERED RESPIRATORY (INHALATION) EVERY 4 HOURS PRN
Qty: 8.5 G | Refills: 1 | Status: SHIPPED | OUTPATIENT
Start: 2024-06-05

## 2024-06-05 RX ORDER — HYDROXYZINE HYDROCHLORIDE 25 MG/1
25 TABLET, FILM COATED ORAL 3 TIMES DAILY PRN
Qty: 30 TABLET | Refills: 2 | Status: SHIPPED | OUTPATIENT
Start: 2024-06-05

## 2024-06-05 RX ORDER — LAMOTRIGINE 100 MG/1
100 TABLET ORAL 2 TIMES DAILY
Qty: 60 TABLET | Refills: 5 | Status: SHIPPED | OUTPATIENT
Start: 2024-06-05

## 2024-06-05 RX ORDER — LISINOPRIL 10 MG/1
10 TABLET ORAL DAILY
Qty: 30 TABLET | Refills: 5 | Status: SHIPPED | OUTPATIENT
Start: 2024-06-05

## 2024-06-05 RX ORDER — BUPROPION HYDROCHLORIDE 300 MG/1
300 TABLET ORAL EVERY MORNING
Qty: 30 TABLET | Refills: 5 | Status: SHIPPED | OUTPATIENT
Start: 2024-06-05

## 2024-06-05 RX ORDER — BUSPIRONE HYDROCHLORIDE 15 MG/1
15 TABLET ORAL 2 TIMES DAILY
Qty: 60 TABLET | Refills: 5 | Status: SHIPPED | OUTPATIENT
Start: 2024-06-05

## 2024-06-05 NOTE — PROGRESS NOTES
Chief Complaint  Aurora Mendez presents to [facility] for Hyperlipidemia (6 mo. F/U ), Depression, Anxiety, Hypothyroidism, Hypertension, and Polycystic Ovary Syndrome      Subjective     History of Present Illness  Aurora presents today for follow up on hyperlipidemia.  Previous values:   Lab Results   Component Value Date    CHOL 154 06/12/2023    CHLPL 163 10/28/2020    TRIG 98 06/12/2023    HDL 41 06/12/2023    LDL 95 06/12/2023      The 10-year ASCVD risk score (Stacy RAMAN, et al., 2019) is: 0.8%    Values used to calculate the score:      Age: 43 years      Sex: Female      Is Non- : No      Diabetic: No      Tobacco smoker: No      Systolic Blood Pressure: 114 mmHg      Is BP treated: Yes      HDL Cholesterol: 41 mg/dL      Total Cholesterol: 154 mg/dL  Currently taking medication :yes and reports compliant with medication which is atorvastatin (lipitor) 40 mg   No side effects reported from this medication .  No new concerns to discuss today.     Aurora presents today for follow up on Depression, PTSD, and Anxiety   She reports that She Is doing well on current treatment of Lamictal, Wellbutrin , buspirone  hydroxyzine PRN (uses for itching as well)  She denies current suicidal and homicidal ideation.    Current psychotherapy : Yes, describe: PRN  Aurora presents for follow up on hypothyroidism.   has been taking medication as prescribed.    Medication includes :  levothyroxine  125 mcg  Current symptoms include:  denies fatigue, weight changes, heat/cold intolerance, bowel/skin changes or CVS symptoms.   Last   Lab Results   Component Value Date    TSH 2.469 06/13/2023       Regarding PCOS  , taking metformin nad micronor  daily as prescribed.  Has been losing weight       She has been having some episodes of panic attacks requiring her   FMLA  2 days per month   1-2 days per episode x 6 months   she may leave early on days that is needed when she  has flare ups   she has had 2 episodes over the past 6 months     She has also noticed a lesion on her left forearm that has popped up over the past few months.  She is concerned due to her mother diagnosed with a rare type of melanoma.  He lesion has grown and is odd shaped, pink  no pain   not raised     Also needing a new referral for sleep study  off CPAP and needing to get back on while attempting to lose weight     Assessment and Plan       Diagnoses and all orders for this visit:    1. Skin lesion of left arm (Primary)  Comments:  referal derm  Orders:  -     Ambulatory Referral to Dermatology    2. Depression with anxiety  Comments:  continue current treatment plan follow-up psychotherapy as recommended ;  OK FOR FMLA 2 days per month/ 1-2 days per episode x 6 months  Orders:  -     lamoTRIgine (LaMICtal) 100 MG tablet; Take 1 tablet by mouth 2 (Two) Times a Day.  Dispense: 60 tablet; Refill: 5  -     busPIRone (BUSPAR) 15 MG tablet; Take 1 tablet by mouth 2 (Two) Times a Day.  Dispense: 60 tablet; Refill: 5  -     buPROPion XL (WELLBUTRIN XL) 300 MG 24 hr tablet; Take 1 tablet by mouth Every Morning.  Dispense: 30 tablet; Refill: 5    3. Adjustment disorder with depressed mood  Comments:  Continue current treatment.  She will continue counseling on as needed basis, will need referral back to [facility] if medication changes appropriate  Orders:  -     lamoTRIgine (LaMICtal) 100 MG tablet; Take 1 tablet by mouth 2 (Two) Times a Day.  Dispense: 60 tablet; Refill: 5    4. Rash  -     hydrOXYzine (ATARAX) 25 MG tablet; Take 1 tablet by mouth 3 (Three) Times a Day As Needed for Itching.  Dispense: 30 tablet; Refill: 2    5. Sleep apnea, unspecified type  -     Ambulatory Referral to Sleep Medicine    6. Dyspnea on exertion  Comments:  discussed weight loss and increase activity may help  Orders:  -     albuterol sulfate  (90 Base) MCG/ACT inhaler; Inhale 2 puffs Every 4 (Four) Hours As Needed for Wheezing.   Dispense: 8.5 g; Refill: 1    7. PCOS (polycystic ovarian syndrome)  Comments:  continue current treatment   Orders:  -     norethindrone (MICRONOR) 0.35 MG tablet; Take 1 tablet by mouth Daily.  Dispense: 84 tablet; Refill: 4    8. Primary hypertension  Comments:  continue current treatment and follow up at next scheduled appt  Orders:  -     lisinopril (PRINIVIL,ZESTRIL) 10 MG tablet; Take 1 tablet by mouth Daily.  Dispense: 30 tablet; Refill: 5    9. Screening for cardiovascular condition  -     Lipid panel; Future  -     Comprehensive metabolic panel; Future    10. Hypothyroidism, unspecified type  -     TSH Rfx On Abnormal To Free T4; Future    11. Family history of malignant melanoma    12. Screening mammogram for breast cancer  -     Mammo Screening Digital Tomosynthesis Bilateral With CAD; Future    13. Class 2 severe obesity due to excess calories with serious comorbidity and body mass index (BMI) of 38.0 to 38.9 in adult  Comments:  continue with weight loss efforts            Follow Up   Return in about 6 months (around 12/5/2024) for Recheck, Annual physical.  Future Appointments   Date Time Provider Department Center   12/9/2024  4:45 PM Gabbie Barnard APRN [facility]       New Medications Ordered This Visit   Medications    lamoTRIgine (LaMICtal) 100 MG tablet     Sig: Take 1 tablet by mouth 2 (Two) Times a Day.     Dispense:  60 tablet     Refill:  5    hydrOXYzine (ATARAX) 25 MG tablet     Sig: Take 1 tablet by mouth 3 (Three) Times a Day As Needed for Itching.     Dispense:  30 tablet     Refill:  2    busPIRone (BUSPAR) 15 MG tablet     Sig: Take 1 tablet by mouth 2 (Two) Times a Day.     Dispense:  60 tablet     Refill:  5    buPROPion XL (WELLBUTRIN XL) 300 MG 24 hr tablet     Sig: Take 1 tablet by mouth Every Morning.     Dispense:  30 tablet     Refill:  5    albuterol sulfate  (90 Base) MCG/ACT inhaler     Sig: Inhale 2 puffs Every 4 (Four) Hours As Needed for Wheezing.      "Dispense:  8.5 g     Refill:  1    norethindrone (MICRONOR) 0.35 MG tablet     Sig: Take 1 tablet by mouth Daily.     Dispense:  84 tablet     Refill:  4    lisinopril (PRINIVIL,ZESTRIL) 10 MG tablet     Sig: Take 1 tablet by mouth Daily.     Dispense:  30 tablet     Refill:  5       Medications Discontinued During This Encounter   Medication Reason    nystatin (MYCOSTATIN) 388827 UNIT/GM powder *Therapy completed    triamcinolone (KENALOG) 0.1 % ointment *Therapy completed    ibuprofen (ADVIL,MOTRIN) 800 MG tablet *Therapy completed    hydrOXYzine (ATARAX) 25 MG tablet Reorder    lamoTRIgine (LaMICtal) 100 MG tablet Reorder    albuterol sulfate  (90 Base) MCG/ACT inhaler Reorder    buPROPion XL (WELLBUTRIN XL) 300 MG 24 hr tablet Reorder    busPIRone (BUSPAR) 15 MG tablet Reorder    norethindrone (MICRONOR) 0.35 MG tablet Reorder    lisinopril (PRINIVIL,ZESTRIL) 10 MG tablet Reorder          Review of Systems    Objective     Vitals:    06/05/24 1656   BP: 114/64   BP Location: Left arm   Patient Position: Sitting   Cuff Size: Adult   Pulse: 85   Temp: 98.4 °F (36.9 °C)   TempSrc: Oral   SpO2: 97%   Weight: 110 kg (243 lb)   Height: 170.2 cm (67.01\")     Body mass index is 38.05 kg/m².          Physical Exam  Constitutional:       General: She is not in acute distress.     Appearance: Normal appearance. She is obese.   HENT:      Head: Normocephalic.   Cardiovascular:      Rate and Rhythm: Normal rate and regular rhythm.   Pulmonary:      Effort: Pulmonary effort is normal.      Breath sounds: Normal breath sounds.   Musculoskeletal:         General: Normal range of motion.   Neurological:      General: No focal deficit present.      Mental Status: She is alert and oriented to person, place, and time.   Psychiatric:         Mood and Affect: Mood normal.         Behavior: Behavior normal.            Result Review               No Known Allergies   Past Medical History:   Diagnosis Date    Adjustment disorder " with depressed mood     Anogenital (venereal) warts     Candidiasis of skin and nail     Chest pain, unspecified     Contact with and (suspected) exposure to covid-19     Contact with and (suspected) exposure to other viral communicable diseases     Dysthymic disorder     Furuncle of other sites     Generalized anxiety disorder     Hypothyroidism, unspecified     Major depressive disorder, recurrent severe without psychotic features     Mixed hyperlipidemia     Neoplasm of uncertain behavior of skin     Nicotine dependence, unspecified, uncomplicated     Pain in left knee     Pain in right knee     Polycystic ovarian syndrome     Psychophysiologic insomnia     Pure hyperglyceridemia     Strain of unspecified muscle, fascia and tendon at shoulder and upper arm level, left arm, initial encounter     Vitamin D deficiency, unspecified      Current Outpatient Medications   Medication Sig Dispense Refill    albuterol sulfate  (90 Base) MCG/ACT inhaler Inhale 2 puffs Every 4 (Four) Hours As Needed for Wheezing. 8.5 g 1    atorvastatin (LIPITOR) 40 MG tablet Take 1 tablet by mouth Every Night. 30 tablet 11    buPROPion XL (WELLBUTRIN XL) 300 MG 24 hr tablet Take 1 tablet by mouth Every Morning. 30 tablet 5    busPIRone (BUSPAR) 15 MG tablet Take 1 tablet by mouth 2 (Two) Times a Day. 60 tablet 5    cholecalciferol (VITAMIN D3) 25 MCG (1000 UT) tablet Take 1 tablet by mouth Daily.      hydrOXYzine (ATARAX) 25 MG tablet Take 1 tablet by mouth 3 (Three) Times a Day As Needed for Itching. 30 tablet 2    lamoTRIgine (LaMICtal) 100 MG tablet Take 1 tablet by mouth 2 (Two) Times a Day. 60 tablet 5    levothyroxine (SYNTHROID, LEVOTHROID) 125 MCG tablet Take 1 tablet by mouth Daily. 30 tablet 5    lisinopril (PRINIVIL,ZESTRIL) 10 MG tablet Take 1 tablet by mouth Daily. 30 tablet 5    metFORMIN ER (GLUCOPHAGE-XR) 500 MG 24 hr tablet Take 1 tablet by mouth 2 (Two) Times a Day. 60 tablet 5    norethindrone (MICRONOR) 0.35 MG  tablet Take 1 tablet by mouth Daily. 84 tablet 4     No current facility-administered medications for this visit.     Past Surgical History:   Procedure Laterality Date    HYSTERECTOMY      Hysterectomy: stage III dysplasia;     TONSILLECTOMY AND ADENOIDECTOMY  2019      Health Maintenance Due   Topic Date Due    TDAP/TD VACCINES (1 - Tdap) Never done    MAMMOGRAM  12/09/2023    LIPID PANEL  06/12/2024      Immunization History   Administered Date(s) Administered    COVID-19 (PFIZER) Purple Cap Monovalent 10/08/2021    Covid-19 (Pfizer) Gray Cap Monovalent 12/07/2021    Flu Vaccine Split Quad 10/30/2017, 10/25/2021    Flublok 18+yrs 10/10/2023    Fluzone (or Fluarix & Flulaval for VFC) >6mos 11/15/2022    Influenza, Unspecified 10/30/2017    PPD Test 01/30/2007         Part of this note may be an electronic transcription/translation of spoken language to printed   text using the Dragon Dictation System.      ADIS Rodriguez

## 2024-06-25 ENCOUNTER — LAB (OUTPATIENT)
Dept: LAB | Facility: HOSPITAL | Age: 44
End: 2024-06-25
Payer: COMMERCIAL

## 2024-06-25 DIAGNOSIS — E03.9 HYPOTHYROIDISM, UNSPECIFIED TYPE: Chronic | ICD-10-CM

## 2024-06-25 DIAGNOSIS — Z13.6 SCREENING FOR CARDIOVASCULAR CONDITION: ICD-10-CM

## 2024-06-25 LAB
ALBUMIN SERPL-MCNC: 4.1 G/DL (ref 3.5–5.2)
ALBUMIN/GLOB SERPL: 1.4 G/DL
ALP SERPL-CCNC: 86 U/L (ref 39–117)
ALT SERPL W P-5'-P-CCNC: 23 U/L (ref 1–33)
ANION GAP SERPL CALCULATED.3IONS-SCNC: 11 MMOL/L (ref 5–15)
AST SERPL-CCNC: 14 U/L (ref 1–32)
BILIRUB SERPL-MCNC: 0.3 MG/DL (ref 0–1.2)
BUN SERPL-MCNC: 13 MG/DL (ref 6–20)
BUN/CREAT SERPL: 14 (ref 7–25)
CALCIUM SPEC-SCNC: 9.1 MG/DL (ref 8.6–10.5)
CHLORIDE SERPL-SCNC: 106 MMOL/L (ref 98–107)
CHOLEST SERPL-MCNC: 154 MG/DL (ref 0–200)
CO2 SERPL-SCNC: 23 MMOL/L (ref 22–29)
CREAT SERPL-MCNC: 0.93 MG/DL (ref 0.57–1)
EGFRCR SERPLBLD CKD-EPI 2021: 77.9 ML/MIN/1.73
GLOBULIN UR ELPH-MCNC: 2.9 GM/DL
GLUCOSE SERPL-MCNC: 121 MG/DL (ref 65–99)
HDLC SERPL-MCNC: 40 MG/DL (ref 40–60)
LDLC SERPL CALC-MCNC: 97 MG/DL (ref 0–100)
LDLC/HDLC SERPL: 2.39 {RATIO}
POTASSIUM SERPL-SCNC: 4.5 MMOL/L (ref 3.5–5.2)
PROT SERPL-MCNC: 7 G/DL (ref 6–8.5)
SODIUM SERPL-SCNC: 140 MMOL/L (ref 136–145)
TRIGL SERPL-MCNC: 92 MG/DL (ref 0–150)
TSH SERPL DL<=0.05 MIU/L-ACNC: 2.26 UIU/ML (ref 0.27–4.2)
VLDLC SERPL-MCNC: 17 MG/DL (ref 5–40)

## 2024-06-25 PROCEDURE — 36415 COLL VENOUS BLD VENIPUNCTURE: CPT

## 2024-06-25 PROCEDURE — 84443 ASSAY THYROID STIM HORMONE: CPT

## 2024-06-25 PROCEDURE — 80061 LIPID PANEL: CPT

## 2024-06-25 PROCEDURE — 80053 COMPREHEN METABOLIC PANEL: CPT

## 2024-06-27 DIAGNOSIS — E78.5 HYPERLIPIDEMIA, UNSPECIFIED HYPERLIPIDEMIA TYPE: Chronic | ICD-10-CM

## 2024-06-28 RX ORDER — ATORVASTATIN CALCIUM 40 MG/1
40 TABLET, FILM COATED ORAL NIGHTLY
Qty: 30 TABLET | Refills: 11 | OUTPATIENT
Start: 2024-06-28

## 2024-07-08 ENCOUNTER — TELEPHONE (OUTPATIENT)
Dept: FAMILY MEDICINE CLINIC | Age: 44
End: 2024-07-08
Payer: COMMERCIAL

## 2024-07-08 NOTE — TELEPHONE ENCOUNTER
Per last ov on 6/5/24 okay to give FMLA patient is wanting 1-3 days a month 1-8 hours per day, this is different from what was discussed in note, okay to fill out? Please advise kp

## 2024-07-08 NOTE — TELEPHONE ENCOUNTER
PT DROPPED OFF INCOMPLETE FMLA FORMS AND PAID FEE. FORMS HAVE BEEN SCANNED AND PLACED IN [place]'S BOX    MRB 7/8/24

## 2024-07-26 ENCOUNTER — TELEPHONE (OUTPATIENT)
Dept: FAMILY MEDICINE CLINIC | Age: 44
End: 2024-07-26
Payer: COMMERCIAL

## 2024-07-26 DIAGNOSIS — E03.9 HYPOTHYROIDISM, UNSPECIFIED TYPE: Chronic | ICD-10-CM

## 2024-07-26 DIAGNOSIS — E28.2 PCOS (POLYCYSTIC OVARIAN SYNDROME): Chronic | ICD-10-CM

## 2024-07-26 RX ORDER — LEVOTHYROXINE SODIUM 0.12 MG/1
125 TABLET ORAL DAILY
Qty: 30 TABLET | Refills: 5 | Status: SHIPPED | OUTPATIENT
Start: 2024-07-26

## 2024-07-26 RX ORDER — METFORMIN HYDROCHLORIDE 500 MG/1
500 TABLET, EXTENDED RELEASE ORAL 2 TIMES DAILY
Qty: 60 TABLET | Refills: 5 | Status: SHIPPED | OUTPATIENT
Start: 2024-07-26

## 2024-07-26 NOTE — TELEPHONE ENCOUNTER
Caller: Aurora Mendez    Relationship: Self    Best call back number: [phone]     How would you like to receive the form or medical records (pick-up, mail, fax): FAX       If fax, what is the fax number:  2         Timeframe paperwork needed: ASAP     Additional notes:      THE PATIENT IS REQUESTING HER [place] PAPERWORK TO BE FAXED TO HER EMPLOYER.    SHE IS REQUESTING A CALL TO ADVISED WHEN FAXED

## 2024-07-26 NOTE — TELEPHONE ENCOUNTER
Fax would not go through, spoke with patient states her works fax is down and they are trying to fix it so she will be picking up next week. [place]

## 2024-08-02 ENCOUNTER — PATIENT MESSAGE (OUTPATIENT)
Dept: FAMILY MEDICINE CLINIC | Age: 44
End: 2024-08-02
Payer: COMMERCIAL

## 2024-08-02 NOTE — TELEPHONE ENCOUNTER
"From: Aurora Mendez  To: Gabbie Mouser  Sent: 8/2/2024 3:28 PM EDT  Subject: FMLA paperwork    I picked up the paperwork today and when I returned to office I noticed that it states the condition will last 6 months, and the ongoing was crossed out. This indicates to me that I have to renew in 6 months, and has given the impression to my boss that my condition may go away. I am head of  so I have shared my information with my boss (new management from [facility] started in April) so he is aware of what I experience from time to time and that if I miss this is why and that I will notify him when and if it occurs. I had explained my situation to him prior and gave brief history but wanted documentation in my file as piece of mind in case something was to occur again (last major episode requiring time off work was October 2020). Upon review, he thinks this is something that has just started and can possibly be \"cured\". I have explained my past and stated that is not the case, but with medication and coping skills, I have maintained normalcy for the most part. I was wondering if there   was a reason for crossing out the \"ongoing\" and limiting it to 6 months? I don't expect my entire history on this form so please don't think. I just don't understand why it says 6 months when I have been dealing with this for over 2 decades.   "

## 2024-08-15 ENCOUNTER — HOSPITAL ENCOUNTER (OUTPATIENT)
Dept: MAMMOGRAPHY | Facility: HOSPITAL | Age: 44
Discharge: HOME | End: 2024-08-15
Admitting: NURSE PRACTITIONER
Payer: COMMERCIAL

## 2024-08-15 DIAGNOSIS — Z12.31 SCREENING MAMMOGRAM FOR BREAST CANCER: ICD-10-CM

## 2024-08-15 PROCEDURE — 77067 SCR MAMMO BI INCL CAD: CPT

## 2024-08-15 PROCEDURE — 77063 BREAST TOMOSYNTHESIS BI: CPT

## 2024-08-19 DIAGNOSIS — R92.8 ABNORMALITY OF LEFT BREAST ON SCREENING MAMMOGRAM: Primary | ICD-10-CM

## 2024-08-22 ENCOUNTER — HOSPITAL ENCOUNTER (OUTPATIENT)
Dept: MAMMOGRAPHY | Facility: HOSPITAL | Age: 44
Discharge: HOME | End: 2024-08-22
Payer: COMMERCIAL

## 2024-08-22 ENCOUNTER — HOSPITAL ENCOUNTER (OUTPATIENT)
Dept: ULTRASOUND IMAGING | Facility: HOSPITAL | Age: 44
Discharge: HOME | End: 2024-08-22
Payer: COMMERCIAL

## 2024-08-22 DIAGNOSIS — R92.8 ABNORMALITY OF LEFT BREAST ON SCREENING MAMMOGRAM: ICD-10-CM

## 2024-08-22 PROCEDURE — 76642 ULTRASOUND BREAST LIMITED: CPT

## 2024-08-22 PROCEDURE — 77065 DX MAMMO INCL CAD UNI: CPT

## 2024-08-22 PROCEDURE — G0279 TOMOSYNTHESIS, MAMMO: HCPCS

## 2024-09-04 ENCOUNTER — CLINICAL SUPPORT (OUTPATIENT)
Dept: FAMILY MEDICINE CLINIC | Age: 44
End: 2024-09-04
Payer: COMMERCIAL

## 2024-09-04 DIAGNOSIS — T14.8XXA PUNCTURE WOUND: Primary | ICD-10-CM

## 2024-09-04 PROCEDURE — 90471 IMMUNIZATION ADMIN: CPT | Performed by: FAMILY MEDICINE

## 2024-09-04 PROCEDURE — 90715 TDAP VACCINE 7 YRS/> IM: CPT | Performed by: FAMILY MEDICINE

## 2024-12-20 DIAGNOSIS — E78.5 HYPERLIPIDEMIA, UNSPECIFIED HYPERLIPIDEMIA TYPE: Chronic | ICD-10-CM

## 2024-12-20 DIAGNOSIS — F43.21 ADJUSTMENT DISORDER WITH DEPRESSED MOOD: Chronic | ICD-10-CM

## 2024-12-20 DIAGNOSIS — I10 PRIMARY HYPERTENSION: ICD-10-CM

## 2024-12-20 DIAGNOSIS — F41.8 DEPRESSION WITH ANXIETY: Chronic | ICD-10-CM

## 2024-12-21 RX ORDER — LISINOPRIL 10 MG/1
10 TABLET ORAL DAILY
Qty: 30 TABLET | Refills: 5 | Status: SHIPPED | OUTPATIENT
Start: 2024-12-21

## 2024-12-21 RX ORDER — BUSPIRONE HYDROCHLORIDE 15 MG/1
15 TABLET ORAL 2 TIMES DAILY
Qty: 60 TABLET | Refills: 5 | Status: SHIPPED | OUTPATIENT
Start: 2024-12-21

## 2024-12-21 RX ORDER — BUPROPION HYDROCHLORIDE 300 MG/1
300 TABLET ORAL EVERY MORNING
Qty: 30 TABLET | Refills: 5 | Status: SHIPPED | OUTPATIENT
Start: 2024-12-21

## 2024-12-21 RX ORDER — ATORVASTATIN CALCIUM 40 MG/1
40 TABLET, FILM COATED ORAL NIGHTLY
Qty: 30 TABLET | Refills: 11 | Status: SHIPPED | OUTPATIENT
Start: 2024-12-21

## 2024-12-21 RX ORDER — LAMOTRIGINE 100 MG/1
100 TABLET ORAL 2 TIMES DAILY
Qty: 60 TABLET | Refills: 5 | Status: SHIPPED | OUTPATIENT
Start: 2024-12-21

## 2025-01-15 DIAGNOSIS — E03.9 HYPOTHYROIDISM, UNSPECIFIED TYPE: Chronic | ICD-10-CM

## 2025-01-15 DIAGNOSIS — E28.2 PCOS (POLYCYSTIC OVARIAN SYNDROME): Chronic | ICD-10-CM

## 2025-01-15 RX ORDER — LEVOTHYROXINE SODIUM 125 UG/1
125 TABLET ORAL DAILY
Qty: 30 TABLET | Refills: 0 | Status: SHIPPED | OUTPATIENT
Start: 2025-01-15

## 2025-01-15 RX ORDER — METFORMIN HYDROCHLORIDE 500 MG/1
500 TABLET, EXTENDED RELEASE ORAL 2 TIMES DAILY
Qty: 60 TABLET | Refills: 0 | Status: SHIPPED | OUTPATIENT
Start: 2025-01-15

## 2025-02-04 ENCOUNTER — OFFICE VISIT (OUTPATIENT)
Dept: FAMILY MEDICINE CLINIC | Age: 45
End: 2025-02-04
Payer: COMMERCIAL

## 2025-02-04 VITALS
HEART RATE: 78 BPM | BODY MASS INDEX: 40.34 KG/M2 | TEMPERATURE: 98.3 F | OXYGEN SATURATION: 99 % | DIASTOLIC BLOOD PRESSURE: 70 MMHG | SYSTOLIC BLOOD PRESSURE: 133 MMHG | WEIGHT: 257 LBS | HEIGHT: 67 IN

## 2025-02-04 DIAGNOSIS — Z12.11 SCREENING FOR COLON CANCER: ICD-10-CM

## 2025-02-04 DIAGNOSIS — I10 PRIMARY HYPERTENSION: ICD-10-CM

## 2025-02-04 DIAGNOSIS — Z13.6 SCREENING FOR CARDIOVASCULAR CONDITION: ICD-10-CM

## 2025-02-04 DIAGNOSIS — Z00.00 ROUTINE GENERAL MEDICAL EXAMINATION AT A HEALTH CARE FACILITY: Primary | ICD-10-CM

## 2025-02-04 DIAGNOSIS — F43.21 ADJUSTMENT DISORDER WITH DEPRESSED MOOD: Chronic | ICD-10-CM

## 2025-02-04 DIAGNOSIS — E66.01 MORBID OBESITY WITH BMI OF 40.0-44.9, ADULT: ICD-10-CM

## 2025-02-04 DIAGNOSIS — E03.9 HYPOTHYROIDISM, UNSPECIFIED TYPE: Chronic | ICD-10-CM

## 2025-02-04 DIAGNOSIS — F41.8 DEPRESSION WITH ANXIETY: Chronic | ICD-10-CM

## 2025-02-04 DIAGNOSIS — E28.2 PCOS (POLYCYSTIC OVARIAN SYNDROME): Chronic | ICD-10-CM

## 2025-02-04 PROCEDURE — 99396 PREV VISIT EST AGE 40-64: CPT | Performed by: NURSE PRACTITIONER

## 2025-02-04 PROCEDURE — 99214 OFFICE O/P EST MOD 30 MIN: CPT | Performed by: NURSE PRACTITIONER

## 2025-02-04 RX ORDER — LEVOTHYROXINE SODIUM 125 UG/1
125 TABLET ORAL DAILY
Qty: 30 TABLET | Refills: 5 | Status: SHIPPED | OUTPATIENT
Start: 2025-02-04

## 2025-02-04 RX ORDER — BUPROPION HYDROCHLORIDE 300 MG/1
300 TABLET ORAL EVERY MORNING
Qty: 30 TABLET | Refills: 5 | Status: SHIPPED | OUTPATIENT
Start: 2025-02-04

## 2025-02-04 RX ORDER — METFORMIN HYDROCHLORIDE 500 MG/1
500 TABLET, EXTENDED RELEASE ORAL 2 TIMES DAILY
Qty: 60 TABLET | Refills: 5 | Status: SHIPPED | OUTPATIENT
Start: 2025-02-04

## 2025-02-04 RX ORDER — BUSPIRONE HYDROCHLORIDE 15 MG/1
15 TABLET ORAL 2 TIMES DAILY
Qty: 60 TABLET | Refills: 5 | Status: SHIPPED | OUTPATIENT
Start: 2025-02-04

## 2025-02-04 RX ORDER — ACETAMINOPHEN AND CODEINE PHOSPHATE 120; 12 MG/5ML; MG/5ML
1 SOLUTION ORAL DAILY
Qty: 84 TABLET | Refills: 4 | Status: SHIPPED | OUTPATIENT
Start: 2025-02-04

## 2025-02-04 RX ORDER — LISINOPRIL 10 MG/1
10 TABLET ORAL DAILY
Qty: 30 TABLET | Refills: 5 | Status: SHIPPED | OUTPATIENT
Start: 2025-02-04

## 2025-02-04 RX ORDER — LAMOTRIGINE 100 MG/1
100 TABLET ORAL 2 TIMES DAILY
Qty: 60 TABLET | Refills: 5 | Status: SHIPPED | OUTPATIENT
Start: 2025-02-04

## 2025-02-04 NOTE — PROGRESS NOTES
Chief Complaint  Aurora Mendez presents to [facility] for Annual Exam, Hypothyroidism, Hyperlipidemia, and Hypertension    Subjective     Hypothyroidism  Her past medical history is significant for hyperlipidemia.   Hyperlipidemia  Exacerbating diseases include hypothyroidism.   Hypertension      Aurora is here today for annual exam.   Last annual exam was 1 year  Last eye exam: 1 year  PROVIDER: [facility] (Dr. Alcantar)  Last dental exam:   6 months    PROVIDER:  [facility]  Last menstrual period:  hysterectomy ago.  Diet / exercise:   No special diet or specific exercise program  Patient's Body mass index is 40.24 kg/m². indicating that she is morbidly obese (BMI > 40 or > 35 with obesity - related health condition).  Satisfied with weight?  no    Patient Care Team:  Gabbie Barnard APRN as PCP - General (Nurse Practitioner)      Has been having some tingling in arms and legs- seems like has been more frequently     She is still struggling with weight.  She feels like her sleep is better- wearing a cpap sleeping most nights 7-8 hours on average.  She feels like her sleep is ok     Assessment and Plan     Diagnoses and all orders for this visit:    1. Routine general medical examination at a health care facility (Primary)    2. Depression with anxiety  Comments:  continue current treatment plan follow-up psychotherapy as needed ; continue FMLA if needed as previously recommended  Orders:  -     lamoTRIgine (LaMICtal) 100 MG tablet; Take 1 tablet by mouth 2 (Two) Times a Day.  Dispense: 60 tablet; Refill: 5  -     buPROPion XL (WELLBUTRIN XL) 300 MG 24 hr tablet; Take 1 tablet by mouth Every Morning.  Dispense: 30 tablet; Refill: 5  -     busPIRone (BUSPAR) 15 MG tablet; Take 1 tablet by mouth 2 (Two) Times a Day.  Dispense: 60 tablet; Refill: 5    3. Adjustment disorder with depressed mood  -     lamoTRIgine (LaMICtal) 100 MG tablet; Take 1 tablet by mouth 2  (Two) Times a Day.  Dispense: 60 tablet; Refill: 5    4. Hypothyroidism, unspecified type  Comments:  continue current treatment labs  Orders:  -     levothyroxine (SYNTHROID, LEVOTHROID) 125 MCG tablet; Take 1 tablet by mouth Daily.  Dispense: 30 tablet; Refill: 5  -     TSH Rfx On Abnormal To Free T4; Future    5. PCOS (polycystic ovarian syndrome)  Comments:  continue current treatment   Orders:  -     metFORMIN ER (GLUCOPHAGE-XR) 500 MG 24 hr tablet; Take 1 tablet by mouth 2 (Two) Times a Day.  Dispense: 60 tablet; Refill: 5  -     norethindrone (MICRONOR) 0.35 MG tablet; Take 1 tablet by mouth Daily.  Dispense: 84 tablet; Refill: 4    6. Primary hypertension  Comments:  continue current treatment and follow up at next scheduled appt  Orders:  -     lisinopril (PRINIVIL,ZESTRIL) 10 MG tablet; Take 1 tablet by mouth Daily.  Dispense: 30 tablet; Refill: 5    7. Screening for cardiovascular condition  -     Lipid panel; Future  -     Comprehensive metabolic panel; Future    8. Morbid obesity with BMI of 40.0-44.9, adult  Comments:  continue to work on diet, may try cardiometabolic diet plan, must make changes to see different outcome    9. Screening for colon cancer  -     Ambulatory Referral For Screening Colonoscopy            Follow Up   Return in about 6 months (around 8/4/2025) for Recheck.  Future Appointments   Date Time Provider Department Center   8/5/2025  5:00 PM Gabbie Barnard APRN [facility]       New Medications Ordered This Visit   Medications    lamoTRIgine (LaMICtal) 100 MG tablet     Sig: Take 1 tablet by mouth 2 (Two) Times a Day.     Dispense:  60 tablet     Refill:  5    buPROPion XL (WELLBUTRIN XL) 300 MG 24 hr tablet     Sig: Take 1 tablet by mouth Every Morning.     Dispense:  30 tablet     Refill:  5    busPIRone (BUSPAR) 15 MG tablet     Sig: Take 1 tablet by mouth 2 (Two) Times a Day.     Dispense:  60 tablet     Refill:  5    levothyroxine (SYNTHROID, LEVOTHROID) 125 MCG tablet  "    Sig: Take 1 tablet by mouth Daily.     Dispense:  30 tablet     Refill:  5    metFORMIN ER (GLUCOPHAGE-XR) 500 MG 24 hr tablet     Sig: Take 1 tablet by mouth 2 (Two) Times a Day.     Dispense:  60 tablet     Refill:  5    norethindrone (MICRONOR) 0.35 MG tablet     Sig: Take 1 tablet by mouth Daily.     Dispense:  84 tablet     Refill:  4    lisinopril (PRINIVIL,ZESTRIL) 10 MG tablet     Sig: Take 1 tablet by mouth Daily.     Dispense:  30 tablet     Refill:  5       Medications Discontinued During This Encounter   Medication Reason    norethindrone (MICRONOR) 0.35 MG tablet Reorder    lisinopril (PRINIVIL,ZESTRIL) 10 MG tablet Reorder    buPROPion XL (WELLBUTRIN XL) 300 MG 24 hr tablet Reorder    busPIRone (BUSPAR) 15 MG tablet Reorder    lamoTRIgine (LaMICtal) 100 MG tablet Reorder    levothyroxine (SYNTHROID, LEVOTHROID) 125 MCG tablet Reorder    metFORMIN ER (GLUCOPHAGE-XR) 500 MG 24 hr tablet Reorder          Review of Systems    Objective     Vitals:    02/04/25 1648   BP: 133/70   BP Location: Right arm   Patient Position: Sitting   Cuff Size: Large Adult   Pulse: 78   Temp: 98.3 °F (36.8 °C)   TempSrc: Oral   SpO2: 99%   Weight: 117 kg (257 lb)   Height: 170.2 cm (67.01\")     Class 3 Severe Obesity (BMI >=40). Obesity-related health conditions include the following: obstructive sleep apnea, hypertension, dyslipidemias, GERD, and osteoarthritis. Obesity is unchanged. BMI is is above average; BMI management plan is completed. We discussed portion control and increasing exercise.      Physical Exam          Result Review        No Known Allergies   Past Medical History:   Diagnosis Date    Adjustment disorder with depressed mood     Anogenital (venereal) warts     Candidiasis of skin and nail     Chest pain, unspecified     Contact with and (suspected) exposure to covid-19     Contact with and (suspected) exposure to other viral communicable diseases     Dysthymic disorder     Furuncle of other sites     " Generalized anxiety disorder     Hypothyroidism, unspecified     Major depressive disorder, recurrent severe without psychotic features     Mixed hyperlipidemia     Neoplasm of uncertain behavior of skin     Nicotine dependence, unspecified, uncomplicated     Pain in left knee     Pain in right knee     Polycystic ovarian syndrome     Psychophysiologic insomnia     Pure hyperglyceridemia     Strain of unspecified muscle, fascia and tendon at shoulder and upper arm level, left arm, initial encounter     Vitamin D deficiency, unspecified      Current Outpatient Medications   Medication Sig Dispense Refill    albuterol sulfate  (90 Base) MCG/ACT inhaler Inhale 2 puffs Every 4 (Four) Hours As Needed for Wheezing. 8.5 g 1    atorvastatin (LIPITOR) 40 MG tablet TAKE 1 TABLET BY MOUTH EVERY NIGHT. 30 tablet 11    buPROPion XL (WELLBUTRIN XL) 300 MG 24 hr tablet Take 1 tablet by mouth Every Morning. 30 tablet 5    busPIRone (BUSPAR) 15 MG tablet Take 1 tablet by mouth 2 (Two) Times a Day. 60 tablet 5    cholecalciferol (VITAMIN D3) 25 MCG (1000 UT) tablet Take 1 tablet by mouth Daily.      hydrOXYzine (ATARAX) 25 MG tablet Take 1 tablet by mouth 3 (Three) Times a Day As Needed for Itching. 30 tablet 2    lamoTRIgine (LaMICtal) 100 MG tablet Take 1 tablet by mouth 2 (Two) Times a Day. 60 tablet 5    levothyroxine (SYNTHROID, LEVOTHROID) 125 MCG tablet Take 1 tablet by mouth Daily. 30 tablet 5    lisinopril (PRINIVIL,ZESTRIL) 10 MG tablet Take 1 tablet by mouth Daily. 30 tablet 5    metFORMIN ER (GLUCOPHAGE-XR) 500 MG 24 hr tablet Take 1 tablet by mouth 2 (Two) Times a Day. 60 tablet 5    norethindrone (MICRONOR) 0.35 MG tablet Take 1 tablet by mouth Daily. 84 tablet 4     No current facility-administered medications for this visit.     Past Surgical History:   Procedure Laterality Date    HYSTERECTOMY      Hysterectomy: stage III dysplasia;     TONSILLECTOMY AND ADENOIDECTOMY  2019      Health Maintenance Due   Topic  Date Due    COLORECTAL CANCER SCREENING  Never done    ANNUAL PHYSICAL  12/06/2024      Immunization History   Administered Date(s) Administered    COVID-19 (PFIZER) Purple Cap Monovalent 10/08/2021    COVID-19 (UNSPECIFIED) 11/15/2024    Covid-19 (Pfizer) Gray Cap Monovalent 12/07/2021    Flu Vaccine Split Quad 10/30/2017, 10/25/2021    Flublok 18+yrs 10/10/2023    Fluzone  >6mos 10/01/2024    Fluzone (or Fluarix & Flulaval for VFC) >6mos 11/15/2022    Influenza, Unspecified 10/30/2017    PPD Test 01/30/2007    Tdap 09/04/2024         Part of this note may be an electronic transcription/translation of spoken language to printed   text using the Dragon Dictation System.      ADIS Rodriguez

## 2025-02-24 ENCOUNTER — LAB (OUTPATIENT)
Dept: LAB | Facility: HOSPITAL | Age: 45
End: 2025-02-24
Payer: COMMERCIAL

## 2025-02-24 DIAGNOSIS — E03.9 HYPOTHYROIDISM, UNSPECIFIED TYPE: ICD-10-CM

## 2025-02-24 DIAGNOSIS — Z13.6 SCREENING FOR CARDIOVASCULAR CONDITION: ICD-10-CM

## 2025-02-24 LAB
ALBUMIN SERPL-MCNC: 3.9 G/DL (ref 3.5–5.2)
ALBUMIN/GLOB SERPL: 1.3 G/DL
ALP SERPL-CCNC: 84 U/L (ref 39–117)
ALT SERPL W P-5'-P-CCNC: 22 U/L (ref 1–33)
ANION GAP SERPL CALCULATED.3IONS-SCNC: 11.6 MMOL/L (ref 5–15)
AST SERPL-CCNC: 16 U/L (ref 1–32)
BILIRUB SERPL-MCNC: 0.2 MG/DL (ref 0–1.2)
BUN SERPL-MCNC: 13 MG/DL (ref 6–20)
BUN/CREAT SERPL: 16 (ref 7–25)
CALCIUM SPEC-SCNC: 9.2 MG/DL (ref 8.6–10.5)
CHLORIDE SERPL-SCNC: 102 MMOL/L (ref 98–107)
CHOLEST SERPL-MCNC: 171 MG/DL (ref 0–200)
CO2 SERPL-SCNC: 22.4 MMOL/L (ref 22–29)
CREAT SERPL-MCNC: 0.81 MG/DL (ref 0.57–1)
EGFRCR SERPLBLD CKD-EPI 2021: 91.4 ML/MIN/1.73
GLOBULIN UR ELPH-MCNC: 2.9 GM/DL
GLUCOSE SERPL-MCNC: 133 MG/DL (ref 65–99)
HDLC SERPL-MCNC: 45 MG/DL (ref 40–60)
LDLC SERPL CALC-MCNC: 105 MG/DL (ref 0–100)
LDLC/HDLC SERPL: 2.28 {RATIO}
POTASSIUM SERPL-SCNC: 4.4 MMOL/L (ref 3.5–5.2)
PROT SERPL-MCNC: 6.8 G/DL (ref 6–8.5)
SODIUM SERPL-SCNC: 136 MMOL/L (ref 136–145)
T4 FREE SERPL-MCNC: 0.96 NG/DL (ref 0.92–1.68)
TRIGL SERPL-MCNC: 116 MG/DL (ref 0–150)
TSH SERPL DL<=0.05 MIU/L-ACNC: 5.17 UIU/ML (ref 0.27–4.2)
VLDLC SERPL-MCNC: 21 MG/DL (ref 5–40)

## 2025-02-24 PROCEDURE — 36415 COLL VENOUS BLD VENIPUNCTURE: CPT

## 2025-02-24 PROCEDURE — 80053 COMPREHEN METABOLIC PANEL: CPT

## 2025-02-24 PROCEDURE — 84439 ASSAY OF FREE THYROXINE: CPT

## 2025-02-24 PROCEDURE — 80061 LIPID PANEL: CPT

## 2025-02-24 PROCEDURE — 84443 ASSAY THYROID STIM HORMONE: CPT

## 2025-03-03 DIAGNOSIS — R73.09 ELEVATED GLUCOSE: ICD-10-CM

## 2025-03-03 DIAGNOSIS — E03.9 HYPOTHYROIDISM, UNSPECIFIED TYPE: Primary | ICD-10-CM

## 2025-03-04 DIAGNOSIS — E03.9 HYPOTHYROIDISM, UNSPECIFIED TYPE: Chronic | ICD-10-CM

## 2025-03-04 RX ORDER — LEVOTHYROXINE SODIUM 137 UG/1
125 TABLET ORAL DAILY
Qty: 30 TABLET | Refills: 2 | Status: SHIPPED | OUTPATIENT
Start: 2025-03-04

## 2025-04-17 ENCOUNTER — LAB (OUTPATIENT)
Dept: LAB | Facility: HOSPITAL | Age: 45
End: 2025-04-17
Payer: COMMERCIAL

## 2025-04-17 DIAGNOSIS — R73.09 ELEVATED GLUCOSE: ICD-10-CM

## 2025-04-17 DIAGNOSIS — E03.9 HYPOTHYROIDISM, UNSPECIFIED TYPE: Chronic | ICD-10-CM

## 2025-04-17 LAB
HBA1C MFR BLD: 5.3 % (ref 4.8–5.6)
TSH SERPL DL<=0.05 MIU/L-ACNC: 0.57 UIU/ML (ref 0.27–4.2)

## 2025-04-17 PROCEDURE — 83036 HEMOGLOBIN GLYCOSYLATED A1C: CPT

## 2025-04-17 PROCEDURE — 84443 ASSAY THYROID STIM HORMONE: CPT

## 2025-04-17 PROCEDURE — 36415 COLL VENOUS BLD VENIPUNCTURE: CPT

## 2025-05-12 DIAGNOSIS — E03.9 HYPOTHYROIDISM, UNSPECIFIED TYPE: Chronic | ICD-10-CM

## 2025-05-12 RX ORDER — LEVOTHYROXINE SODIUM 137 UG/1
137 TABLET ORAL DAILY
Qty: 30 TABLET | Refills: 2 | Status: SHIPPED | OUTPATIENT
Start: 2025-05-12